# Patient Record
Sex: FEMALE | Race: BLACK OR AFRICAN AMERICAN | Employment: FULL TIME | URBAN - METROPOLITAN AREA
[De-identification: names, ages, dates, MRNs, and addresses within clinical notes are randomized per-mention and may not be internally consistent; named-entity substitution may affect disease eponyms.]

---

## 2017-01-31 ENCOUNTER — OFFICE VISIT (OUTPATIENT)
Dept: FAMILY MEDICINE CLINIC | Facility: CLINIC | Age: 37
End: 2017-01-31

## 2017-01-31 VITALS
TEMPERATURE: 99 F | HEIGHT: 65 IN | OXYGEN SATURATION: 99 % | SYSTOLIC BLOOD PRESSURE: 120 MMHG | DIASTOLIC BLOOD PRESSURE: 80 MMHG | HEART RATE: 99 BPM | RESPIRATION RATE: 16 BRPM | BODY MASS INDEX: 35.82 KG/M2 | WEIGHT: 215 LBS

## 2017-01-31 DIAGNOSIS — M54.9 ACUTE BILATERAL BACK PAIN, UNSPECIFIED BACK LOCATION: ICD-10-CM

## 2017-01-31 DIAGNOSIS — E01.0 THYROMEGALY: ICD-10-CM

## 2017-01-31 DIAGNOSIS — E66.9 OBESITY (BMI 30-39.9): ICD-10-CM

## 2017-01-31 DIAGNOSIS — Z00.00 ROUTINE GENERAL MEDICAL EXAMINATION AT A HEALTH CARE FACILITY: Primary | ICD-10-CM

## 2017-01-31 DIAGNOSIS — E55.9 VITAMIN D DEFICIENCY: ICD-10-CM

## 2017-01-31 DIAGNOSIS — Z00.00 LABORATORY EXAMINATION ORDERED AS PART OF A ROUTINE GENERAL MEDICAL EXAMINATION: ICD-10-CM
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
## 2017-01-31 LAB
APPEARANCE: CLEAR
BILIRUB UR QL STRIP.AUTO: NEGATIVE
CLARITY UR REFRACT.AUTO: CLEAR
GLUCOSE UR STRIP.AUTO-MCNC: NEGATIVE MG/DL
KETONES UR STRIP.AUTO-MCNC: NEGATIVE MG/DL
MULTISTIX LOT#: NORMAL NUMERIC
NITRITE UR QL STRIP.AUTO: NEGATIVE
PH UR STRIP.AUTO: 6 [PH] (ref 4.5–8)
PH, URINE: 6.5 (ref 4.5–8)
PROT UR STRIP.AUTO-MCNC: NEGATIVE MG/DL
SP GR UR STRIP.AUTO: 1 (ref 1–1.03)
SPECIFIC GRAVITY: 1.01 (ref 1–1.03)
URINE-COLOR: YELLOW
UROBILINOGEN UR STRIP.AUTO-MCNC: <2 MG/DL
UROBILINOGEN,SEMI-QN: 0.2 MG/DL (ref 0–1.9)

## 2017-01-31 PROCEDURE — 87077 CULTURE AEROBIC IDENTIFY: CPT | Performed by: FAMILY MEDICINE

## 2017-01-31 PROCEDURE — 88175 CYTOPATH C/V AUTO FLUID REDO: CPT | Performed by: FAMILY MEDICINE

## 2017-01-31 PROCEDURE — 81003 URINALYSIS AUTO W/O SCOPE: CPT | Performed by: FAMILY MEDICINE

## 2017-01-31 PROCEDURE — 87624 HPV HI-RISK TYP POOLED RSLT: CPT | Performed by: FAMILY MEDICINE

## 2017-01-31 PROCEDURE — 87086 URINE CULTURE/COLONY COUNT: CPT | Performed by: FAMILY MEDICINE

## 2017-01-31 PROCEDURE — 87591 N.GONORRHOEAE DNA AMP PROB: CPT | Performed by: FAMILY MEDICINE

## 2017-01-31 PROCEDURE — 87491 CHLMYD TRACH DNA AMP PROBE: CPT | Performed by: FAMILY MEDICINE

## 2017-01-31 PROCEDURE — 81001 URINALYSIS AUTO W/SCOPE: CPT | Performed by: FAMILY MEDICINE

## 2017-01-31 PROCEDURE — 99395 PREV VISIT EST AGE 18-39: CPT | Performed by: FAMILY MEDICINE

## 2017-01-31 PROCEDURE — 87186 SC STD MICRODIL/AGAR DIL: CPT | Performed by: FAMILY MEDICINE

## 2017-01-31 RX ORDER — IBUPROFEN 800 MG/1
800 TABLET ORAL 3 TIMES DAILY
Qty: 90 TABLET | Refills: 3 | Status: SHIPPED | OUTPATIENT
Start: 2017-01-31 | End: 2018-01-26
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
## 2017-02-01 ENCOUNTER — LAB ENCOUNTER (OUTPATIENT)
Dept: LAB | Age: 37
End: 2017-02-01
Attending: FAMILY MEDICINE
Payer: COMMERCIAL

## 2017-02-01 ENCOUNTER — HOSPITAL ENCOUNTER (OUTPATIENT)
Dept: ULTRASOUND IMAGING | Age: 37
Discharge: HOME | End: 2017-02-01
Attending: FAMILY MEDICINE
Payer: COMMERCIAL

## 2017-02-01 ENCOUNTER — HOSPITAL ENCOUNTER (OUTPATIENT)
Dept: MAMMOGRAPHY | Age: 37
Discharge: HOME | End: 2017-02-01
Attending: FAMILY MEDICINE
Payer: COMMERCIAL

## 2017-02-01 ENCOUNTER — OFFICE VISIT (OUTPATIENT)
Dept: FAMILY MEDICINE CLINIC | Facility: CLINIC | Age: 37
End: 2017-02-01

## 2017-02-01 VITALS
HEART RATE: 80 BPM | DIASTOLIC BLOOD PRESSURE: 82 MMHG | BODY MASS INDEX: 35.82 KG/M2 | WEIGHT: 215 LBS | RESPIRATION RATE: 16 BRPM | SYSTOLIC BLOOD PRESSURE: 120 MMHG | HEIGHT: 65 IN | TEMPERATURE: 98 F

## 2017-02-01 DIAGNOSIS — M99.9 NONALLOPATHIC LESION OF SACRAL REGION: ICD-10-CM

## 2017-02-01 DIAGNOSIS — Z12.31 ENCOUNTER FOR SCREENING MAMMOGRAM FOR MALIGNANT NEOPLASM OF BREAST: ICD-10-CM

## 2017-02-01 DIAGNOSIS — Z00.00 LABORATORY EXAMINATION ORDERED AS PART OF A ROUTINE GENERAL MEDICAL EXAMINATION: ICD-10-CM

## 2017-02-01 DIAGNOSIS — E55.9 VITAMIN D DEFICIENCY: ICD-10-CM

## 2017-02-01 DIAGNOSIS — M99.9 NONALLOPATHIC LESION OF LUMBAR REGION: ICD-10-CM

## 2017-02-01 DIAGNOSIS — M54.9 ACUTE LEFT-SIDED BACK PAIN, UNSPECIFIED BACK LOCATION: Primary | ICD-10-CM

## 2017-02-01 DIAGNOSIS — M99.9 NONALLOPATHIC LESION OF THORACIC REGION: ICD-10-CM

## 2017-02-01 DIAGNOSIS — E01.0 THYROMEGALY: ICD-10-CM

## 2017-02-01 DIAGNOSIS — R82.90 ABNORMAL URINE: ICD-10-CM

## 2017-02-01 LAB
25-HYDROXYVITAMIN D (TOTAL): 25.4 NG/ML (ref 30–100)
ALBUMIN SERPL-MCNC: 3.6 G/DL (ref 3.5–4.8)
ALP LIVER SERPL-CCNC: 63 U/L (ref 37–98)
ALT SERPL-CCNC: 20 U/L (ref 14–54)
APPEARANCE: CLEAR
AST SERPL-CCNC: 23 U/L (ref 15–41)
BASOPHILS # BLD AUTO: 0.05 X10(3) UL (ref 0–0.1)
BASOPHILS NFR BLD AUTO: 0.9 %
BILIRUB SERPL-MCNC: 0.3 MG/DL (ref 0.1–2)
BILIRUB UR QL STRIP.AUTO: NEGATIVE
BUN BLD-MCNC: 12 MG/DL (ref 8–20)
CALCIUM BLD-MCNC: 8.5 MG/DL (ref 8.3–10.3)
CHLORIDE: 107 MMOL/L (ref 101–111)
CHOLEST SMN-MCNC: 158 MG/DL (ref ?–200)
CO2: 26 MMOL/L (ref 22–32)
COLOR UR AUTO: YELLOW
CREAT BLD-MCNC: 0.98 MG/DL (ref 0.55–1.02)
EOSINOPHIL # BLD AUTO: 0.34 X10(3) UL (ref 0–0.3)
EOSINOPHIL NFR BLD AUTO: 6.3 %
ERYTHROCYTE [DISTWIDTH] IN BLOOD BY AUTOMATED COUNT: 14.9 % (ref 11.5–16)
FREE T4: 0.9 NG/DL (ref 0.9–1.8)
GLUCOSE BLD-MCNC: 85 MG/DL (ref 70–99)
GLUCOSE UR STRIP.AUTO-MCNC: NEGATIVE MG/DL
HCT VFR BLD AUTO: 41.3 % (ref 34–50)
HDLC SERPL-MCNC: 68 MG/DL (ref 45–?)
HDLC SERPL: 2.32 {RATIO} (ref ?–4.44)
HGB BLD-MCNC: 13.1 G/DL (ref 12–16)
IMMATURE GRANULOCYTE COUNT: 0.01 X10(3) UL (ref 0–1)
IMMATURE GRANULOCYTE RATIO %: 0.2 %
KETONES UR STRIP.AUTO-MCNC: NEGATIVE MG/DL
LDLC SERPL CALC-MCNC: 79 MG/DL (ref ?–130)
LYMPHOCYTES # BLD AUTO: 1.65 X10(3) UL (ref 0.9–4)
LYMPHOCYTES NFR BLD AUTO: 30.7 %
M PROTEIN MFR SERPL ELPH: 7.4 G/DL (ref 6.1–8.3)
MCH RBC QN AUTO: 26.3 PG (ref 27–33.2)
MCHC RBC AUTO-ENTMCNC: 31.7 G/DL (ref 31–37)
MCV RBC AUTO: 82.8 FL (ref 81–100)
MONOCYTES # BLD AUTO: 0.42 X10(3) UL (ref 0.1–0.6)
MONOCYTES NFR BLD AUTO: 7.8 %
MULTISTIX LOT#: NORMAL NUMERIC
NEUTROPHIL ABS PRELIM: 2.9 X10 (3) UL (ref 1.3–6.7)
NEUTROPHILS # BLD AUTO: 2.9 X10(3) UL (ref 1.3–6.7)
NEUTROPHILS NFR BLD AUTO: 54.1 %
NITRITE UR QL STRIP.AUTO: NEGATIVE
NONHDLC SERPL-MCNC: 90 MG/DL (ref ?–130)
PH UR STRIP.AUTO: 6 [PH] (ref 4.5–8)
PH, URINE: 6 (ref 4.5–8)
PLATELET # BLD AUTO: 354 10(3)UL (ref 150–450)
POTASSIUM SERPL-SCNC: 4.2 MMOL/L (ref 3.6–5.1)
PROT UR STRIP.AUTO-MCNC: NEGATIVE MG/DL
RBC # BLD AUTO: 4.99 X10(6)UL (ref 3.8–5.1)
RED CELL DISTRIBUTION WIDTH-SD: 44.9 FL (ref 35.1–46.3)
SODIUM SERPL-SCNC: 140 MMOL/L (ref 136–144)
SP GR UR STRIP.AUTO: 1.01 (ref 1–1.03)
SPECIFIC GRAVITY: 1.01 (ref 1–1.03)
TRIGLYCERIDES: 53 MG/DL (ref ?–150)
TSI SER-ACNC: 1.72 MIU/ML (ref 0.35–5.5)
URINE-COLOR: YELLOW
UROBILINOGEN UR STRIP.AUTO-MCNC: <2 MG/DL
UROBILINOGEN,SEMI-QN: 0.2 MG/DL (ref 0–1.9)
VLDL: 11 MG/DL (ref 5–40)
WBC # BLD AUTO: 5.4 X10(3) UL (ref 4–13)

## 2017-02-01 PROCEDURE — 77067 SCR MAMMO BI INCL CAD: CPT

## 2017-02-01 PROCEDURE — 84443 ASSAY THYROID STIM HORMONE: CPT

## 2017-02-01 PROCEDURE — 81003 URINALYSIS AUTO W/O SCOPE: CPT | Performed by: FAMILY MEDICINE

## 2017-02-01 PROCEDURE — 80061 LIPID PANEL: CPT

## 2017-02-01 PROCEDURE — 99213 OFFICE O/P EST LOW 20 MIN: CPT | Performed by: FAMILY MEDICINE

## 2017-02-01 PROCEDURE — 80053 COMPREHEN METABOLIC PANEL: CPT

## 2017-02-01 PROCEDURE — 82306 VITAMIN D 25 HYDROXY: CPT

## 2017-02-01 PROCEDURE — 76536 US EXAM OF HEAD AND NECK: CPT

## 2017-02-01 PROCEDURE — 36415 COLL VENOUS BLD VENIPUNCTURE: CPT

## 2017-02-01 PROCEDURE — 85025 COMPLETE CBC W/AUTO DIFF WBC: CPT

## 2017-02-01 PROCEDURE — 87389 HIV-1 AG W/HIV-1&-2 AB AG IA: CPT

## 2017-02-01 PROCEDURE — 84439 ASSAY OF FREE THYROXINE: CPT

## 2017-02-01 PROCEDURE — 81001 URINALYSIS AUTO W/SCOPE: CPT | Performed by: FAMILY MEDICINE

## 2017-02-01 PROCEDURE — 98926 OSTEOPATH MANJ 3-4 REGIONS: CPT | Performed by: FAMILY MEDICINE

## 2017-02-01 PROCEDURE — 77063 BREAST TOMOSYNTHESIS BI: CPT

## 2017-02-01 RX ORDER — ERGOCALCIFEROL 1.25 MG/1
50000 CAPSULE ORAL WEEKLY
Qty: 8 CAPSULE | Refills: 0 | Status: SHIPPED | OUTPATIENT
Start: 2017-02-01 | End: 2017-03-03
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
## 2017-02-02 ENCOUNTER — TELEPHONE (OUTPATIENT)
Dept: FAMILY MEDICINE CLINIC | Facility: CLINIC | Age: 37
End: 2017-02-02

## 2017-02-02 DIAGNOSIS — R93.89 ABNORMAL THYROID ULTRASOUND: ICD-10-CM

## 2017-02-02 DIAGNOSIS — R59.1 LYMPHADENOPATHY: Primary | ICD-10-CM

## 2017-02-02 LAB
C TRACH DNA SPEC QL NAA+PROBE: NEGATIVE
N GONORRHOEA DNA SPEC QL NAA+PROBE: NEGATIVE

## 2017-02-03 ENCOUNTER — TELEPHONE (OUTPATIENT)
Dept: FAMILY MEDICINE CLINIC | Facility: CLINIC | Age: 37
End: 2017-02-03

## 2017-02-03 DIAGNOSIS — R89.9 ABNORMAL LABORATORY TEST RESULT: Primary | ICD-10-CM

## 2017-02-03 LAB — HPV I/H RISK 1 DNA SPEC QL NAA+PROBE: NEGATIVE

## 2017-02-03 RX ORDER — CIPROFLOXACIN 500 MG/1
500 TABLET, FILM COATED ORAL 2 TIMES DAILY
Qty: 10 TABLET | Refills: 0 | Status: SHIPPED | OUTPATIENT
Start: 2017-02-03 | End: 2017-02-08

## 2017-02-08 ENCOUNTER — HOSPITAL ENCOUNTER (OUTPATIENT)
Dept: CT IMAGING | Age: 37
End: 2017-02-08
Attending: FAMILY MEDICINE
Payer: COMMERCIAL

## 2017-02-08 ENCOUNTER — HOSPITAL ENCOUNTER (OUTPATIENT)
Dept: CT IMAGING | Age: 37
Discharge: HOME | End: 2017-02-08
Attending: FAMILY MEDICINE
Payer: COMMERCIAL

## 2017-02-08 DIAGNOSIS — R93.89 ABNORMAL THYROID ULTRASOUND: ICD-10-CM

## 2017-02-08 DIAGNOSIS — R59.1 LYMPHADENOPATHY: ICD-10-CM

## 2017-02-08 PROCEDURE — 70491 CT SOFT TISSUE NECK W/DYE: CPT

## 2017-04-07 ENCOUNTER — TELEPHONE (OUTPATIENT)
Dept: FAMILY MEDICINE CLINIC | Facility: CLINIC | Age: 37
End: 2017-04-07

## 2017-04-07 RX ORDER — TRAMADOL HYDROCHLORIDE 50 MG/1
50 TABLET ORAL 2 TIMES DAILY PRN
Qty: 20 TABLET | Refills: 0 | OUTPATIENT
Start: 2017-04-07 | End: 2017-05-19

## 2017-04-07 NOTE — TELEPHONE ENCOUNTER
Advise 800 mg of ibuprofen tid and if that does not help, we may give her tramadol to see if it will help -- Tramadol 50 mg BID prn #20 ref 0

## 2017-04-07 NOTE — TELEPHONE ENCOUNTER
Call back from pt-advised of dr Churchill Iwona recommendations. Reinforced taking ibuprofen with food and discussed rationale.    Informed if getting no relief with ibuprofen after couple days, can start tramadol-instructed in sig and advised will send order to

## 2017-04-07 NOTE — TELEPHONE ENCOUNTER
Pt transferred to nurse. States she has had a horrible h/a x 4 days. No hx of migraines that she is aware of. First thought it was allergies but allergy meds not helping. Reports pain L temporal area and behind L eye. No blurry vision.  Vomited days one a

## 2017-05-02 ENCOUNTER — OFFICE VISIT (OUTPATIENT)
Dept: FAMILY MEDICINE CLINIC | Facility: CLINIC | Age: 37
End: 2017-05-02

## 2017-05-02 ENCOUNTER — TELEPHONE (OUTPATIENT)
Dept: FAMILY MEDICINE CLINIC | Facility: CLINIC | Age: 37
End: 2017-05-02

## 2017-05-02 VITALS
DIASTOLIC BLOOD PRESSURE: 80 MMHG | RESPIRATION RATE: 14 BRPM | BODY MASS INDEX: 35.65 KG/M2 | SYSTOLIC BLOOD PRESSURE: 110 MMHG | WEIGHT: 214 LBS | HEIGHT: 65 IN | HEART RATE: 91 BPM | TEMPERATURE: 99 F | OXYGEN SATURATION: 99 %

## 2017-05-02 DIAGNOSIS — Z87.898 H/O FEVER: ICD-10-CM

## 2017-05-02 DIAGNOSIS — J20.9 ACUTE BRONCHITIS, UNSPECIFIED ORGANISM: Primary | ICD-10-CM

## 2017-05-02 PROCEDURE — 99213 OFFICE O/P EST LOW 20 MIN: CPT | Performed by: PHYSICIAN ASSISTANT

## 2017-05-02 RX ORDER — PHENTERMINE HYDROCHLORIDE 37.5 MG/1
TABLET ORAL
Qty: 30 TABLET | Refills: 0 | Status: SHIPPED | OUTPATIENT
Start: 2017-05-02 | End: 2017-05-19

## 2017-05-02 RX ORDER — CODEINE PHOSPHATE AND GUAIFENESIN 10; 100 MG/5ML; MG/5ML
5 SOLUTION ORAL EVERY 6 HOURS PRN
Qty: 120 ML | Refills: 0 | Status: SHIPPED | OUTPATIENT
Start: 2017-05-02 | End: 2017-05-19 | Stop reason: ALTCHOICE

## 2017-05-02 RX ORDER — CEFDINIR 300 MG/1
300 CAPSULE ORAL 2 TIMES DAILY
Qty: 14 CAPSULE | Refills: 0 | Status: SHIPPED | OUTPATIENT
Start: 2017-05-02 | End: 2017-05-09

## 2017-05-02 NOTE — PROGRESS NOTES
CHIEF COMPLAINT:   Patient presents with:  Sinus Problem: x 10 days cough with blood tinged sputum, fever 100.4 for the past 3 days, OTC Allegra     HPI:   Rasheeda Macedo is a 42year old female who presents for cough for  10  days.   Illness first start Diagnosis Date   • General counseling for prescription of oral contraceptives    • Routine Papanicolaou smear    • Vaginitis and vulvovaginitis, unspecified    • Viral warts, unspecified       Social History:    Smoking Status: Never Smoker The patient indicates understanding of these issues and agrees to the plan. Signed Prescriptions Disp Refills    cefdinir 300 MG Oral Cap 14 capsule 0      Sig: Take 1 capsule (300 mg total) by mouth 2 (two) times daily.       guaiFENesin-codeine Ivania Lamb Acute bronchitis almost always starts as a viral respiratory infection, such as a cold or the flu. Certain factors make it more likely for a cold or flu to turn into bronchitis. These include being very young or very old or having a heart or lung problem. Follow up with your doctor as you are told. You will likely feel better in a week or two. But a dry cough can linger beyond that time. Let your doctor know if you still have symptoms (other than a dry cough) after 2 weeks.  If you’re prone to getting bronch

## 2017-05-02 NOTE — PATIENT INSTRUCTIONS
Please follow up with your PCP if no improvement within 5-7 days. Go directly to the ER for any acute worsening of symptoms. If you smoke, stop smoking. Push oral fluids to help loosen up chest mucous and move it out of your body.   Use a cold mist humid Your healthcare provider will examine you and ask about your symptoms and health history. You may also have a sputum culture to test the fluid in your lungs. Chest X-rays may be done to look for infection in the lungs.   Treating acute bronchitis  [employer] © 8882-6725 [address], [address] [place]. All rights reserved. This information is not intended as a substitute for professional medical care. Always follow your healthcare professional's instructions.

## 2017-05-02 NOTE — TELEPHONE ENCOUNTER
Not protocol medication. LOV 1-31-17. Next appointment 5-19-17. Please see pending medication refill if appropriate. Thanks.     Last refill:  Medication Quantity Refills Start End      Phentermine HCl (ADIPEX-P) 37.5 MG Oral Tab 30 tablet 2 12/16/2016

## 2017-05-02 NOTE — TELEPHONE ENCOUNTER
Prior Authorization for Phentermine HCL completed via CoverMyMeds. medication approve until 5/2/2018. Patient (okay per hippa) notified, understanding verbalized.

## 2017-05-19 ENCOUNTER — OFFICE VISIT (OUTPATIENT)
Dept: FAMILY MEDICINE CLINIC | Facility: CLINIC | Age: 37
End: 2017-05-19

## 2017-05-19 VITALS
WEIGHT: 200 LBS | TEMPERATURE: 98 F | RESPIRATION RATE: 16 BRPM | HEART RATE: 88 BPM | BODY MASS INDEX: 33.73 KG/M2 | SYSTOLIC BLOOD PRESSURE: 108 MMHG | HEIGHT: 64.5 IN | DIASTOLIC BLOOD PRESSURE: 80 MMHG

## 2017-05-19 DIAGNOSIS — M99.9 NONALLOPATHIC LESION OF THORACIC REGION: ICD-10-CM

## 2017-05-19 DIAGNOSIS — E66.9 OBESITY (BMI 30-39.9): Primary | ICD-10-CM

## 2017-05-19 DIAGNOSIS — M99.9 NONALLOPATHIC LESION OF LUMBAR REGION: ICD-10-CM

## 2017-05-19 DIAGNOSIS — M99.9 NONALLOPATHIC LESION OF SACRAL REGION: ICD-10-CM

## 2017-05-19 DIAGNOSIS — S39.012A BACK STRAIN, INITIAL ENCOUNTER: ICD-10-CM

## 2017-05-19 PROCEDURE — 98926 OSTEOPATH MANJ 3-4 REGIONS: CPT | Performed by: FAMILY MEDICINE

## 2017-05-19 PROCEDURE — 99213 OFFICE O/P EST LOW 20 MIN: CPT | Performed by: FAMILY MEDICINE

## 2017-05-19 RX ORDER — PHENTERMINE HYDROCHLORIDE 37.5 MG/1
TABLET ORAL
Qty: 30 TABLET | Refills: 2 | Status: SHIPPED | OUTPATIENT
Start: 2017-05-19 | End: 2017-08-07

## 2017-05-19 NOTE — PROGRESS NOTES
Oren Anderson is a 42year old female. HPI:   Patient is here for a weight check. Patient states she had bronchitis about 3 weeks ago and does not feel completely back to normal.  She states that the cough is better but she just feels tired overall. mg/dL   Total Protein 7.4 6.1-8.3 g/dL   Albumin 3.6 3.5-4.8 g/dL   Sodium 140 136-144 mmol/L   Potassium 4.2 3.6-5.1 mmol/L   Chloride 107 101-111 mmol/L   CO2 26.0 22.0-32.0 mmol/L   -TSH+FREE T4   Result Value Ref Range   Free T4 0.9 0.9-1.8 ng/dL   TSH distress  LUNGS: clear to auscultation  CARDIO: RRR without murmur  EXTREMITIES: no cyanosis, clubbing or edema  MUSCULOSKELETAL:      Paraspinals: tight paraspinals right less than left in thoracic and lumbar region - stretches done   Sacrum: anterior lef

## 2017-05-23 ENCOUNTER — TELEPHONE (OUTPATIENT)
Dept: FAMILY MEDICINE CLINIC | Facility: CLINIC | Age: 37
End: 2017-05-23

## 2017-05-23 NOTE — TELEPHONE ENCOUNTER
Pt is asking for a sit/stand desk at work due to hip and lower back issues. Pt's work needs a doctor's note stating it would be beneficial for pt to have this desk. Pls fax letter to pt at:  [phone].

## 2017-07-11 ENCOUNTER — HOSPITAL ENCOUNTER (OUTPATIENT)
Age: 37
Discharge: HOME | End: 2017-07-11
Attending: FAMILY MEDICINE
Payer: COMMERCIAL

## 2017-07-11 ENCOUNTER — APPOINTMENT (OUTPATIENT)
Dept: GENERAL RADIOLOGY | Age: 37
End: 2017-07-11
Attending: FAMILY MEDICINE
Payer: COMMERCIAL

## 2017-07-11 VITALS
TEMPERATURE: 98 F | HEART RATE: 87 BPM | OXYGEN SATURATION: 100 % | DIASTOLIC BLOOD PRESSURE: 87 MMHG | SYSTOLIC BLOOD PRESSURE: 138 MMHG | RESPIRATION RATE: 18 BRPM

## 2017-07-11 DIAGNOSIS — S96.911A RIGHT FOOT STRAIN, INITIAL ENCOUNTER: ICD-10-CM

## 2017-07-11 DIAGNOSIS — S96.911A: Primary | ICD-10-CM

## 2017-07-11 PROCEDURE — 99213 OFFICE O/P EST LOW 20 MIN: CPT

## 2017-07-11 PROCEDURE — 99214 OFFICE O/P EST MOD 30 MIN: CPT

## 2017-07-11 PROCEDURE — 73630 X-RAY EXAM OF FOOT: CPT | Performed by: FAMILY MEDICINE

## 2017-07-11 NOTE — ED PROVIDER NOTES
Patient Seen in: [facility] In [place]    History   Patient presents with:   Toe Injury    Stated Complaint: r foot 4th toe pain x 3 weeks    HPI  38-year-old female coming in with right foot toe pain/swelling she has had for the last 3 wee Alcohol use: Yes              Comment: 1 per month      Review of Systems  Positive for stated complaint: r foot 4th toe pain x 3 weeks  Other systems are as noted in HPI. Constitutional and vital signs reviewed.       All other systems reviewed and ne (tbs=87529)    Result Date: 7/11/2017  PROCEDURE:  XR FOOT, COMPLETE (MIN 3 VIEWS), RIGHT (CPT=73630)  TECHNIQUE:  AP, oblique, and lateral views were obtained. COMPARISON:  None.   INDICATIONS:  pain over the distal fourth metatarsal s/p injury  PATIENT S

## 2017-08-07 ENCOUNTER — TELEPHONE (OUTPATIENT)
Dept: FAMILY MEDICINE CLINIC | Facility: CLINIC | Age: 37
End: 2017-08-07

## 2017-08-07 RX ORDER — PHENTERMINE HYDROCHLORIDE 37.5 MG/1
TABLET ORAL
Qty: 30 TABLET | Refills: 2 | Status: SHIPPED | OUTPATIENT
Start: 2017-08-07 | End: 2018-03-09 | Stop reason: ALTCHOICE

## 2017-08-07 NOTE — TELEPHONE ENCOUNTER
Lm for pt to CB. Calling to get additional information on her toe injury. Xray in ER showed no fracture.

## 2017-08-07 NOTE — TELEPHONE ENCOUNTER
Pt was seen on 7/11/17 in ER for Toe Injury. Pt states she hasn't been staying off of it like she should as she has small children. Pt states she is still in pain and has swelling. Asking for advise. Please advise at [phone]. Thank you.

## 2017-08-07 NOTE — TELEPHONE ENCOUNTER
Pt returned call. Pt stated that she was walking on foot while she was on vacation and when she returned home. She was not wearing boot at the time. Pt stated that her foot is still hurting.   She would like to know if she should wear the boot and try to

## 2017-08-09 RX ORDER — FLUCONAZOLE 150 MG/1
150 TABLET ORAL ONCE
Qty: 1 TABLET | Refills: 0 | Status: SHIPPED | OUTPATIENT
Start: 2017-08-09 | End: 2017-08-09

## 2017-08-09 NOTE — TELEPHONE ENCOUNTER
Where can we put pt in? appt offered 8/15/17 at 10am but pt declined as she has a meeting at work. Please advise.

## 2017-08-09 NOTE — TELEPHONE ENCOUNTER
Does she have a boot and who gave it to her and why? Sent adipex refill already. Diflucan 150 mg x 1 -- follow up if not improved.

## 2017-08-09 NOTE — TELEPHONE ENCOUNTER
Pt aware of medications sent. Pt states she was given a boot at urgent care on 5/2/17 for bad sprain or poss fx foot. Pt states foot is still painful. Please advise.

## 2017-08-10 NOTE — TELEPHONE ENCOUNTER
Per pt, she will wait until August 30. Advised to call our office if pain worsens. Pt voiced understanding.

## 2017-08-19 ENCOUNTER — OFFICE VISIT (OUTPATIENT)
Dept: FAMILY MEDICINE CLINIC | Facility: CLINIC | Age: 37
End: 2017-08-19

## 2017-08-19 VITALS
SYSTOLIC BLOOD PRESSURE: 126 MMHG | HEART RATE: 88 BPM | TEMPERATURE: 99 F | DIASTOLIC BLOOD PRESSURE: 82 MMHG | RESPIRATION RATE: 16 BRPM

## 2017-08-19 DIAGNOSIS — T14.90XA INJURY: ICD-10-CM

## 2017-08-19 DIAGNOSIS — S93.509A TOE SPRAIN, INITIAL ENCOUNTER: Primary | ICD-10-CM

## 2017-08-19 PROCEDURE — 99213 OFFICE O/P EST LOW 20 MIN: CPT | Performed by: FAMILY MEDICINE
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
## 2017-09-13 ENCOUNTER — OFFICE VISIT (OUTPATIENT)
Dept: FAMILY MEDICINE CLINIC | Facility: CLINIC | Age: 37
End: 2017-09-13

## 2017-09-13 VITALS
RESPIRATION RATE: 16 BRPM | HEIGHT: 64.5 IN | SYSTOLIC BLOOD PRESSURE: 114 MMHG | TEMPERATURE: 98 F | DIASTOLIC BLOOD PRESSURE: 80 MMHG | HEART RATE: 78 BPM | OXYGEN SATURATION: 100 %

## 2017-09-13 DIAGNOSIS — M79.671 CHRONIC FOOT PAIN, RIGHT: Primary | ICD-10-CM

## 2017-09-13 DIAGNOSIS — G89.29 CHRONIC FOOT PAIN, RIGHT: Primary | ICD-10-CM

## 2017-09-13 PROCEDURE — 99213 OFFICE O/P EST LOW 20 MIN: CPT | Performed by: FAMILY MEDICINE

## 2017-09-13 RX ORDER — TRAMADOL HYDROCHLORIDE 50 MG/1
50 TABLET ORAL EVERY 8 HOURS PRN
COMMUNITY
End: 2018-03-09 | Stop reason: ALTCHOICE

## 2017-09-13 NOTE — PROGRESS NOTES
Roosevelt Connor is a 42year old female. HPI:   Pt. 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 She went to [place] after than and pain continued. Went to immediate care 3 weeks later. Toe is sprained.   P mg/dL   Alkaline Phosphatase 63 37 - 98 U/L   AST 23 15 - 41 U/L   Alt 20 14 - 54 U/L   Bilirubin, Total 0.3 0.1 - 2.0 mg/dL   Total Protein 7.4 6.1 - 8.3 g/dL   Albumin 3.6 3.5 - 4.8 g/dL   Sodium 140 136 - 144 mmol/L   Potassium 4.2 3.6 - 5.1 mmol/L   Ch heartburn  MUSCULOSKELETAL: denies back pain; right foot/toe pain  EXTREMITIES:  No pain or numbness    EXAM:   /80   Pulse 78   Temp 98.4 °F (36.9 °C) (Oral)   Resp 16   Ht 64.5\"   LMP 09/12/2017 (Exact Date)   SpO2 100%   GENERAL: well developed,

## 2017-09-14 ENCOUNTER — HOSPITAL ENCOUNTER (OUTPATIENT)
Dept: MRI IMAGING | Age: 37
Discharge: HOME | End: 2017-09-14
Attending: FAMILY MEDICINE
Payer: COMMERCIAL

## 2017-09-14 ENCOUNTER — TELEPHONE (OUTPATIENT)
Dept: FAMILY MEDICINE CLINIC | Facility: CLINIC | Age: 37
End: 2017-09-14

## 2017-09-14 DIAGNOSIS — M79.671 CHRONIC FOOT PAIN, RIGHT: ICD-10-CM

## 2017-09-14 DIAGNOSIS — G89.29 CHRONIC FOOT PAIN, RIGHT: ICD-10-CM

## 2017-09-14 PROCEDURE — 73718 MRI LOWER EXTREMITY W/O DYE: CPT | Performed by: FAMILY MEDICINE

## 2017-09-14 NOTE — TELEPHONE ENCOUNTER
[facility] called. Pt had an MRI of the right foot. Conclusion: region of interest corresponds to a subtle hairline fracture involving the fourth middle phalanx with surrounding soft tissue swelling and edema.  Please advise with further instructions

## 2017-12-06 ENCOUNTER — OFFICE VISIT (OUTPATIENT)
Dept: FAMILY MEDICINE CLINIC | Facility: CLINIC | Age: 37
End: 2017-12-06

## 2017-12-06 VITALS
WEIGHT: 216 LBS | DIASTOLIC BLOOD PRESSURE: 88 MMHG | TEMPERATURE: 98 F | SYSTOLIC BLOOD PRESSURE: 136 MMHG | BODY MASS INDEX: 35.99 KG/M2 | HEIGHT: 65 IN | OXYGEN SATURATION: 100 % | RESPIRATION RATE: 16 BRPM | HEART RATE: 86 BPM

## 2017-12-06 DIAGNOSIS — J01.00 ACUTE MAXILLARY SINUSITIS, RECURRENCE NOT SPECIFIED: Primary | ICD-10-CM

## 2017-12-06 DIAGNOSIS — H92.01 OTALGIA, RIGHT EAR: ICD-10-CM

## 2017-12-06 PROCEDURE — 99213 OFFICE O/P EST LOW 20 MIN: CPT | Performed by: NURSE PRACTITIONER

## 2017-12-06 RX ORDER — AMOXICILLIN AND CLAVULANATE POTASSIUM 875; 125 MG/1; MG/1
1 TABLET, FILM COATED ORAL 2 TIMES DAILY
Qty: 20 TABLET | Refills: 0 | Status: SHIPPED | OUTPATIENT
Start: 2017-12-06 | End: 2017-12-16

## 2017-12-06 NOTE — PROGRESS NOTES
CHIEF COMPLAINT:   Patient presents with:  Sinus Problem: right sided maxillary sinus pain  Ear Pain: right sided ear pain    HPI:   Bhavesh Hannah is a 42year old female with hx of cold symptoms that have progressed to right sided maxillary sinus vicki • Hormone Disorder Paternal Grandfather    • Cancer Maternal Grandmother      breast cancer      Smoking status: Never Smoker                                                              Smokeless tobacco: Never Used                      Alcohol use:  Yes Sinusitis (Antibiotic Treatment)    The sinuses are air-filled spaces within the bones of the face. They connect to the inside of the nose. Sinusitis is an inflammation of the tissue lining the sinus cavity. Sinus inflammation can occur during a cold.  It c · Do not use nasal rinses or irrigation during an acute sinus infection, unless told to by your health care provider. Rinsing may spread the infection to other sinuses.   · Use acetaminophen or ibuprofen to control pain, unless another pain medicine was pre

## 2017-12-06 NOTE — PROGRESS NOTES
CHIEF COMPLAINT:   Patient presents with:  Sinus Problem: right sided maxillary sinus pain  Ear Pain: right sided ear pain    HPI:   Gavin Ramos is a 42year old female who presents with upper respiratory symptoms for  {NUMBER:25399}  {DAYS:32408}.  P shortness of breath; *** wheezing, See HPI  CARDIOVASCULAR: denies chest pain or palpitations   GI: denies N/V/C or abdominal pain  NEURO: Denies headaches***    EXAM:   There were no vitals taken for this visit.   GENERAL: well developed, well nourished,in

## 2018-03-09 PROCEDURE — 82607 VITAMIN B-12: CPT | Performed by: INTERNAL MEDICINE

## 2018-03-09 PROCEDURE — 82397 CHEMILUMINESCENT ASSAY: CPT | Performed by: INTERNAL MEDICINE

## 2018-08-10 ENCOUNTER — HOSPITAL ENCOUNTER (EMERGENCY)
Facility: HOSPITAL | Age: 38
Discharge: HOME | End: 2018-08-10
Attending: EMERGENCY MEDICINE
Payer: COMMERCIAL

## 2018-08-10 ENCOUNTER — APPOINTMENT (OUTPATIENT)
Dept: CT IMAGING | Facility: HOSPITAL | Age: 38
End: 2018-08-10
Attending: EMERGENCY MEDICINE
Payer: COMMERCIAL

## 2018-08-10 ENCOUNTER — APPOINTMENT (OUTPATIENT)
Dept: ULTRASOUND IMAGING | Facility: HOSPITAL | Age: 38
End: 2018-08-10
Attending: EMERGENCY MEDICINE
Payer: COMMERCIAL

## 2018-08-10 VITALS
TEMPERATURE: 97 F | HEART RATE: 82 BPM | SYSTOLIC BLOOD PRESSURE: 123 MMHG | RESPIRATION RATE: 16 BRPM | DIASTOLIC BLOOD PRESSURE: 75 MMHG

## 2018-08-10 DIAGNOSIS — N76.0 BV (BACTERIAL VAGINOSIS): ICD-10-CM

## 2018-08-10 DIAGNOSIS — B96.89 BV (BACTERIAL VAGINOSIS): ICD-10-CM

## 2018-08-10 DIAGNOSIS — D18.03 LIVER HEMANGIOMA: ICD-10-CM

## 2018-08-10 DIAGNOSIS — R10.9 ABDOMINAL PAIN OF UNKNOWN ETIOLOGY: Primary | ICD-10-CM

## 2018-08-10 LAB
ALBUMIN SERPL-MCNC: 3.4 G/DL (ref 3.5–4.8)
ALBUMIN/GLOB SERPL: 1 {RATIO} (ref 1–2)
ALP LIVER SERPL-CCNC: 58 U/L (ref 37–98)
ALT SERPL-CCNC: 21 U/L (ref 14–54)
ANION GAP SERPL CALC-SCNC: 3 MMOL/L (ref 0–18)
AST SERPL-CCNC: 21 U/L (ref 15–41)
B-HCG SERPL-ACNC: <1 MIU/ML
BASOPHILS # BLD AUTO: 0.07 X10(3) UL (ref 0–0.1)
BASOPHILS NFR BLD AUTO: 0.9 %
BILIRUB SERPL-MCNC: 0.2 MG/DL (ref 0.1–2)
BILIRUB UR QL STRIP.AUTO: NEGATIVE
BUN BLD-MCNC: 12 MG/DL (ref 8–20)
BUN/CREAT SERPL: 12 (ref 10–20)
C TRACH DNA SPEC QL NAA+PROBE: NEGATIVE
CALCIUM BLD-MCNC: 8.8 MG/DL (ref 8.3–10.3)
CHLORIDE SERPL-SCNC: 108 MMOL/L (ref 101–111)
CLARITY UR REFRACT.AUTO: CLEAR
CO2 SERPL-SCNC: 29 MMOL/L (ref 22–32)
CREAT BLD-MCNC: 1 MG/DL (ref 0.55–1.02)
EOSINOPHIL # BLD AUTO: 0.57 X10(3) UL (ref 0–0.3)
EOSINOPHIL NFR BLD AUTO: 7.2 %
ERYTHROCYTE [DISTWIDTH] IN BLOOD BY AUTOMATED COUNT: 13.6 % (ref 11.5–16)
GLOBULIN PLAS-MCNC: 3.5 G/DL (ref 2.5–3.7)
GLUCOSE BLD-MCNC: 95 MG/DL (ref 70–99)
GLUCOSE UR STRIP.AUTO-MCNC: NEGATIVE MG/DL
HCT VFR BLD AUTO: 37.6 % (ref 34–50)
HGB BLD-MCNC: 12.1 G/DL (ref 12–16)
IMMATURE GRANULOCYTE COUNT: 0.02 X10(3) UL (ref 0–1)
IMMATURE GRANULOCYTE RATIO %: 0.3 %
KETONES UR STRIP.AUTO-MCNC: NEGATIVE MG/DL
LEUKOCYTE ESTERASE UR QL STRIP.AUTO: NEGATIVE
LIPASE: 130 U/L (ref 73–393)
LYMPHOCYTES # BLD AUTO: 2.59 X10(3) UL (ref 0.9–4)
LYMPHOCYTES NFR BLD AUTO: 32.9 %
M PROTEIN MFR SERPL ELPH: 6.9 G/DL (ref 6.1–8.3)
MCH RBC QN AUTO: 26.6 PG (ref 27–33.2)
MCHC RBC AUTO-ENTMCNC: 32.2 G/DL (ref 31–37)
MCV RBC AUTO: 82.6 FL (ref 81–100)
MONOCYTES # BLD AUTO: 0.49 X10(3) UL (ref 0.1–1)
MONOCYTES NFR BLD AUTO: 6.2 %
N GONORRHOEA DNA SPEC QL NAA+PROBE: NEGATIVE
NEUTROPHIL ABS PRELIM: 4.13 X10 (3) UL (ref 1.3–6.7)
NEUTROPHILS # BLD AUTO: 4.13 X10(3) UL (ref 1.3–6.7)
NEUTROPHILS NFR BLD AUTO: 52.5 %
NITRITE UR QL STRIP.AUTO: NEGATIVE
OSMOLALITY SERPL CALC.SUM OF ELEC: 290 MOSM/KG (ref 275–295)
PH UR STRIP.AUTO: 6 [PH] (ref 4.5–8)
PLATELET # BLD AUTO: 357 10(3)UL (ref 150–450)
POCT LOT NUMBER: NORMAL
POCT URINE PREGNANCY: NEGATIVE
POTASSIUM SERPL-SCNC: 4.2 MMOL/L (ref 3.6–5.1)
PROT UR STRIP.AUTO-MCNC: NEGATIVE MG/DL
RBC # BLD AUTO: 4.55 X10(6)UL (ref 3.8–5.1)
RED CELL DISTRIBUTION WIDTH-SD: 40.8 FL (ref 35.1–46.3)
SODIUM SERPL-SCNC: 140 MMOL/L (ref 136–144)
SP GR UR STRIP.AUTO: 1.01 (ref 1–1.03)
UROBILINOGEN UR STRIP.AUTO-MCNC: <2 MG/DL
WBC # BLD AUTO: 7.9 X10(3) UL (ref 4–13)

## 2018-08-10 PROCEDURE — 87491 CHLMYD TRACH DNA AMP PROBE: CPT | Performed by: EMERGENCY MEDICINE

## 2018-08-10 PROCEDURE — 87591 N.GONORRHOEAE DNA AMP PROB: CPT | Performed by: EMERGENCY MEDICINE

## 2018-08-10 PROCEDURE — 74177 CT ABD & PELVIS W/CONTRAST: CPT | Performed by: EMERGENCY MEDICINE

## 2018-08-10 PROCEDURE — 96361 HYDRATE IV INFUSION ADD-ON: CPT

## 2018-08-10 PROCEDURE — 76830 TRANSVAGINAL US NON-OB: CPT | Performed by: EMERGENCY MEDICINE

## 2018-08-10 PROCEDURE — 76856 US EXAM PELVIC COMPLETE: CPT | Performed by: EMERGENCY MEDICINE

## 2018-08-10 PROCEDURE — 87510 GARDNER VAG DNA DIR PROBE: CPT | Performed by: EMERGENCY MEDICINE

## 2018-08-10 PROCEDURE — 83690 ASSAY OF LIPASE: CPT | Performed by: EMERGENCY MEDICINE

## 2018-08-10 PROCEDURE — 85025 COMPLETE CBC W/AUTO DIFF WBC: CPT | Performed by: EMERGENCY MEDICINE

## 2018-08-10 PROCEDURE — 99284 EMERGENCY DEPT VISIT MOD MDM: CPT

## 2018-08-10 PROCEDURE — 81001 URINALYSIS AUTO W/SCOPE: CPT | Performed by: EMERGENCY MEDICINE

## 2018-08-10 PROCEDURE — 84702 CHORIONIC GONADOTROPIN TEST: CPT | Performed by: EMERGENCY MEDICINE

## 2018-08-10 PROCEDURE — 96372 THER/PROPH/DIAG INJ SC/IM: CPT

## 2018-08-10 PROCEDURE — 87480 CANDIDA DNA DIR PROBE: CPT | Performed by: EMERGENCY MEDICINE

## 2018-08-10 PROCEDURE — 96365 THER/PROPH/DIAG IV INF INIT: CPT

## 2018-08-10 PROCEDURE — 99285 EMERGENCY DEPT VISIT HI MDM: CPT

## 2018-08-10 PROCEDURE — 93975 VASCULAR STUDY: CPT | Performed by: EMERGENCY MEDICINE

## 2018-08-10 PROCEDURE — 87660 TRICHOMONAS VAGIN DIR PROBE: CPT | Performed by: EMERGENCY MEDICINE

## 2018-08-10 PROCEDURE — 80053 COMPREHEN METABOLIC PANEL: CPT | Performed by: EMERGENCY MEDICINE

## 2018-08-10 RX ORDER — DOXYCYCLINE HYCLATE 100 MG/1
100 CAPSULE ORAL 2 TIMES DAILY
Qty: 28 CAPSULE | Refills: 0 | Status: SHIPPED | OUTPATIENT
Start: 2018-08-10 | End: 2018-08-24

## 2018-08-10 RX ORDER — METRONIDAZOLE 500 MG/1
500 TABLET ORAL 2 TIMES DAILY
Qty: 14 TABLET | Refills: 0 | Status: SHIPPED | OUTPATIENT
Start: 2018-08-10 | End: 2018-08-17

## 2018-08-10 NOTE — ED PROVIDER NOTES
Patient Seen in: [facility] Emergency Department    History   Patient presents with:  Abdomen/Flank Pain (GI/)    Stated Complaint: Extreme abd pain    HPI    The patient is a 42-year-old obese female who states that she has had a long history of above.    Physical Exam   ED Triage Vitals [08/10/18 0317]  BP: 136/72  Pulse: 72  Resp: 18  Temp: 97.2 °F (36.2 °C)  Temp src: Temporal  SpO2: n/a  O2 Device: n/a    Current:/52   Pulse 68   Temp 97.2 °F (36.2 °C) (Temporal)   Resp 18   LMP 07/26/20 other components within normal limits   URINALYSIS WITH CULTURE REFLEX - Abnormal; Notable for the following:     Blood Urine Small (*)     Bacteria Urine Rare (*)     Squamous Epi.  Cells Moderate (*)     Mucous Urine 1+ (*)     All other components within ED.  Urinalysis appears contaminated with skin pooja. Remainder of her labs look reassuring. CT of her abdomen and pelvis shows no acute pathology, however there is some asymmetric enlargement of the left adnexa. There is also an incidental hemangioma.

## 2018-08-22 ENCOUNTER — OFFICE VISIT (OUTPATIENT)
Dept: FAMILY MEDICINE CLINIC | Facility: CLINIC | Age: 38
End: 2018-08-22
Payer: COMMERCIAL

## 2018-08-22 VITALS
HEIGHT: 65 IN | BODY MASS INDEX: 37.74 KG/M2 | OXYGEN SATURATION: 99 % | WEIGHT: 226.5 LBS | DIASTOLIC BLOOD PRESSURE: 78 MMHG | HEART RATE: 88 BPM | SYSTOLIC BLOOD PRESSURE: 120 MMHG | RESPIRATION RATE: 16 BRPM

## 2018-08-22 DIAGNOSIS — Z09 HOSPITAL DISCHARGE FOLLOW-UP: Primary | ICD-10-CM

## 2018-08-22 DIAGNOSIS — E66.9 OBESITY (BMI 30-39.9): ICD-10-CM

## 2018-08-22 DIAGNOSIS — R10.32 LEFT LOWER QUADRANT PAIN: ICD-10-CM

## 2018-08-22 DIAGNOSIS — N76.0 BV (BACTERIAL VAGINOSIS): ICD-10-CM

## 2018-08-22 DIAGNOSIS — B96.89 BV (BACTERIAL VAGINOSIS): ICD-10-CM

## 2018-08-22 PROCEDURE — 99214 OFFICE O/P EST MOD 30 MIN: CPT | Performed by: FAMILY MEDICINE

## 2018-08-22 RX ORDER — METRONIDAZOLE 7.5 MG/G
1 GEL VAGINAL NIGHTLY
Qty: 1 TUBE | Refills: 0 | Status: SHIPPED | OUTPATIENT
Start: 2018-08-22 | End: 2018-08-27

## 2018-08-22 NOTE — PROGRESS NOTES
Xavi Hung is a 34year old female. HPI:   Pt. Is here for ER follow up on abdominal pain. PT. Has a CT of her abdomen and US of pelvis which were wnl. GC/Chlam are negative. BV was positive in vaginosis panel. Pt. Has leptin resistance.   Lucien 111 mmol/L   CO2 29.0 22.0 - 32.0 mmol/L   Anion Gap 3 0 - 18 mmol/L   BUN 12 8 - 20 mg/dL   Creatinine 1.00 0.55 - 1.02 mg/dL   BUN/CREA Ratio 12.0 10.0 - 20.0   Calcium, Total 8.8 8.3 - 10.3 mg/dL   Calculated Osmolality 290 275 - 295 mOsm/kg   GFR, Non- Range   Hold Gold Auto Resulted    -CHLAMYDIA/GONOCOCCUS, KIKA   Result Value Ref Range   Chlamydia Trachomatis Amplified RNA Negative Negative   Neisseria Gonorrhoeae Amplified RNA Negative Negative   Chlam/GC Source Cervix    -VAGINITIS/VAGINOSIS, DNA PRO lesions  NECK: supple,no adenopathy,no bruits  LUNGS: clear to auscultation  CARDIO: RRR without murmur  GI: soft, good BS's; nontender  EXTREMITIES: no cyanosis, clubbing or edema    ASSESSMENT AND PLAN:   Hospital discharge follow-up  (primary encounter

## 2018-08-26 ENCOUNTER — APPOINTMENT (OUTPATIENT)
Dept: GENERAL RADIOLOGY | Age: 38
End: 2018-08-26
Attending: PHYSICIAN ASSISTANT
Payer: COMMERCIAL

## 2018-08-26 ENCOUNTER — HOSPITAL ENCOUNTER (OUTPATIENT)
Age: 38
Discharge: HOME | End: 2018-08-26
Payer: COMMERCIAL

## 2018-08-26 VITALS
HEART RATE: 75 BPM | SYSTOLIC BLOOD PRESSURE: 133 MMHG | TEMPERATURE: 98 F | RESPIRATION RATE: 22 BRPM | OXYGEN SATURATION: 100 % | DIASTOLIC BLOOD PRESSURE: 88 MMHG

## 2018-08-26 DIAGNOSIS — S82.892A CLOSED FRACTURE OF LEFT ANKLE, INITIAL ENCOUNTER: Primary | ICD-10-CM

## 2018-08-26 DIAGNOSIS — S93.402A MODERATE LEFT ANKLE SPRAIN, INITIAL ENCOUNTER: ICD-10-CM

## 2018-08-26 PROCEDURE — 29515 APPLICATION SHORT LEG SPLINT: CPT

## 2018-08-26 PROCEDURE — 99214 OFFICE O/P EST MOD 30 MIN: CPT

## 2018-08-26 PROCEDURE — 73610 X-RAY EXAM OF ANKLE: CPT | Performed by: PHYSICIAN ASSISTANT

## 2018-08-26 PROCEDURE — 73630 X-RAY EXAM OF FOOT: CPT | Performed by: PHYSICIAN ASSISTANT

## 2018-08-26 RX ORDER — IBUPROFEN 600 MG/1
600 TABLET ORAL ONCE
Status: COMPLETED | OUTPATIENT
Start: 2018-08-26 | End: 2018-08-26

## 2018-08-26 RX ORDER — NAPROXEN 500 MG/1
500 TABLET ORAL 2 TIMES DAILY PRN
Qty: 20 TABLET | Refills: 0 | Status: SHIPPED | OUTPATIENT
Start: 2018-08-26 | End: 2018-09-02

## 2018-08-26 NOTE — ED PROVIDER NOTES
Patient Seen in: [facility]    History   Patient presents with:   Ankle Injury    Stated Complaint: R foot/injury    HPI    26-year-old female here with complaint of right foot and ankle pain after she tripped during the school r Head: Normocephalic and atraumatic.    Right Ear: External ear normal.   Left Ear: External ear normal.   Nose: Nose normal.   Mouth/Throat: Oropharynx is clear and moist.   Eyes: Conjunctivae and EOM are normal. Pupils are equal, round, and reactive to l MD Mcnair Foot, Complete (min 3 Views), Right (cpt=73630)    Result Date: 8/26/2018  PROCEDURE:  XR FOOT, COMPLETE (MIN 3 VIEWS), RIGHT (CPT=73630)  TECHNIQUE:  AP, oblique, and lateral views were obtained.   COMPARISON:  TADEO CRUZ FOOT, COMPLE appointment as soon as possible for a visit       Edwige Marroquin MD  [address] [place] [address] [address] [facility]          Mesha Loud, [address] [place] [address]

## 2018-08-26 NOTE — ED INITIAL ASSESSMENT (HPI)
Pt was running in a night race last evening when she tripped and injured her rt ankle. Very swollen and painful.

## 2018-10-09 ENCOUNTER — HOSPITAL (OUTPATIENT)
Dept: OTHER | Age: 38
End: 2018-10-09
Attending: ORTHOPAEDIC SURGERY

## 2018-11-01 ENCOUNTER — HOSPITAL (OUTPATIENT)
Dept: OTHER | Age: 38
End: 2018-11-01
Attending: ORTHOPAEDIC SURGERY

## 2018-12-01 ENCOUNTER — HOSPITAL (OUTPATIENT)
Dept: OTHER | Age: 38
End: 2018-12-01
Attending: ORTHOPAEDIC SURGERY

## 2018-12-19 NOTE — LETTER
Patient Name: Rose Handley  YOB: 1980          MRN :  [id number]  Date:  2/1/2023  Referring Physician:  No ref. provider found    Discharge Summary  Pt has attended 12 visits in Physical Therapy. Subjective: The patient reports that it took two days to feel better from the EMG. No changes since the last session, except she feels a bit stronger. She states that she hasn't had a lot of tingling- she had a little last night. Doesn't have a follow up scheduled with the neurologist since it is recovering, she is to follow up if her dropfoot returns. Pain: 0/10 pain      Objective: There is no drop foot noted with ambulation in clinic during today's appointment. Pt is able to hold dorsiflexion partial range with heel walking, it fatigues with >5 steps and starts to drop    DF AROM with knee extended: L 10 deg    Hip abduction 5/5 MALIA    Dorsiflexion 5/5 MMT  Eversion MMT 5/5  Great toe extension: 4+/5 on L      SLR: equal bilaterally, stretching into lower extremities, no pain or tingling. Assessment: The patient has improvements in her great toe extension strength since the previous session, it fatigues with held MMT. She can take a few steps with heel walking, however she has impaired endurance with this and it fatigues. The patient at this time has met long term goals and will be discharged from this plan of care for physical therapy at this time. Goals:   (to be met in 14 visits)   1. The patient will demonstrate ambulation in the PT clinic with normal mechanics including reduction of dropfoot for short distances MET  2. The patient will demonstrate the ability to raise her foot into dorsiflexion against gravity MET  3. The patient will demonstrate at least 4+/5 hip abductor strength bilaterally MET  4. The patient will demonstrate resolution of positive SLR MET  5.  The patient will be independent and adherent in a comprehensive HEP MET    FOTO: 71    Plan: The patient will be discharged from this plan of care for physical therapy. Patient/Family/Caregiver was advised of these findings, precautions, and treatment options and has agreed to actively participate in planning and for this course of care. Thank you for your referral. If you have any questions, please contact me at Dept: [phone]. Sincerely,  Electronically signed by therapist: Ciara Ness, PT       Certification From: 7/4/0185  To:5/2/2023 21st Century Cures Act Notice to Patient: Medical documents like this are made available to patients in the interest of transparency. However, be advised this is a medical document and it is intended as [id number] communication between your medical providers. This medical document may contain abbreviations, assessments, medical data, and results or other terms that are unfamiliar. Medical documents are intended to carry relevant information, facts as evident, and the clinical opinion of the practitioner. As such, this medical document may be written in language that appears blunt or direct. You are encouraged to contact your medical provider and/or [facility] if you have any questions about this medical document. Never smoker

## 2019-01-01 ENCOUNTER — HOSPITAL (OUTPATIENT)
Dept: OTHER | Age: 39
End: 2019-01-01
Attending: ORTHOPAEDIC SURGERY

## 2019-02-04 ENCOUNTER — OFFICE VISIT (OUTPATIENT)
Dept: FAMILY MEDICINE CLINIC | Facility: CLINIC | Age: 39
End: 2019-02-04
Payer: COMMERCIAL

## 2019-02-04 VITALS
HEIGHT: 65 IN | BODY MASS INDEX: 37.65 KG/M2 | DIASTOLIC BLOOD PRESSURE: 80 MMHG | SYSTOLIC BLOOD PRESSURE: 110 MMHG | WEIGHT: 226 LBS | RESPIRATION RATE: 16 BRPM | TEMPERATURE: 98 F | HEART RATE: 64 BPM

## 2019-02-04 DIAGNOSIS — N93.9 ABNORMAL UTERINE BLEEDING: Primary | ICD-10-CM

## 2019-02-04 DIAGNOSIS — N92.6 IRREGULAR BLEEDING: ICD-10-CM

## 2019-02-04 DIAGNOSIS — E55.9 VITAMIN D DEFICIENCY: ICD-10-CM

## 2019-02-04 DIAGNOSIS — B37.9 YEAST INFECTION: ICD-10-CM

## 2019-02-04 DIAGNOSIS — E66.9 OBESITY (BMI 30-39.9): ICD-10-CM

## 2019-02-04 DIAGNOSIS — Z00.00 LABORATORY EXAMINATION ORDERED AS PART OF A ROUTINE GENERAL MEDICAL EXAMINATION: ICD-10-CM

## 2019-02-04 PROCEDURE — 99214 OFFICE O/P EST MOD 30 MIN: CPT | Performed by: FAMILY MEDICINE

## 2019-02-04 RX ORDER — FLUCONAZOLE 150 MG/1
150 TABLET ORAL ONCE
Qty: 2 TABLET | Refills: 0 | Status: SHIPPED | OUTPATIENT
Start: 2019-02-04 | End: 2020-12-16

## 2019-02-04 NOTE — PROGRESS NOTES
Rene Collado is a 34year old female. HPI:   Pt. Complains of break through bleeding for the past 2-3 months. Bleeding after sex last week. May have yeast.  Pelvic ultrasound 8/2018 wnlCelso [facility]os seeing Dr. Opal Koehler and Thuan made her feels ag Yellow    Clarity Urine Clear Clear    Spec Gravity 1.008 1.001 - 1.030    Glucose Urine Negative Negative mg/dl    Bilirubin Urine Negative Negative    Ketones Urine Negative Negative mg/dL    Blood Urine Small (A) Negative    pH Urine 6.0 4.5 - 8.0    Pr 34.0 - 50.0 %    .0 150.0 - 450.0 10(3)uL    MCV 82.6 81.0 - 100.0 fL    MCH 26.6 (L) 27.0 - 33.2 pg    MCHC 32.2 31.0 - 37.0 g/dL    RDW 13.6 11.5 - 16.0 %    RDW-SD 40.8 35.1 - 46.3 fL    Neutrophil Absolute Prelim 4.13 1.30 - 6.70 x10 (3) uL    N 25-Hydroxy [E]      TSH W Reflex To Free T4 [E]      Meds & Refills for this Visit:  Requested Prescriptions     Signed Prescriptions Disp Refills   • fluconazole (DIFLUCAN) 150 MG Oral Tab 2 tablet 0     Sig: Take 1 tablet (150 mg total) by mouth once for

## 2019-04-08 ENCOUNTER — OFFICE VISIT (OUTPATIENT)
Dept: FAMILY MEDICINE CLINIC | Facility: CLINIC | Age: 39
End: 2019-04-08
Payer: COMMERCIAL

## 2019-04-08 VITALS
SYSTOLIC BLOOD PRESSURE: 124 MMHG | HEART RATE: 72 BPM | DIASTOLIC BLOOD PRESSURE: 86 MMHG | OXYGEN SATURATION: 96 % | TEMPERATURE: 99 F | RESPIRATION RATE: 16 BRPM

## 2019-04-08 DIAGNOSIS — Z02.9 ENCOUNTERS FOR ADMINISTRATIVE PURPOSE: Primary | ICD-10-CM

## 2019-04-08 NOTE — PATIENT INSTRUCTIONS
You should see your eye doctor today for further evaluation. Call our clinic if they are unable to get you in.

## 2019-04-08 NOTE — PROGRESS NOTES
Pt presented with c/o medial right eye redness x 3 days. Reports sensation of pressure and swelling behind eye. Reports awoke this morning and her \"eyeball was throbbing\". Denies discharge, crusting, tearing, change in vision, or sensitivity to light.

## 2019-04-14 NOTE — H&P
CC: Annual Physical Exam    HPI:   Boyd Patel is a 37year old female who presents for a complete physical exam. Symptoms: periods are regular. Patient complains of nothing. Seeing Dr. Michelle Gupta soon for her irregular periods.       Wt Readings from Renal Tubular Epithelial Cells None Seen Small /LPF    Transitional Cells None Seen Small /LPF    Mucous Urine 1+ (A) None Seen    Yeast Urine None Seen None Seen   HCG, BETA SUBUNIT (QUANT PREGNANCY TEST)   Result Value Ref Range    Hcg Quantitative <1 x10(3) uL    Neutrophil % 52.5 %    Lymphocyte % 32.9 %    Monocyte % 6.2 %    Eosinophil % 7.2 %    Basophil % 0.9 %    Immature Granulocyte % 0.3 %         Current Outpatient Medications:  TURMERIC CURCUMIN OR Take by mouth.  Disp:  Rfl:    Probiotic Prod headaches  PSYCHE: denies depression or anxiety  HEMATOLOGIC: denies hx of anemia  ENDOCRINE: denies thyroid history  ALL/ASTHMA: denies hx of allergy or asthma    EXAM:   /68 (BP Location: Right arm, Patient Position: Sitting, Cuff Size: large)   Pu cancer  Encounter for screening mammogram for breast cancer  Screening for genitourinary condition  Screening for endocrine, metabolic and immunity disorder  Screening examination for venereal disease    Orders Placed This Encounter      Urinalysis, Routin

## 2019-04-15 ENCOUNTER — OFFICE VISIT (OUTPATIENT)
Dept: FAMILY MEDICINE CLINIC | Facility: CLINIC | Age: 39
End: 2019-04-15
Payer: COMMERCIAL

## 2019-04-15 VITALS
SYSTOLIC BLOOD PRESSURE: 110 MMHG | TEMPERATURE: 97 F | HEART RATE: 72 BPM | BODY MASS INDEX: 37.82 KG/M2 | HEIGHT: 65 IN | RESPIRATION RATE: 14 BRPM | DIASTOLIC BLOOD PRESSURE: 68 MMHG | WEIGHT: 227 LBS

## 2019-04-15 DIAGNOSIS — Z13.89 SCREENING FOR GENITOURINARY CONDITION: ICD-10-CM

## 2019-04-15 DIAGNOSIS — Z00.00 ROUTINE GENERAL MEDICAL EXAMINATION AT A HEALTH CARE FACILITY: Primary | ICD-10-CM

## 2019-04-15 DIAGNOSIS — Z13.0 SCREENING FOR ENDOCRINE, METABOLIC AND IMMUNITY DISORDER: ICD-10-CM

## 2019-04-15 DIAGNOSIS — Z12.4 SCREENING FOR CERVICAL CANCER: ICD-10-CM

## 2019-04-15 DIAGNOSIS — Z11.3 SCREENING EXAMINATION FOR VENEREAL DISEASE: ICD-10-CM

## 2019-04-15 DIAGNOSIS — Z13.29 SCREENING FOR ENDOCRINE, METABOLIC AND IMMUNITY DISORDER: ICD-10-CM

## 2019-04-15 DIAGNOSIS — Z12.31 ENCOUNTER FOR SCREENING MAMMOGRAM FOR BREAST CANCER: ICD-10-CM

## 2019-04-15 DIAGNOSIS — Z00.00 LABORATORY EXAMINATION ORDERED AS PART OF A ROUTINE GENERAL MEDICAL EXAMINATION: ICD-10-CM

## 2019-04-15 DIAGNOSIS — Z13.228 SCREENING FOR ENDOCRINE, METABOLIC AND IMMUNITY DISORDER: ICD-10-CM

## 2019-04-15 PROCEDURE — 87624 HPV HI-RISK TYP POOLED RSLT: CPT | Performed by: FAMILY MEDICINE

## 2019-04-15 PROCEDURE — 99396 PREV VISIT EST AGE 40-64: CPT | Performed by: FAMILY MEDICINE

## 2019-04-15 PROCEDURE — 87591 N.GONORRHOEAE DNA AMP PROB: CPT | Performed by: FAMILY MEDICINE

## 2019-04-15 PROCEDURE — 81001 URINALYSIS AUTO W/SCOPE: CPT | Performed by: FAMILY MEDICINE

## 2019-04-15 PROCEDURE — 87491 CHLMYD TRACH DNA AMP PROBE: CPT | Performed by: FAMILY MEDICINE

## 2019-04-16 DIAGNOSIS — R82.90 ABNORMAL URINE: Primary | ICD-10-CM

## 2019-04-16 DIAGNOSIS — R31.9 HEMATURIA, UNSPECIFIED TYPE: ICD-10-CM

## 2019-05-01 ENCOUNTER — OFFICE VISIT (OUTPATIENT)
Dept: OBGYN CLINIC | Facility: CLINIC | Age: 39
End: 2019-05-01
Payer: COMMERCIAL

## 2019-05-01 VITALS
HEIGHT: 64.5 IN | WEIGHT: 229 LBS | BODY MASS INDEX: 38.62 KG/M2 | SYSTOLIC BLOOD PRESSURE: 136 MMHG | DIASTOLIC BLOOD PRESSURE: 86 MMHG

## 2019-05-01 DIAGNOSIS — N93.0 PCB (POST COITAL BLEEDING): ICD-10-CM

## 2019-05-01 DIAGNOSIS — N92.0 MENORRHAGIA WITH REGULAR CYCLE: Primary | ICD-10-CM

## 2019-05-01 PROCEDURE — 99203 OFFICE O/P NEW LOW 30 MIN: CPT | Performed by: OBSTETRICS & GYNECOLOGY

## 2019-05-01 NOTE — PATIENT INSTRUCTIONS
Dysfunctional Uterine Bleeding    Dysfunctional uterine bleeding, also called abnormal uterine bleeding, is a condition in which bleeding is abnormal and occurs at unexpected times of the month.  This happens because of changes in the hormones that help c · Signs of anemia, such as pale skin, extreme fatigue or weakness, or shortness of breath  · Dizziness or fainting   Date Last Reviewed: 11/1/2017  © 9385-1977 [employer]. [address] [facility], [address] [place] [address]. All rights reserved.  This · headache  · muscle or joint aches  · sinus and nasal problems  · stomach pain  · tiredness  What may interact with this medicine?   Do not take this medicine with any of the following medications:  · estrogens  · birth control pills, patches, injections, LEVONORGESTREL IUD (JAMAR kelly) is a contraceptive (birth control) device. The device is placed inside the uterus by a healthcare professional. It is used to prevent pregnancy.  This device can also be used to treat heavy bleeding that occurs duri · medicines to treat seizures like carbamazepine, ethotoin, felbamate, oxcarbazepine, phenytoin, topiramate  · modafinil  · pioglitazone  · rifabutin  · rifampin  · rifapentine  · some medicines to treat HIV infection like atazanavir, efavirenz, indinavir, You can check the placement of the IUD yourself by reaching up to the top of your vagina with clean fingers to feel the threads. Do not pull on the threads. It is a good habit to check placement after each menstrual period.  Call your doctor right away if y · The day before surgery, you may be given medicine or a special substance (laminaria) may be put into the opening to the uterus (cervix). This widens the opening.   · To help prevent problems with anesthesia, do not eat or drink anything 10 hours before reyes · Persistent or increased abdominal pain  · Shortness of breath   Date Last Reviewed: 6/1/2017  © 9405-3642 [employer]. [address] [facility], [address] [place] [address]. All rights reserved.  This information is not intended as a substitute for prof

## 2019-05-01 NOTE — PROGRESS NOTES
Jessica Stovall is a 37year old female. HPI:   Patient presents with:  Vaginal Problem: PT states she has been having breakthrough bleeding, heavy painful periods with clotting and bleeding with intercourse       States menses occur q 25-26 days.  Sta Take by mouth. Disp:  Rfl:    Probiotic Product (VSL#3 DS OR) Take by mouth. Disp:  Rfl:    MULTIVITAMIN TAB/CAP Take 1 tablet by mouth daily. Disp:  Rfl:        Allergies:  No Known Allergies      ROS:   As above    PHYSICAL EXAM:   . ..Vulva:  nl.   Intro

## 2019-05-03 ENCOUNTER — LAB ENCOUNTER (OUTPATIENT)
Dept: LAB | Age: 39
End: 2019-05-03
Attending: FAMILY MEDICINE
Payer: COMMERCIAL

## 2019-05-03 DIAGNOSIS — R82.90 ABNORMAL URINE: ICD-10-CM

## 2019-05-03 DIAGNOSIS — R31.9 HEMATURIA, UNSPECIFIED TYPE: ICD-10-CM

## 2019-05-03 DIAGNOSIS — N93.0 PCB (POST COITAL BLEEDING): ICD-10-CM

## 2019-05-03 DIAGNOSIS — N93.9 ABNORMAL UTERINE BLEEDING: ICD-10-CM

## 2019-05-03 DIAGNOSIS — Z00.00 ROUTINE GENERAL MEDICAL EXAMINATION AT A HEALTH CARE FACILITY: ICD-10-CM

## 2019-05-03 DIAGNOSIS — N92.0 MENORRHAGIA WITH REGULAR CYCLE: ICD-10-CM

## 2019-05-03 DIAGNOSIS — Z13.228 SCREENING FOR ENDOCRINE, METABOLIC AND IMMUNITY DISORDER: ICD-10-CM

## 2019-05-03 DIAGNOSIS — E55.9 VITAMIN D DEFICIENCY: ICD-10-CM

## 2019-05-03 DIAGNOSIS — Z13.29 SCREENING FOR ENDOCRINE, METABOLIC AND IMMUNITY DISORDER: ICD-10-CM

## 2019-05-03 DIAGNOSIS — Z13.0 SCREENING FOR ENDOCRINE, METABOLIC AND IMMUNITY DISORDER: ICD-10-CM

## 2019-05-03 DIAGNOSIS — Z00.00 LABORATORY EXAMINATION ORDERED AS PART OF A ROUTINE GENERAL MEDICAL EXAMINATION: ICD-10-CM

## 2019-05-03 PROCEDURE — 83002 ASSAY OF GONADOTROPIN (LH): CPT | Performed by: OBSTETRICS & GYNECOLOGY

## 2019-05-03 PROCEDURE — 86706 HEP B SURFACE ANTIBODY: CPT | Performed by: FAMILY MEDICINE

## 2019-05-03 PROCEDURE — 36415 COLL VENOUS BLD VENIPUNCTURE: CPT | Performed by: FAMILY MEDICINE

## 2019-05-03 PROCEDURE — 83001 ASSAY OF GONADOTROPIN (FSH): CPT | Performed by: OBSTETRICS & GYNECOLOGY

## 2019-05-03 PROCEDURE — 82670 ASSAY OF TOTAL ESTRADIOL: CPT | Performed by: OBSTETRICS & GYNECOLOGY

## 2019-05-03 PROCEDURE — 80061 LIPID PANEL: CPT | Performed by: FAMILY MEDICINE

## 2019-05-03 PROCEDURE — 81003 URINALYSIS AUTO W/O SCOPE: CPT | Performed by: FAMILY MEDICINE

## 2019-05-03 PROCEDURE — 80053 COMPREHEN METABOLIC PANEL: CPT | Performed by: FAMILY MEDICINE

## 2019-05-03 PROCEDURE — 84439 ASSAY OF FREE THYROXINE: CPT | Performed by: OBSTETRICS & GYNECOLOGY

## 2019-05-03 PROCEDURE — 82306 VITAMIN D 25 HYDROXY: CPT | Performed by: FAMILY MEDICINE

## 2019-05-03 PROCEDURE — 87389 HIV-1 AG W/HIV-1&-2 AB AG IA: CPT | Performed by: FAMILY MEDICINE

## 2019-05-03 PROCEDURE — 85025 COMPLETE CBC W/AUTO DIFF WBC: CPT | Performed by: FAMILY MEDICINE

## 2019-05-03 PROCEDURE — 84443 ASSAY THYROID STIM HORMONE: CPT | Performed by: OBSTETRICS & GYNECOLOGY

## 2019-05-05 DIAGNOSIS — E55.9 VITAMIN D DEFICIENCY: Primary | ICD-10-CM

## 2019-05-05 DIAGNOSIS — D64.9 ANEMIA, UNSPECIFIED TYPE: ICD-10-CM

## 2019-05-05 RX ORDER — ERGOCALCIFEROL 1.25 MG/1
50000 CAPSULE ORAL WEEKLY
Qty: 8 CAPSULE | Refills: 0 | Status: SHIPPED | OUTPATIENT
Start: 2019-05-05 | End: 2019-06-04

## 2019-06-12 ENCOUNTER — HOSPITAL ENCOUNTER (OUTPATIENT)
Dept: ULTRASOUND IMAGING | Facility: HOSPITAL | Age: 39
Discharge: HOME | End: 2019-06-12
Attending: OBSTETRICS & GYNECOLOGY
Payer: COMMERCIAL

## 2019-06-12 ENCOUNTER — HOSPITAL ENCOUNTER (OUTPATIENT)
Dept: MAMMOGRAPHY | Facility: HOSPITAL | Age: 39
Discharge: HOME | End: 2019-06-12
Attending: FAMILY MEDICINE
Payer: COMMERCIAL

## 2019-06-12 DIAGNOSIS — N92.0 MENORRHAGIA WITH REGULAR CYCLE: ICD-10-CM

## 2019-06-12 DIAGNOSIS — Z12.31 ENCOUNTER FOR SCREENING MAMMOGRAM FOR BREAST CANCER: ICD-10-CM

## 2019-06-12 DIAGNOSIS — N93.0 PCB (POST COITAL BLEEDING): ICD-10-CM

## 2019-06-12 PROCEDURE — 76856 US EXAM PELVIC COMPLETE: CPT | Performed by: OBSTETRICS & GYNECOLOGY

## 2019-06-12 PROCEDURE — 77063 BREAST TOMOSYNTHESIS BI: CPT | Performed by: FAMILY MEDICINE

## 2019-06-12 PROCEDURE — 77067 SCR MAMMO BI INCL CAD: CPT | Performed by: FAMILY MEDICINE

## 2019-06-12 PROCEDURE — 76830 TRANSVAGINAL US NON-OB: CPT | Performed by: OBSTETRICS & GYNECOLOGY

## 2019-06-13 ENCOUNTER — MED REC SCAN ONLY (OUTPATIENT)
Dept: FAMILY MEDICINE CLINIC | Facility: CLINIC | Age: 39
End: 2019-06-13

## 2019-06-13 ENCOUNTER — OFFICE VISIT (OUTPATIENT)
Dept: FAMILY MEDICINE CLINIC | Facility: CLINIC | Age: 39
End: 2019-06-13
Payer: COMMERCIAL

## 2019-06-13 VITALS
HEIGHT: 64.5 IN | BODY MASS INDEX: 38.45 KG/M2 | HEART RATE: 73 BPM | SYSTOLIC BLOOD PRESSURE: 120 MMHG | DIASTOLIC BLOOD PRESSURE: 70 MMHG | TEMPERATURE: 97 F | RESPIRATION RATE: 16 BRPM | WEIGHT: 228 LBS | OXYGEN SATURATION: 99 %

## 2019-06-13 DIAGNOSIS — Z79.899 MEDICATION MANAGEMENT: ICD-10-CM

## 2019-06-13 DIAGNOSIS — E66.9 OBESITY (BMI 30-39.9): Primary | ICD-10-CM

## 2019-06-13 PROCEDURE — 99214 OFFICE O/P EST MOD 30 MIN: CPT | Performed by: FAMILY MEDICINE

## 2019-06-13 PROCEDURE — 93000 ELECTROCARDIOGRAM COMPLETE: CPT | Performed by: FAMILY MEDICINE

## 2019-06-13 RX ORDER — PHENTERMINE HYDROCHLORIDE 37.5 MG/1
37.5 TABLET ORAL
Qty: 30 TABLET | Refills: 2 | Status: SHIPPED | OUTPATIENT
Start: 2019-06-13 | End: 2020-01-14

## 2019-06-13 NOTE — PROGRESS NOTES
Call to patient; no answer. Left detailed message with results on VM per FYI ok. To call back with questions/concerns or if she would like appt to discuss heavy period management further.

## 2019-06-13 NOTE — PROGRESS NOTES
Jose A Noel is a 37year old female. HPI:   Pt. Is frustrated with her weight. She feels she is going to burst.  She would like to try the phentermine again. She has seen Dr. Margret Fountain in the past.  She is considering intermittent fasting.   Meds revi Range    Cholesterol, Total 154 <200 mg/dL    HDL Cholesterol 61 (H) 40 - 59 mg/dL    Triglycerides 56 30 - 149 mg/dL    LDL Cholesterol 82 <100 mg/dL    VLDL 11 0 - 30 mg/dL    Non HDL Chol 93 <130 mg/dL   VITAMIN D, 25-HYDROXY   Result Value Ref Range Prelim 3.10 1.50 - 7.70 x10 (3) uL    Neutrophil Absolute 3.10 1.50 - 7.70 x10(3) uL    Lymphocyte Absolute 1.97 1.00 - 4.00 x10(3) uL    Monocyte Absolute 0.30 0.10 - 1.00 x10(3) uL    Eosinophil Absolute 0.50 0.00 - 0.70 x10(3) uL    Basophil Absolute 0.

## 2019-06-27 DIAGNOSIS — E55.9 VITAMIN D DEFICIENCY: ICD-10-CM

## 2019-06-28 RX ORDER — ERGOCALCIFEROL 1.25 MG/1
50000 CAPSULE ORAL WEEKLY
Qty: 8 CAPSULE | Refills: 0 | OUTPATIENT
Start: 2019-06-28 | End: 2019-07-28

## 2019-08-23 DIAGNOSIS — E55.9 VITAMIN D DEFICIENCY: ICD-10-CM

## 2019-08-23 NOTE — TELEPHONE ENCOUNTER
Please call patient as a reminder to have pending vit. D and cbc blood work rechecked. Requested to have them rechecked in 3 months from 5/03/19. Thanks!

## 2019-08-27 RX ORDER — ERGOCALCIFEROL 1.25 MG/1
50000 CAPSULE ORAL WEEKLY
Qty: 8 CAPSULE | Refills: 0 | OUTPATIENT
Start: 2019-08-27 | End: 2019-09-26

## 2020-01-14 ENCOUNTER — OFFICE VISIT (OUTPATIENT)
Dept: INTERNAL MEDICINE CLINIC | Facility: CLINIC | Age: 40
End: 2020-01-14
Payer: COMMERCIAL

## 2020-01-14 VITALS
BODY MASS INDEX: 38.47 KG/M2 | RESPIRATION RATE: 18 BRPM | SYSTOLIC BLOOD PRESSURE: 130 MMHG | HEART RATE: 76 BPM | HEIGHT: 64.5 IN | WEIGHT: 228.13 LBS | DIASTOLIC BLOOD PRESSURE: 84 MMHG

## 2020-01-14 DIAGNOSIS — Z51.81 ENCOUNTER FOR THERAPEUTIC DRUG MONITORING: Primary | ICD-10-CM

## 2020-01-14 DIAGNOSIS — K59.04 CHRONIC IDIOPATHIC CONSTIPATION: ICD-10-CM

## 2020-01-14 DIAGNOSIS — E55.9 VITAMIN D DEFICIENCY: ICD-10-CM

## 2020-01-14 DIAGNOSIS — E66.9 OBESITY (BMI 30-39.9): ICD-10-CM

## 2020-01-14 PROCEDURE — 99214 OFFICE O/P EST MOD 30 MIN: CPT | Performed by: NURSE PRACTITIONER

## 2020-01-14 RX ORDER — METFORMIN HYDROCHLORIDE 750 MG/1
1500 TABLET, EXTENDED RELEASE ORAL
Qty: 60 TABLET | Refills: 1 | Status: SHIPPED | OUTPATIENT
Start: 2020-01-14 | End: 2020-04-21

## 2020-01-14 NOTE — PROGRESS NOTES
HISTORY OF PRESENT ILLNESS  Patient presents with:  Weight Problem: pt states she has tried Phentermine \"off and on 3-4 years\", has tried topirmate ER but \"my body did not like it, felt angry\" referred by       Barrongerda Wilkinssailaja is a 37year old disorder: negative  Migraines: negative  Seizures: negative  Joint-related conditions: negative  Liver disease: negative  Renal disease: negative   Diabetes: negative  Thyroid disease: negative   Constipation: chronic constipation, [place] stool scale 3, B intact  NEURO: Oriented times three, full ROM of bilateral UE/LE  PSYCH: pleasant, cooperative, normal mood and affect    Lab Results   Component Value Date    WBC 5.9 05/03/2019    RBC 4.70 05/03/2019    HGB 11.9 (L) 05/03/2019    HCT 38.9 05/03/2019    M Diagnoses and all orders for this visit:    Encounter for therapeutic drug monitoring  -     metFORMIN HCl  MG Oral Tablet 24 Hr; Take 2 tablets (1,500 mg total) by mouth daily with food. Refer to discharge instructions for starting dose.     Obes grain toast with peanut butter. 3.  Reduce carbohydrates which includes sugar items such as sweets as well as rice, pasta, potatoes and bread and make sure to choose whole grain options when having them.   4.  Start a daily probiotic: VSL#3 is recommended,

## 2020-01-14 NOTE — PATIENT INSTRUCTIONS
Welcome to the [facility]...your Lifestyle Renovation begins now! Thank you for placing your trust in our health care team, I look forward to working with you along this journey to better health! Next steps:     1.   Fill be done in your home or place of work with little time commitment. This Wong can also help work on behavior change and improve sleep. Check out [email or web address] org blog for continued daily support and education along this weight loss journey!

## 2020-02-11 ENCOUNTER — TELEPHONE (OUTPATIENT)
Dept: INTERNAL MEDICINE CLINIC | Facility: CLINIC | Age: 40
End: 2020-02-11

## 2020-02-11 ENCOUNTER — OFFICE VISIT (OUTPATIENT)
Dept: INTERNAL MEDICINE CLINIC | Facility: CLINIC | Age: 40
End: 2020-02-11
Payer: COMMERCIAL

## 2020-02-11 VITALS
WEIGHT: 222 LBS | HEART RATE: 78 BPM | DIASTOLIC BLOOD PRESSURE: 80 MMHG | SYSTOLIC BLOOD PRESSURE: 120 MMHG | RESPIRATION RATE: 14 BRPM | BODY MASS INDEX: 37.44 KG/M2 | HEIGHT: 64.5 IN

## 2020-02-11 DIAGNOSIS — E66.9 OBESITY (BMI 30-39.9): Primary | ICD-10-CM

## 2020-02-11 DIAGNOSIS — K59.04 CHRONIC IDIOPATHIC CONSTIPATION: ICD-10-CM

## 2020-02-11 DIAGNOSIS — Z51.81 ENCOUNTER FOR THERAPEUTIC DRUG MONITORING: Primary | ICD-10-CM

## 2020-02-11 DIAGNOSIS — E66.9 OBESITY (BMI 30-39.9): ICD-10-CM

## 2020-02-11 PROCEDURE — 99213 OFFICE O/P EST LOW 20 MIN: CPT | Performed by: NURSE PRACTITIONER

## 2020-02-11 RX ORDER — FLUOXETINE HYDROCHLORIDE 20 MG/1
20 CAPSULE ORAL DAILY
Qty: 30 CAPSULE | Refills: 1 | Status: SHIPPED | OUTPATIENT
Start: 2020-02-11 | End: 2020-04-21

## 2020-02-11 NOTE — PROGRESS NOTES
Oren Anderson is a 37year old female presents today for 1 month follow-up on medical weight loss program for the treatment of overweight, obesity, or morbid obesity.     S:  Current weight Wt Readings from Last 6 Encounters:  02/11/20 : 222 lb (100.7 k fields, breathing non labored  CARDIO: RRR without murmur, normal S1 and S2 without clicks or gallops, no pedal edema.   GI: +BS, soft  NEURO/MS: motor and sensory grossly intact  PSYCH: pleasant, cooperative, normal mood and affect    ASSESSMENT AND PLAN: at your consult), work towards 6# weight loss this month. Be mindful of the impact nutrition has on brain health and how your choices can reduce hunger and cravings. Brain Power!  How Nutrition Affects Your Brain, Not Just Your Body  October 22, 2019 • P can help you stay happy, healthy and focused:  Berries  Blueberries, strawberries, blackberries, raspberries… all of these berries are rich with antioxidants.  In other words, their health properties can reduce inflammation and help your brain not only comm successfully. Emotional eating and overeating can’t really satisfy an insatiable appetite anyway.   And whether or not Quentin Barcenas been trying to use emotional eating to soothe feelings of stress, depression, loneliness, frustration or boredom, in the long run, for health you’ll choose more high fiber foods, such as vegetables, beans, whole grains and fresh fruits, plus healthy high protein foods, like fish, lean poultry and low-fat dairy. 7. Eat mini-meals often.  By eating 5 or 6 small healthy meals a day, incl

## 2020-02-11 NOTE — TELEPHONE ENCOUNTER
Delfino Clark,     Can you please enter a new Dietitian Order for Trish's patient. The last one in Epic was from 2/4/19 and never been used. However, orders are typically only valid for 1 year so we should have a new one to be within guidelines.      I am using

## 2020-02-11 NOTE — PATIENT INSTRUCTIONS
Continue making lifestyle changes that focus on good nutrition, regular exercise and stress management. Medication Plan: Continue current medication regimen, add Fluoxetine daily. Add Miralax 1 capful daily with water for constipation.     Next steps to to your brain either!  A habit like this one can influence:  • Impatience  • Depression/anxiety  • Addiction to sugars and carbs  • Hyperactivity  • Poor appetite control (which starts in your brain)  • Trouble focusing  Foods that [facility] eggs  There are a bunch of super foods out there that boost your brain function and overall mental health. However, you should also pay attention to how much you eat and how often. These factors can also affect your brain.  The bottom line, though, is that emotional distress management is an important life skill. Positive ways to reduce stress include regular exercise, relaxation techniques and getting support from family and friends. 4. Be physically active.  Exercise reduces stress and is a great mood enha [email or web address] com

## 2020-02-13 ENCOUNTER — OFFICE VISIT (OUTPATIENT)
Dept: INTERNAL MEDICINE CLINIC | Facility: CLINIC | Age: 40
End: 2020-02-13
Payer: COMMERCIAL

## 2020-02-13 DIAGNOSIS — E66.9 OBESITY (BMI 30-39.9): ICD-10-CM

## 2020-02-13 PROCEDURE — 97802 MEDICAL NUTRITION INDIV IN: CPT | Performed by: DIETITIAN, REGISTERED

## 2020-02-16 NOTE — PROGRESS NOTES
INITIAL OUTPATIENT NUTRITION CONSULTATION    Nutrition Assessment    Medical Diagnosis: Obesity    Client Age and Gender: 37year old female    Marital Status and Occupation: ,  is having RYGB at [employer].   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 Craves sweets after lunch. Discussed options that are low sugar. Having a breakfast other than PRemier protein on days off of work was encouraged with options suggested. PT avoiding meat.

## 2020-03-06 ENCOUNTER — TELEPHONE (OUTPATIENT)
Dept: FAMILY MEDICINE CLINIC | Facility: CLINIC | Age: 40
End: 2020-03-06

## 2020-03-06 ENCOUNTER — OFFICE VISIT (OUTPATIENT)
Dept: FAMILY MEDICINE CLINIC | Facility: CLINIC | Age: 40
End: 2020-03-06
Payer: COMMERCIAL

## 2020-03-06 VITALS
DIASTOLIC BLOOD PRESSURE: 72 MMHG | WEIGHT: 218 LBS | RESPIRATION RATE: 16 BRPM | HEART RATE: 88 BPM | TEMPERATURE: 100 F | HEIGHT: 65.5 IN | SYSTOLIC BLOOD PRESSURE: 110 MMHG | BODY MASS INDEX: 35.88 KG/M2 | OXYGEN SATURATION: 100 %

## 2020-03-06 DIAGNOSIS — R50.9 FEVER, UNSPECIFIED FEVER CAUSE: ICD-10-CM

## 2020-03-06 DIAGNOSIS — R05.9 COUGH: ICD-10-CM

## 2020-03-06 DIAGNOSIS — J06.9 ACUTE URI: Primary | ICD-10-CM

## 2020-03-06 LAB
ADENOVIRUS PCR:: NEGATIVE
B PERT DNA SPEC QL NAA+PROBE: NEGATIVE
C PNEUM DNA SPEC QL NAA+PROBE: NEGATIVE
CORONAVIRUS 229E PCR:: NEGATIVE
CORONAVIRUS HKU1 PCR:: NEGATIVE
CORONAVIRUS NL63 PCR:: NEGATIVE
CORONAVIRUS OC43 PCR:: NEGATIVE
FLUAV H1 2009 PAND RNA SPEC QL NAA+PROBE: POSITIVE
FLUBV RNA SPEC QL NAA+PROBE: NEGATIVE
METAPNEUMOVIRUS PCR:: NEGATIVE
MYCOPLASMA PNEUMONIA PCR:: NEGATIVE
PARAINFLUENZA 1 PCR:: NEGATIVE
PARAINFLUENZA 2 PCR:: NEGATIVE
PARAINFLUENZA 3 PCR:: NEGATIVE
PARAINFLUENZA 4 PCR:: NEGATIVE
RHINOVIRUS/ENTERO PCR:: NEGATIVE
RSV RNA SPEC QL NAA+PROBE: NEGATIVE

## 2020-03-06 PROCEDURE — 87581 M.PNEUMON DNA AMP PROBE: CPT | Performed by: FAMILY MEDICINE

## 2020-03-06 PROCEDURE — 99213 OFFICE O/P EST LOW 20 MIN: CPT | Performed by: FAMILY MEDICINE

## 2020-03-06 PROCEDURE — 87798 DETECT AGENT NOS DNA AMP: CPT | Performed by: FAMILY MEDICINE

## 2020-03-06 PROCEDURE — 87633 RESP VIRUS 12-25 TARGETS: CPT | Performed by: FAMILY MEDICINE

## 2020-03-06 PROCEDURE — 87486 CHLMYD PNEUM DNA AMP PROBE: CPT | Performed by: FAMILY MEDICINE

## 2020-03-06 RX ORDER — IBUPROFEN 200 MG
200 TABLET ORAL EVERY 6 HOURS PRN
COMMUNITY

## 2020-03-06 NOTE — TELEPHONE ENCOUNTER
1. What are your symptoms? Headache, sore throat, dry raspy cough, ears hurt, and chills      2. How long have you been having these symptoms? Cough started Wednesday, chills started last night      3.  Have you done anything already to treat your symptom

## 2020-03-06 NOTE — PROGRESS NOTES
HPI:   Cookie Stock is a 37year old female who presents for upper respiratory symptoms for  3  days. Patient reports congestion, ear pain, headache; chills, denies fever. Hurts to touch her head and her skin.  Has been taking advil and may be making a no rashes  EYES:denies blurred vision or double vision  HEENT: congested; sore throat, ear pain, facial pain  LUNGS: denies shortness of breath with exertion; cough  CARDIOVASCULAR: denies chest pain on exertion  GI: no nausea or abdominal pain  NEURO: den

## 2020-03-06 NOTE — TELEPHONE ENCOUNTER
S/w pt. Sx's started weds. Denies ever having any fever. sts she \"feels like she has been hit by a truck\". Dry cough. All over h/a. No cp. No sob. No travel. Taking dayquil/nyquil with some relief. Ears hurt. Asking to be seen.     Are you ab

## 2020-03-09 ENCOUNTER — TELEPHONE (OUTPATIENT)
Dept: FAMILY MEDICINE CLINIC | Facility: CLINIC | Age: 40
End: 2020-03-09

## 2020-03-09 NOTE — TELEPHONE ENCOUNTER
1200 call from pt-asking for input from dr Natanael Ang re how long she is contagious-at home and at work, and when she can return to work.    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

## 2020-04-07 DIAGNOSIS — Z51.81 ENCOUNTER FOR THERAPEUTIC DRUG MONITORING: ICD-10-CM

## 2020-04-07 DIAGNOSIS — E66.9 OBESITY (BMI 30-39.9): ICD-10-CM

## 2020-04-07 RX ORDER — FLUOXETINE HYDROCHLORIDE 20 MG/1
CAPSULE ORAL
Qty: 30 CAPSULE | Refills: 0 | OUTPATIENT
Start: 2020-04-07

## 2020-04-07 NOTE — TELEPHONE ENCOUNTER
Yes, let's schedule a TE since this was a new start medication at her last f/u and she is a fairly new patient. Thanks.

## 2020-04-07 NOTE — TELEPHONE ENCOUNTER
Requesting Fluoxetine  LOV: 2/11/20  RTC: one month  Last Relevant Labs: na  Filled: 2/11/20 #30 with 1 refills    No future appointments. Patient cancelled appointment 3/12/20 and never rescheduled. Do you want to refill and schedule tele visit?

## 2020-04-20 DIAGNOSIS — Z51.81 ENCOUNTER FOR THERAPEUTIC DRUG MONITORING: ICD-10-CM

## 2020-04-20 DIAGNOSIS — E66.9 OBESITY (BMI 30-39.9): ICD-10-CM

## 2020-04-20 NOTE — TELEPHONE ENCOUNTER
Requesting Fluoxetine and metformin  LOV: 2/11/20  RTC: one month  Last Relevant Labs: no A1c  Filled: 2/11/20 #30 with 1 refills  Fluoxetine  Filled: 1/14/20 #60 with 1 refills  metformin    No future appointments. Pt cancelled her appt 3/12/20.   Need

## 2020-04-21 ENCOUNTER — VIRTUAL PHONE E/M (OUTPATIENT)
Dept: INTERNAL MEDICINE CLINIC | Facility: CLINIC | Age: 40
End: 2020-04-21
Payer: COMMERCIAL

## 2020-04-21 DIAGNOSIS — F43.9 STRESS: ICD-10-CM

## 2020-04-21 DIAGNOSIS — E66.9 OBESITY (BMI 30-39.9): ICD-10-CM

## 2020-04-21 DIAGNOSIS — K59.04 CHRONIC IDIOPATHIC CONSTIPATION: ICD-10-CM

## 2020-04-21 DIAGNOSIS — Z51.81 ENCOUNTER FOR THERAPEUTIC DRUG MONITORING: Primary | ICD-10-CM

## 2020-04-21 PROCEDURE — 99213 OFFICE O/P EST LOW 20 MIN: CPT | Performed by: NURSE PRACTITIONER

## 2020-04-21 RX ORDER — METFORMIN HYDROCHLORIDE 750 MG/1
1500 TABLET, EXTENDED RELEASE ORAL
Qty: 180 TABLET | Refills: 0 | Status: SHIPPED | OUTPATIENT
Start: 2020-04-21 | End: 2020-09-03

## 2020-04-21 RX ORDER — FLUOXETINE HYDROCHLORIDE 40 MG/1
40 CAPSULE ORAL DAILY
Qty: 90 CAPSULE | Refills: 0 | Status: SHIPPED | OUTPATIENT
Start: 2020-04-21 | End: 2020-09-03

## 2020-04-21 NOTE — PROGRESS NOTES
Virtual Telephone Check-In    Darryl Parry verbally consents to a Virtual/Telephone Check-In visit on 4/21/2020. Patient understands and accepts financial responsibility for any deductible, co-insurance and/or co-pays associated with this service. daily with food. -     FLUoxetine HCl 40 MG Oral Cap; Take 1 capsule (40 mg total) by mouth daily.     Obesity (BMI 30-39.9)  - CPM aside from increase fluoxetine to help with stress eating.  -  at home fitness options  -     metFORMIN HCl  MG

## 2020-04-21 NOTE — PATIENT INSTRUCTIONS
Thank you for taking the time for our virtual encounter today! Here are the next steps and plans we discussed:    1. Continue your medications, aside from increase the fluoxetine to 40 mg daily.   2. Begin to develop a daily exercise, considering at home FR experience one or several of the symptoms listed below:  • Stomach pangs or growling   • Emptiness in the stomach   • Irritability   • Headache   • Low energy/fatigue   • Difficulty concentrating  How Does the Scale Work?   Before you eat, take a moment to Diabetes Association @ www.diabetes. org.  • Last Edited: March 19, 2014, reviewed December 17, 2013    Emotional Eating and Overeating   You have to know how to stop emotional eating and stop overeating if you want to lose weight and keep it off successful too. So be sure you make time for regular physical activity. 5. Create new comforts. Make a list of healthy activities you enjoy. And, whenever you feel the need, treat yourself to something on your list.  6. Start eating healthier.  When you eat for healt

## 2020-04-22 RX ORDER — METFORMIN HYDROCHLORIDE 750 MG/1
1500 TABLET, EXTENDED RELEASE ORAL
Qty: 60 TABLET | Refills: 0 | OUTPATIENT
Start: 2020-04-22

## 2020-04-22 RX ORDER — FLUOXETINE HYDROCHLORIDE 20 MG/1
CAPSULE ORAL
Qty: 30 CAPSULE | Refills: 0 | OUTPATIENT
Start: 2020-04-22

## 2020-04-22 NOTE — TELEPHONE ENCOUNTER
Denied refills as they are receipt confirmed at pharmacy requesting. These were requested before the visit yesterday.

## 2020-04-25 ENCOUNTER — EMPLOYEE HEALTH (OUTPATIENT)
Dept: OTHER | Age: 40
End: 2020-04-25

## 2020-04-25 ENCOUNTER — LAB SERVICES (OUTPATIENT)
Dept: LAB | Age: 40
End: 2020-04-25

## 2020-04-25 DIAGNOSIS — Z20.822 SUSPECTED COVID-19 VIRUS INFECTION: ICD-10-CM

## 2020-04-25 DIAGNOSIS — Z20.822 SUSPECTED COVID-19 VIRUS INFECTION: Primary | ICD-10-CM

## 2020-04-25 LAB
SARS-COV-2 RNA SPEC QL NAA+PROBE: NOT DETECTED
SERVICE CMNT-IMP: NORMAL
SPECIMEN SOURCE: NORMAL

## 2020-04-25 PROCEDURE — 87635 SARS-COV-2 COVID-19 AMP PRB: CPT | Performed by: HOSPITALIST

## 2020-04-26 ENCOUNTER — EMPLOYEE HEALTH (OUTPATIENT)
Dept: OTHER | Age: 40
End: 2020-04-26

## 2020-04-28 ENCOUNTER — TELEPHONE (OUTPATIENT)
Dept: FAMILY MEDICINE CLINIC | Facility: CLINIC | Age: 40
End: 2020-04-28

## 2020-04-28 ENCOUNTER — VIRTUAL PHONE E/M (OUTPATIENT)
Dept: FAMILY MEDICINE CLINIC | Facility: CLINIC | Age: 40
End: 2020-04-28
Payer: COMMERCIAL

## 2020-04-28 DIAGNOSIS — T14.8XXA MUSCLE STRAIN: ICD-10-CM

## 2020-04-28 DIAGNOSIS — M54.50 ACUTE BILATERAL LOW BACK PAIN WITHOUT SCIATICA: Primary | ICD-10-CM

## 2020-04-28 PROCEDURE — 99213 OFFICE O/P EST LOW 20 MIN: CPT | Performed by: FAMILY MEDICINE

## 2020-04-28 RX ORDER — CHOLECALCIFEROL (VITAMIN D3) 50 MCG
1 TABLET ORAL
COMMUNITY
End: 2020-11-10

## 2020-04-28 RX ORDER — OMEGA-3 FATTY ACIDS CAP DELAYED RELEASE 1000 MG 1000 MG
1 CAPSULE DELAYED RELEASE ORAL DAILY
COMMUNITY

## 2020-04-28 RX ORDER — CYCLOBENZAPRINE HCL 10 MG
10 TABLET ORAL 3 TIMES DAILY
Qty: 30 TABLET | Refills: 0 | Status: SHIPPED | OUTPATIENT
Start: 2020-04-28 | End: 2020-05-18

## 2020-04-28 RX ORDER — IBUPROFEN 600 MG/1
600 TABLET ORAL EVERY 8 HOURS PRN
Qty: 60 TABLET | Refills: 0 | Status: SHIPPED | OUTPATIENT
Start: 2020-04-28 | End: 2020-11-10

## 2020-04-28 NOTE — TELEPHONE ENCOUNTER
Pt states she is having back spasms again since sh has not seen chiro. She would like to know if we can call in muscle relaxer. Please advise. Thank you.        [facility] [address] [facility]  [phone], [phone]

## 2020-04-28 NOTE — PROGRESS NOTES
Virtual Telephone Check-In    Amaya Cook verbally consents to a Virtual/Telephone Check-In visit on 04/28/20. Patient understands and accepts financial responsibility for any deductible, co-insurance and/or co-pays associated with this service. Frequency: Monthly or less    Drug use: No       Results for orders placed or performed in visit on 03/06/20   RESPIRATORY PANEL FLU EXPANDED   Result Value Ref Range    Adenovirus PCR: Negative Negative    Coronavirus 229E PCR: Negative Negative    Corona 600 mg 3 times daily with food. Advised no over-the-counter anti-inflammatories. Advised Flexeril 10 mg 3 times daily as needed but to monitor because it can make her very dopey and to most likely try to just take it in the evening.   Advised low back str

## 2020-04-28 NOTE — TELEPHONE ENCOUNTER
See note below, pt states c/o mid back pain towards right, worse at night, taking Ibuprofen OTC not working. Discussed tele visit or video visit pt agrees.

## 2020-05-11 ENCOUNTER — TELEPHONE (OUTPATIENT)
Dept: FAMILY MEDICINE CLINIC | Facility: CLINIC | Age: 40
End: 2020-05-11

## 2020-05-11 NOTE — TELEPHONE ENCOUNTER
1. What are your symptoms? lightheaded dizzy  said she fainted     131/93 at 920 last night   135/91  1018 pm       2. How long have you been having these symptoms? Last night    3. Have you done anything already to treat your symptoms?      no

## 2020-05-11 NOTE — TELEPHONE ENCOUNTER
S/w pt. Reports last night after getting out of the tub she felt tired and light headed/dizzy. Started to walk towards the bed and fell onto the bed and slid off and fell between the bed and dresser. Did not hit her head or injure self.    reports

## 2020-05-12 ENCOUNTER — VIRTUAL PHONE E/M (OUTPATIENT)
Dept: FAMILY MEDICINE CLINIC | Facility: CLINIC | Age: 40
End: 2020-05-12
Payer: COMMERCIAL

## 2020-05-12 DIAGNOSIS — R55 VASO VAGAL EPISODE: Primary | ICD-10-CM

## 2020-05-12 DIAGNOSIS — R55 NEAR SYNCOPE: ICD-10-CM

## 2020-05-12 PROCEDURE — 99213 OFFICE O/P EST LOW 20 MIN: CPT | Performed by: FAMILY MEDICINE

## 2020-05-12 NOTE — PROGRESS NOTES
Virtual Telephone Check-In    Rene Collado verbally consents to a Virtual/Telephone Check-In visit on 05/12/20. Patient understands and accepts financial responsibility for any deductible, co-insurance and/or co-pays associated with this service. 0  ibuprofen 200 MG Oral Tab, Take 200 mg by mouth every 6 (six) hours as needed for Pain., Disp: , Rfl:   Tranexamic Acid (LYSTEDA) 650 MG Oral Tab, Take 2 tablets (1,300 mg total) by mouth 3 (three) times daily.  (Patient not taking: Reported on 4/28/2020 throat  LUNGS: denies shortness of breath with exertion  CARDIOVASCULAR: denies chest pain on exertion  GI: denies abdominal pain,denies heartburn  : no urinary complaints  NEURO: light head/dizzy feeling for a few seconds  MUSCULOSKELETAL: denies back p

## 2020-05-14 DIAGNOSIS — E66.9 OBESITY (BMI 30-39.9): ICD-10-CM

## 2020-05-14 DIAGNOSIS — F43.9 STRESS: ICD-10-CM

## 2020-05-14 DIAGNOSIS — Z51.81 ENCOUNTER FOR THERAPEUTIC DRUG MONITORING: ICD-10-CM

## 2020-05-14 RX ORDER — FLUOXETINE HYDROCHLORIDE 40 MG/1
40 CAPSULE ORAL DAILY
Qty: 90 CAPSULE | Refills: 0 | Status: CANCELLED | OUTPATIENT
Start: 2020-05-14

## 2020-09-02 ENCOUNTER — HOSPITAL ENCOUNTER (OUTPATIENT)
Age: 40
Discharge: HOME | End: 2020-09-02
Payer: COMMERCIAL

## 2020-09-02 VITALS
HEART RATE: 64 BPM | RESPIRATION RATE: 16 BRPM | DIASTOLIC BLOOD PRESSURE: 93 MMHG | SYSTOLIC BLOOD PRESSURE: 123 MMHG | TEMPERATURE: 98 F | OXYGEN SATURATION: 95 %

## 2020-09-02 DIAGNOSIS — S61.012A THUMB LACERATION, LEFT, INITIAL ENCOUNTER: Primary | ICD-10-CM

## 2020-09-02 PROCEDURE — 99212 OFFICE O/P EST SF 10 MIN: CPT | Performed by: PHYSICIAN ASSISTANT

## 2020-09-02 PROCEDURE — 12001 RPR S/N/AX/GEN/TRNK 2.5CM/<: CPT | Performed by: PHYSICIAN ASSISTANT

## 2020-09-02 PROCEDURE — 99213 OFFICE O/P EST LOW 20 MIN: CPT | Performed by: PHYSICIAN ASSISTANT

## 2020-09-02 NOTE — ED PROVIDER NOTES
Patient Seen in: THE [facility] OF [facility] [place] [employer]      History   Patient presents with:  Laceration Abrasion    Stated Complaint: laceration to left hand thumb    HPI    47-year-old female here with complaint of a laceration to her left hand [facility] note reviewed. Constitutional:       Appearance: She is well-developed. HENT:      Head: Normocephalic.       Right Ear: Tympanic membrane and external ear normal.      Left Ear: Tympanic membrane and external ear normal.      Nose: Nose normal.      Mo discussed the plan of care with the patient, who expresses understanding. All questions and concerns are addressed to the patients satisfaction prior to discharge today.                  Disposition and Plan     Clinical Impression:  Thumb laceration, left,

## 2020-09-03 ENCOUNTER — OFFICE VISIT (OUTPATIENT)
Dept: INTERNAL MEDICINE CLINIC | Facility: CLINIC | Age: 40
End: 2020-09-03
Payer: COMMERCIAL

## 2020-09-03 VITALS
RESPIRATION RATE: 14 BRPM | SYSTOLIC BLOOD PRESSURE: 124 MMHG | HEIGHT: 64.5 IN | DIASTOLIC BLOOD PRESSURE: 80 MMHG | WEIGHT: 216 LBS | HEART RATE: 68 BPM | TEMPERATURE: 97 F | BODY MASS INDEX: 36.43 KG/M2

## 2020-09-03 DIAGNOSIS — Z51.81 ENCOUNTER FOR THERAPEUTIC DRUG MONITORING: Primary | ICD-10-CM

## 2020-09-03 DIAGNOSIS — E66.9 OBESITY (BMI 30-39.9): ICD-10-CM

## 2020-09-03 DIAGNOSIS — F43.9 STRESS: ICD-10-CM

## 2020-09-03 PROCEDURE — 3008F BODY MASS INDEX DOCD: CPT | Performed by: NURSE PRACTITIONER

## 2020-09-03 PROCEDURE — 3074F SYST BP LT 130 MM HG: CPT | Performed by: NURSE PRACTITIONER

## 2020-09-03 PROCEDURE — 99213 OFFICE O/P EST LOW 20 MIN: CPT | Performed by: NURSE PRACTITIONER

## 2020-09-03 PROCEDURE — 3079F DIAST BP 80-89 MM HG: CPT | Performed by: NURSE PRACTITIONER

## 2020-09-03 RX ORDER — PHENTERMINE HYDROCHLORIDE 15 MG/1
15 CAPSULE ORAL EVERY MORNING
Qty: 30 CAPSULE | Refills: 0 | Status: SHIPPED | OUTPATIENT
Start: 2020-09-03 | End: 2020-10-06

## 2020-09-03 RX ORDER — FLUOXETINE HYDROCHLORIDE 40 MG/1
40 CAPSULE ORAL DAILY
Qty: 90 CAPSULE | Refills: 1 | Status: SHIPPED | OUTPATIENT
Start: 2020-09-03 | End: 2021-03-09

## 2020-09-03 RX ORDER — METFORMIN HYDROCHLORIDE 750 MG/1
1500 TABLET, EXTENDED RELEASE ORAL
Qty: 180 TABLET | Refills: 1 | Status: SHIPPED | OUTPATIENT
Start: 2020-09-03 | End: 2021-03-09

## 2020-09-03 NOTE — PROGRESS NOTES
April Ibarra is a 41year old female presents today for 8 month follow-up on medical weight loss program for the treatment of overweight, obesity, or morbid obesity.     S:  Current weight Wt Readings from Last 6 Encounters:  09/03/20 : 216 lb (98 kg) motor and sensory grossly intact  PSYCH: pleasant, cooperative, normal mood and affect    ASSESSMENT AND PLAN:  Encounter for therapeutic drug monitoring  (primary encounter diagnosis)  Obesity (bmi 30-39. 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  Unfortunately, we are stuck with a neuroendocrine system that didn’t get the update, so your brain is still going to tell you to reach fo more than 2 hours a day to deal with stress. Being a couch potato also increases the temptation to overeat and is inactive, which means that those extra calories aren’t getting burned off.  Anxiety can also make you eat more “mindlessly” as you churn around that worry is a major cause of insomnia. Our minds are overactive and won’t switch off. We may also lose sleep because of pulling overnights to cram for exams or writing until the early hours. Stress causes decreased blood sugar, which leads to fatigue.  If friends and family can help to relieve stress without adding on the pounds.  Although you may feel that you don’t have time for leisure activities with looming deadlines, taking time to relieve stress helps you to feel refreshed, lets you think more clearly

## 2020-09-03 NOTE — PATIENT INSTRUCTIONS
Continue making lifestyle changes that focus on good nutrition, regular exercise and stress management.     Medication Plan: Continue current medication regimen, resume fluoxetine and start phentermine daily in the AM.    Next steps to work on before next o In the short-term, adrenaline helps you feel less hungry as your blood flows away from the internal organs and to your large muscles to prepare for “fight or flight.” However, once the effects of adrenaline wear off, cortisol, known as the “stress hormone, Adrenaline is the reason for the “wired up” feeling we get when we’re stressed.  While we may burn off some extra calories fidgeting or running around cleaning because we can’t sit still, anxiety can also trigger “emotional eating.” Overeating or eating [address] hungrier when we get home later.  15 TATIANA Rudd research study showed, in laboratory mice, that being “stressed” by exposure to the smell of a predator lead the mice to eat more high-fat food pellets, when given the choice of eating these ins in to your sensory experience of the food, including its sight, texture or smell. You also learn to tune into your subjective feelings of hunger or fullness, rather than eating just because it’s a mealtime or because there is food in front of you.  A well-d

## 2020-09-12 ENCOUNTER — HOSPITAL ENCOUNTER (OUTPATIENT)
Age: 40
Discharge: HOME | End: 2020-09-12
Payer: COMMERCIAL

## 2020-09-12 VITALS
RESPIRATION RATE: 20 BRPM | SYSTOLIC BLOOD PRESSURE: 122 MMHG | DIASTOLIC BLOOD PRESSURE: 82 MMHG | OXYGEN SATURATION: 100 % | TEMPERATURE: 99 F | HEART RATE: 72 BPM

## 2020-09-12 DIAGNOSIS — Z48.02 ENCOUNTER FOR REMOVAL OF SUTURES: Primary | ICD-10-CM

## 2020-09-12 NOTE — ED PROVIDER NOTES
Patient Seen in: THE [facility] [place] [facility]      History   Patient presents with:  Suture Removal    Stated Complaint: SUTURE REMOVAL    HPI    68-year-old female presents for suture removal.  Sutures were placed 10 days ago at our immediate ca Mouth/Throat:      Mouth: Mucous membranes are moist.   Eyes:      Conjunctiva/sclera: Conjunctivae normal.   Neck:      Musculoskeletal: Neck supple. Cardiovascular:      Rate and Rhythm: Normal rate and regular rhythm. Pulses: Normal pulses.

## 2020-10-05 DIAGNOSIS — E66.9 OBESITY (BMI 30-39.9): ICD-10-CM

## 2020-10-05 DIAGNOSIS — Z51.81 ENCOUNTER FOR THERAPEUTIC DRUG MONITORING: ICD-10-CM

## 2020-10-06 ENCOUNTER — OFFICE VISIT (OUTPATIENT)
Dept: INTERNAL MEDICINE CLINIC | Facility: CLINIC | Age: 40
End: 2020-10-06
Payer: COMMERCIAL

## 2020-10-06 VITALS
BODY MASS INDEX: 35.75 KG/M2 | WEIGHT: 212 LBS | DIASTOLIC BLOOD PRESSURE: 80 MMHG | HEIGHT: 64.5 IN | TEMPERATURE: 98 F | SYSTOLIC BLOOD PRESSURE: 120 MMHG | RESPIRATION RATE: 16 BRPM | HEART RATE: 78 BPM

## 2020-10-06 DIAGNOSIS — E66.9 OBESITY (BMI 30-39.9): ICD-10-CM

## 2020-10-06 DIAGNOSIS — Z51.81 ENCOUNTER FOR THERAPEUTIC DRUG MONITORING: Primary | ICD-10-CM

## 2020-10-06 PROCEDURE — 3079F DIAST BP 80-89 MM HG: CPT | Performed by: NURSE PRACTITIONER

## 2020-10-06 PROCEDURE — 3008F BODY MASS INDEX DOCD: CPT | Performed by: NURSE PRACTITIONER

## 2020-10-06 PROCEDURE — 3074F SYST BP LT 130 MM HG: CPT | Performed by: NURSE PRACTITIONER

## 2020-10-06 PROCEDURE — 99213 OFFICE O/P EST LOW 20 MIN: CPT | Performed by: NURSE PRACTITIONER

## 2020-10-06 RX ORDER — PHENTERMINE HYDROCHLORIDE 15 MG/1
15 CAPSULE ORAL EVERY MORNING
Qty: 30 CAPSULE | Refills: 0 | Status: SHIPPED | OUTPATIENT
Start: 2020-10-06 | End: 2020-11-10

## 2020-10-06 NOTE — PROGRESS NOTES
Юлия Flynn is a 41year old female presents today for 9 month follow-up on medical weight loss program for the treatment of overweight, obesity, or morbid obesity.     S:  Current weight Wt Readings from Last 6 Encounters:  10/06/20 : 212 lb (96.2 kg (96.2 kg)   LMP 09/22/2020   Breastfeeding No   BMI 35.83 kg/m²   GENERAL: well developed, well nourished, in no apparent distress, obese  EYES: conjunctiva pink, sclera non icteric, PERRLA  LUNGS: CTA in all fields, breathing non labored  CARDIO: RRR with help provide daily encouragement. Take pictures of your food to help remain accountable. Constipation  Constipation is a common and often uncomfortable problem.  Constipation means you have bowel movements fewer than 3 times per week, or strain to pass h

## 2020-10-06 NOTE — PATIENT INSTRUCTIONS
Continue making lifestyle changes that focus on good nutrition, regular exercise and stress management. Medication Plan: Continue current medication regimen. Add over the counter Miralax daily.     Next steps to work on before next office visit include: This information is not intended as a substitute for professional medical care. Always follow your healthcare professional's instructions.

## 2020-10-06 NOTE — TELEPHONE ENCOUNTER
Requesting Phentermine 15 mg  LOV: 9/3/20  RTC: one month  Last Relevant Labs: na  Filled: 9/3/20 #30 with 0 refills    Future Appointments   Date Time Provider Carol Wood   10/6/2020  8:20 AM STEPHANIE Woodard [facility]   10/20/2020

## 2020-10-09 RX ORDER — PHENTERMINE HYDROCHLORIDE 15 MG/1
15 CAPSULE ORAL EVERY MORNING
Qty: 30 CAPSULE | Refills: 0 | OUTPATIENT
Start: 2020-10-09

## 2020-10-09 NOTE — TELEPHONE ENCOUNTER
Rx was refilled on 10/6/20 after visit to pharmacy who sent this request before the visit happened.  Denied with note as such

## 2020-10-20 ENCOUNTER — LAB ENCOUNTER (OUTPATIENT)
Dept: LAB | Age: 40
End: 2020-10-20
Attending: FAMILY MEDICINE
Payer: COMMERCIAL

## 2020-10-20 ENCOUNTER — OFFICE VISIT (OUTPATIENT)
Dept: FAMILY MEDICINE CLINIC | Facility: CLINIC | Age: 40
End: 2020-10-20
Payer: COMMERCIAL

## 2020-10-20 VITALS
HEIGHT: 64.5 IN | BODY MASS INDEX: 35.25 KG/M2 | SYSTOLIC BLOOD PRESSURE: 130 MMHG | DIASTOLIC BLOOD PRESSURE: 80 MMHG | TEMPERATURE: 97 F | WEIGHT: 209 LBS | HEART RATE: 84 BPM | RESPIRATION RATE: 14 BRPM

## 2020-10-20 DIAGNOSIS — Z11.3 SCREENING FOR STD (SEXUALLY TRANSMITTED DISEASE): ICD-10-CM

## 2020-10-20 DIAGNOSIS — E55.9 VITAMIN D DEFICIENCY: ICD-10-CM

## 2020-10-20 DIAGNOSIS — Z12.31 ENCOUNTER FOR SCREENING MAMMOGRAM FOR BREAST CANCER: ICD-10-CM

## 2020-10-20 DIAGNOSIS — Z00.00 ROUTINE GENERAL MEDICAL EXAMINATION AT A HEALTH CARE FACILITY: Primary | ICD-10-CM

## 2020-10-20 DIAGNOSIS — Z13.89 SCREENING FOR GENITOURINARY CONDITION: ICD-10-CM

## 2020-10-20 DIAGNOSIS — Z00.00 LABORATORY EXAMINATION ORDERED AS PART OF A ROUTINE GENERAL MEDICAL EXAMINATION: ICD-10-CM

## 2020-10-20 DIAGNOSIS — Z12.4 SCREENING FOR CERVICAL CANCER: ICD-10-CM

## 2020-10-20 PROCEDURE — 87591 N.GONORRHOEAE DNA AMP PROB: CPT | Performed by: FAMILY MEDICINE

## 2020-10-20 PROCEDURE — 36415 COLL VENOUS BLD VENIPUNCTURE: CPT | Performed by: FAMILY MEDICINE

## 2020-10-20 PROCEDURE — 87491 CHLMYD TRACH DNA AMP PROBE: CPT | Performed by: FAMILY MEDICINE

## 2020-10-20 PROCEDURE — 3008F BODY MASS INDEX DOCD: CPT | Performed by: FAMILY MEDICINE

## 2020-10-20 PROCEDURE — 87624 HPV HI-RISK TYP POOLED RSLT: CPT | Performed by: FAMILY MEDICINE

## 2020-10-20 PROCEDURE — 81003 URINALYSIS AUTO W/O SCOPE: CPT | Performed by: FAMILY MEDICINE

## 2020-10-20 PROCEDURE — 3075F SYST BP GE 130 - 139MM HG: CPT | Performed by: FAMILY MEDICINE

## 2020-10-20 PROCEDURE — 80050 GENERAL HEALTH PANEL: CPT | Performed by: FAMILY MEDICINE

## 2020-10-20 PROCEDURE — 99396 PREV VISIT EST AGE 40-64: CPT | Performed by: FAMILY MEDICINE

## 2020-10-20 PROCEDURE — 87389 HIV-1 AG W/HIV-1&-2 AB AG IA: CPT | Performed by: FAMILY MEDICINE

## 2020-10-20 PROCEDURE — 80061 LIPID PANEL: CPT | Performed by: FAMILY MEDICINE

## 2020-10-20 PROCEDURE — 3079F DIAST BP 80-89 MM HG: CPT | Performed by: FAMILY MEDICINE

## 2020-10-20 PROCEDURE — 82306 VITAMIN D 25 HYDROXY: CPT | Performed by: FAMILY MEDICINE

## 2020-10-20 NOTE — H&P
CC: Annual Physical Exam    HPI:   Eveline Palencia is a 41year old female who presents for a complete physical exam. Symptoms: periods are regular. Patient complains of nothing.         Wt Readings from Last 6 Encounters:  10/20/20 : 209 lb (94.8 kg)  10 ibuprofen 200 MG Oral Tab Take 200 mg by mouth every 6 (six) hours as needed for Pain. • TURMERIC CURCUMIN OR Take by mouth. • Probiotic Product (VSL#3 DS OR) Take by mouth. • MULTIVITAMIN TAB/CAP Take 1 tablet by mouth daily.      • Cholecalcif dysuria, vaginal discharge or itching,periods irregular -- currently on period  MUSCULOSKELETAL: denies back pain  NEURO: denies headaches  PSYCHE: denies depression or anxiety  HEMATOLOGIC: denies hx of anemia  ENDOCRINE: denies thyroid history  ALL/ASTHM condition  Encounter for screening mammogram for breast cancer  Screening for std (sexually transmitted disease)    Orders Placed This Encounter      Vitamin D, 25-Hydroxy [E]      CBC W Differential W Platelet [E]      Lipid Panel      TSH W Reflex To Abilio

## 2020-10-21 DIAGNOSIS — E55.9 VITAMIN D DEFICIENCY: Primary | ICD-10-CM

## 2020-10-21 DIAGNOSIS — E87.8 ELECTROLYTE ABNORMALITY: ICD-10-CM

## 2020-10-21 RX ORDER — MULTIVIT-MIN/IRON/FOLIC ACID/K 18-600-40
2000 CAPSULE ORAL DAILY
Qty: 30 CAPSULE | Refills: 0 | COMMUNITY
Start: 2020-10-21

## 2020-11-09 NOTE — PROGRESS NOTES
Darryl Parry is a 41year old female presents today for 10 month follow-up on medical weight loss program for the treatment of overweight, obesity, or morbid obesity.     S:  Current weight Wt Readings from Last 6 Encounters:  11/10/20 : 208 lb (94.3 k PERRLA  LUNGS: CTA in all fields, breathing non labored  CARDIO: RRR without murmur, normal S1 and S2 without clicks or gallops, no pedal edema.   GI: +BS  NEURO/MS: motor and sensory grossly intact  PSYCH: pleasant, cooperative, normal mood and affect    A [email or web address] Full fitness center with group fitness and personal training. Discount available as client of [facility].   · Health Coaching and Personal Training with Rashad Palacios at [facility] Deshawn Vigil M.D. · The Complete Guide to fasting by Dr. Johnathon Mojica  · Sugar, Francisca Read by Kyle Simpson, Ph.D, R.D.   · The [employer]- [employer] Documentary on plant based nutrition  · Fed Up - documentary on sugar in our culture (Free on [facility])  · The Dr. Vincenzo Schilling

## 2020-11-10 ENCOUNTER — OFFICE VISIT (OUTPATIENT)
Dept: INTERNAL MEDICINE CLINIC | Facility: CLINIC | Age: 40
End: 2020-11-10
Payer: COMMERCIAL

## 2020-11-10 VITALS
HEART RATE: 80 BPM | WEIGHT: 208 LBS | HEIGHT: 64.5 IN | DIASTOLIC BLOOD PRESSURE: 80 MMHG | BODY MASS INDEX: 35.08 KG/M2 | SYSTOLIC BLOOD PRESSURE: 120 MMHG | RESPIRATION RATE: 14 BRPM

## 2020-11-10 DIAGNOSIS — E66.9 OBESITY (BMI 30-39.9): ICD-10-CM

## 2020-11-10 DIAGNOSIS — Z51.81 ENCOUNTER FOR THERAPEUTIC DRUG MONITORING: Primary | ICD-10-CM

## 2020-11-10 PROCEDURE — 99214 OFFICE O/P EST MOD 30 MIN: CPT | Performed by: NURSE PRACTITIONER

## 2020-11-10 PROCEDURE — 3008F BODY MASS INDEX DOCD: CPT | Performed by: NURSE PRACTITIONER

## 2020-11-10 PROCEDURE — 3079F DIAST BP 80-89 MM HG: CPT | Performed by: NURSE PRACTITIONER

## 2020-11-10 PROCEDURE — 3074F SYST BP LT 130 MM HG: CPT | Performed by: NURSE PRACTITIONER

## 2020-11-10 PROCEDURE — 99072 ADDL SUPL MATRL&STAF TM PHE: CPT | Performed by: NURSE PRACTITIONER

## 2020-11-10 RX ORDER — PHENTERMINE HYDROCHLORIDE 15 MG/1
15 CAPSULE ORAL EVERY MORNING
Qty: 30 CAPSULE | Refills: 1 | Status: SHIPPED | OUTPATIENT
Start: 2020-11-10 | End: 2021-01-12 | Stop reason: DRUGHIGH

## 2020-11-10 NOTE — PATIENT INSTRUCTIONS
Continue making lifestyle changes that focus on good nutrition, regular exercise and stress management. Medication Plan: Continue current medication regimen. Add over the counter Miralax daily. Report if constipation not improved after 2 weeks.  Can cons [email or web address] com  · [employer], [employer], Home  - on line meal delivery programs  · Meal Village in [place] for homemade meals to go @ [email or web address]  · Diet Doctor @ www. dietdoctor. com - low carb swaps    Stress Management/Behavior/Mindful Ea

## 2020-11-25 ENCOUNTER — HOSPITAL ENCOUNTER (OUTPATIENT)
Age: 40
Discharge: HOME | End: 2020-11-25
Payer: COMMERCIAL

## 2020-11-25 VITALS
HEART RATE: 77 BPM | BODY MASS INDEX: 34.32 KG/M2 | DIASTOLIC BLOOD PRESSURE: 84 MMHG | HEIGHT: 65 IN | OXYGEN SATURATION: 99 % | WEIGHT: 206 LBS | TEMPERATURE: 97 F | RESPIRATION RATE: 16 BRPM | SYSTOLIC BLOOD PRESSURE: 126 MMHG

## 2020-11-25 DIAGNOSIS — H65.01 NON-RECURRENT ACUTE SEROUS OTITIS MEDIA OF RIGHT EAR: Primary | ICD-10-CM

## 2020-11-25 PROCEDURE — 99213 OFFICE O/P EST LOW 20 MIN: CPT

## 2020-11-25 PROCEDURE — 99214 OFFICE O/P EST MOD 30 MIN: CPT

## 2020-11-25 RX ORDER — FLUTICASONE PROPIONATE 50 MCG
2 SPRAY, SUSPENSION (ML) NASAL DAILY
Qty: 16 G | Refills: 0 | Status: SHIPPED | OUTPATIENT
Start: 2020-11-25 | End: 2020-12-25

## 2020-11-25 NOTE — ED INITIAL ASSESSMENT (HPI)
The patient states the last week and a half she has had intermittent right ear throbbing that has progressively gotten worse. She states she is now having discomfort also to the left ear. She hasn't taken anything for it and denies any other symptoms.

## 2020-11-25 NOTE — ED PROVIDER NOTES
Patient Seen in: [facility]      History   Patient presents with:  Ear Problem Pain    Stated Complaint: EAR INFECTION    HPI    Priyanka Braga is a 39-year-old female who presents today for evaluation of ear pain.   She has a past medical histor Exam  Nursing note reviewed. Vital signs reviewed.   General: A&O x 4; well-developed, well-nourished, non-toxic, no acute distress  Head: Normocephalic, Atraumatic  Eyes: PERRLA, EOMI, no periorbital edema/erythema, no conjunctivitis, scleral injection, dr MCG/ACT Nasal Suspension  2 sprays by Nasal route daily. , Normal, Disp-16 g, R-0

## 2020-12-01 ENCOUNTER — HOSPITAL ENCOUNTER (OUTPATIENT)
Dept: MAMMOGRAPHY | Age: 40
Discharge: HOME | End: 2020-12-01
Attending: FAMILY MEDICINE
Payer: COMMERCIAL

## 2020-12-01 DIAGNOSIS — Z12.31 ENCOUNTER FOR SCREENING MAMMOGRAM FOR BREAST CANCER: ICD-10-CM

## 2020-12-01 PROCEDURE — 77063 BREAST TOMOSYNTHESIS BI: CPT | Performed by: FAMILY MEDICINE

## 2020-12-01 PROCEDURE — 77067 SCR MAMMO BI INCL CAD: CPT | Performed by: FAMILY MEDICINE

## 2020-12-07 ENCOUNTER — HOSPITAL ENCOUNTER (OUTPATIENT)
Dept: CT IMAGING | Facility: HOSPITAL | Age: 40
Discharge: HOME | End: 2020-12-07
Attending: FAMILY MEDICINE

## 2020-12-07 DIAGNOSIS — Z13.6 SCREENING FOR CARDIOVASCULAR CONDITION: ICD-10-CM

## 2020-12-16 ENCOUNTER — TELEPHONE (OUTPATIENT)
Dept: FAMILY MEDICINE CLINIC | Facility: CLINIC | Age: 40
End: 2020-12-16

## 2020-12-16 RX ORDER — FLUCONAZOLE 150 MG/1
150 TABLET ORAL ONCE
Qty: 2 TABLET | Refills: 0 | Status: SHIPPED | OUTPATIENT
Start: 2020-12-16 | End: 2020-12-16

## 2020-12-16 NOTE — TELEPHONE ENCOUNTER
Pt said in the past two days, she is having symptoms of yeast infection and requested refill of   fluconazole (DIFLUCAN) 150 MG Oral Tab () 2 tablet     To be sent to:[id number] - [address], [address] [place] [address] Rufus Becerril [phone], [phone].  Thank y

## 2020-12-17 ENCOUNTER — IMMUNIZATION (OUTPATIENT)
Dept: LAB | Age: 40
End: 2020-12-17

## 2020-12-17 DIAGNOSIS — Z23 NEED FOR VACCINATION: Primary | ICD-10-CM

## 2020-12-17 PROCEDURE — 0001A COVID 19 PFIZER-BIONTECH: CPT

## 2020-12-17 PROCEDURE — 91300 COVID 19 PFIZER-BIONTECH: CPT

## 2021-01-07 ENCOUNTER — IMMUNIZATION (OUTPATIENT)
Dept: LAB | Age: 41
End: 2021-01-07

## 2021-01-07 DIAGNOSIS — Z23 NEED FOR VACCINATION: Primary | ICD-10-CM

## 2021-01-07 PROCEDURE — 0002A COVID 19 PFIZER-BIONTECH: CPT | Performed by: NURSE PRACTITIONER

## 2021-01-07 PROCEDURE — 91300 COVID 19 PFIZER-BIONTECH: CPT | Performed by: NURSE PRACTITIONER

## 2021-01-11 NOTE — PROGRESS NOTES
Julieth Arnold is a 41year old female presents today for 1 year follow-up on medical weight loss program for the treatment of overweight, obesity, or morbid obesity.     S:  Current weight Wt Readings from Last 6 Encounters:  01/12/21 : 205 lb (93 k in no apparent distress, obese  EYES: conjunctiva pink, sclera non icteric, PERRLA  LUNGS: CTA in all fields, breathing non labored  CARDIO: RRR without murmur, normal S1 and S2 without clicks or gallops, no pedal edema.   GI: +BS  NEURO/MS: motor and senso alternative option like Linzess or Trulance. Eliminate self take and be mindful of the impact aging has on weight. Make it a routine of reading the daily blog from www.yourweightmatters. org for motivation, exercise and nutrition tips relevant to today's cu up, the more likely you are to lift yourself up along the way, too.  3 – Start a [place]. Every day, write down positive things you recognize about yourself that you are grateful for.  For example:  • “I am hard-working and don't give u gain in perimenopausal women was about five pounds; however, 20% of the population they studied gained 10 pounds or more. Not only is the weight increase from a drop in estrogen but it’s also because of a decrease in energy expenditure.  Some women may noti fat.    Make weight gain a modifiable risk factor  So, you may be thinking—I’m destined for failure! But this isn’t true. Although the risk of weight gain as a middle-aged woman is higher, this does not mean that it is required.  It does mean that we may ha get enough sleep in order to keep your hormones and appetite under control. Get support and learn to cope without food. Many women (and men) admit to eating under stress. And, let’s face it, middle age can bring some tough times.  Children are often depar

## 2021-01-12 ENCOUNTER — OFFICE VISIT (OUTPATIENT)
Dept: INTERNAL MEDICINE CLINIC | Facility: CLINIC | Age: 41
End: 2021-01-12
Payer: COMMERCIAL

## 2021-01-12 VITALS
RESPIRATION RATE: 16 BRPM | WEIGHT: 205 LBS | DIASTOLIC BLOOD PRESSURE: 80 MMHG | BODY MASS INDEX: 34.57 KG/M2 | HEIGHT: 64.5 IN | SYSTOLIC BLOOD PRESSURE: 120 MMHG | HEART RATE: 70 BPM

## 2021-01-12 DIAGNOSIS — E66.9 OBESITY (BMI 30-39.9): ICD-10-CM

## 2021-01-12 DIAGNOSIS — Z51.81 ENCOUNTER FOR THERAPEUTIC DRUG MONITORING: Primary | ICD-10-CM

## 2021-01-12 DIAGNOSIS — K59.04 CHRONIC IDIOPATHIC CONSTIPATION: ICD-10-CM

## 2021-01-12 PROCEDURE — 3074F SYST BP LT 130 MM HG: CPT | Performed by: NURSE PRACTITIONER

## 2021-01-12 PROCEDURE — 99213 OFFICE O/P EST LOW 20 MIN: CPT | Performed by: NURSE PRACTITIONER

## 2021-01-12 PROCEDURE — 3079F DIAST BP 80-89 MM HG: CPT | Performed by: NURSE PRACTITIONER

## 2021-01-12 PROCEDURE — 3008F BODY MASS INDEX DOCD: CPT | Performed by: NURSE PRACTITIONER

## 2021-01-12 RX ORDER — PHENTERMINE HYDROCHLORIDE 37.5 MG/1
37.5 TABLET ORAL EVERY MORNING
Qty: 30 TABLET | Refills: 1 | Status: SHIPPED | OUTPATIENT
Start: 2021-01-12 | End: 2021-03-09

## 2021-01-12 RX ORDER — PHENTERMINE HYDROCHLORIDE 15 MG/1
15 CAPSULE ORAL EVERY MORNING
Qty: 30 CAPSULE | Refills: 1 | Status: CANCELLED | OUTPATIENT
Start: 2021-01-12

## 2021-01-12 RX ORDER — FLUCONAZOLE 150 MG/1
TABLET ORAL
COMMUNITY
Start: 2020-12-16 | End: 2021-01-12

## 2021-01-12 NOTE — PATIENT INSTRUCTIONS
Continue making lifestyle changes that focus on good nutrition, regular exercise and stress management. Medication Plan: Continue current medication regimen, aside from increase phentermine.      Next steps to work on before next office visit include: Gr your inner critic can be challenging, but you DO have power over your thoughts. Try these tips to rise above them and boost self-confidence. 1 – Imagine Saying to Other People What You Say to Yourself.  It can help you realize the harshness of your inner c make the difference between being stagnant and pushing forward!     Perimenopause/Menopause and Obesity    The prevalence of obesity, which is closely associated with cardiovascular risk, increases significantly in American women after they reach age 36; th be significantly higher in perimenopausal women compared with premenopausal and postmenopausal women.     The low estrogen levels in the late stages of menopause may also impair the function of leptin and neuropeptide Y, hormones that control fullness and a improve body composition, increase strength, and build and maintain lean muscle. The American Heart Association recommends 150 minutes of moderate exercise per week. Add ANY activity to your day. Strength and resistance training help maintain bone mass.  Chris Norton

## 2021-01-16 ENCOUNTER — TELEPHONE (OUTPATIENT)
Dept: FAMILY MEDICINE CLINIC | Facility: CLINIC | Age: 41
End: 2021-01-16

## 2021-01-17 ENCOUNTER — HOSPITAL ENCOUNTER (OUTPATIENT)
Age: 41
Discharge: HOME | End: 2021-01-17
Payer: COMMERCIAL

## 2021-01-17 VITALS
OXYGEN SATURATION: 98 % | DIASTOLIC BLOOD PRESSURE: 80 MMHG | HEART RATE: 84 BPM | SYSTOLIC BLOOD PRESSURE: 131 MMHG | TEMPERATURE: 98 F | RESPIRATION RATE: 20 BRPM

## 2021-01-17 DIAGNOSIS — Z20.822 CLOSE EXPOSURE TO COVID-19 VIRUS: Primary | ICD-10-CM

## 2021-01-17 DIAGNOSIS — Z71.1 WORRIED WELL: ICD-10-CM

## 2021-01-17 LAB — SARS-COV-2 RNA RESP QL NAA+PROBE: NOT DETECTED

## 2021-01-17 PROCEDURE — 99213 OFFICE O/P EST LOW 20 MIN: CPT

## 2021-01-17 PROCEDURE — 99212 OFFICE O/P EST SF 10 MIN: CPT

## 2021-01-17 NOTE — TELEPHONE ENCOUNTER
Pt. Was exposed to mother last week who tested positive to Emily Antunez. Will go to urgent care with her children and get tested.

## 2021-01-17 NOTE — ED PROVIDER NOTES
Patient Seen in: [facility]      History   Patient presents with:  Covid-19 Test    Stated Complaint: COVID TEST/EXPOSURE    HPI/Subjective:   HPI    39-year-old female who comes in today after recent Covid exposure by her mom a 1 week ag respirations unlabored. No audible wheezing, rales or rhonchi. ED Course     Labs Reviewed   RAPID SARS-COV-2 BY PCR - Normal            MDM       I discussed with the patient quarantine instructions, COVID-19 instructions and conservative care.  P I revie

## 2021-01-18 RX ORDER — IBUPROFEN 600 MG/1
600 TABLET ORAL EVERY 8 HOURS PRN
Qty: 60 TABLET | Refills: 0 | OUTPATIENT
Start: 2021-01-18

## 2021-01-28 ENCOUNTER — HOSPITAL ENCOUNTER (OUTPATIENT)
Age: 41
Discharge: HOME | End: 2021-01-28
Payer: COMMERCIAL

## 2021-01-28 VITALS
WEIGHT: 205 LBS | OXYGEN SATURATION: 99 % | HEART RATE: 90 BPM | BODY MASS INDEX: 34.16 KG/M2 | DIASTOLIC BLOOD PRESSURE: 98 MMHG | TEMPERATURE: 98 F | SYSTOLIC BLOOD PRESSURE: 128 MMHG | HEIGHT: 65 IN | RESPIRATION RATE: 16 BRPM

## 2021-01-28 DIAGNOSIS — J30.9 ALLERGIC RHINITIS, UNSPECIFIED SEASONALITY, UNSPECIFIED TRIGGER: ICD-10-CM

## 2021-01-28 DIAGNOSIS — Z20.822 CLOSE EXPOSURE TO 2019 NOVEL CORONAVIRUS: Primary | ICD-10-CM

## 2021-01-28 LAB — SARS-COV-2 RNA RESP QL NAA+PROBE: NOT DETECTED

## 2021-01-28 PROCEDURE — 99212 OFFICE O/P EST SF 10 MIN: CPT | Performed by: NURSE PRACTITIONER

## 2021-01-28 NOTE — ED INITIAL ASSESSMENT (HPI)
Pt presents to the IC with complaints of covid exposure on Sunday. Pt c/o allergy symptoms, runny nose and ear pressure. Pt awake and alert, skin w/d,resps reg/unlabored.

## 2021-01-28 NOTE — ED PROVIDER NOTES
Patient Seen in: [facility]      History   Patient presents with:  Covid-19 Test    Stated Complaint: exposure, congestion     HPI/Subjective:   HPI  15-year-old female with history of allergic rhinitis presents requesting Covid testing. heart sounds. Pulmonary:      Effort: Pulmonary effort is normal.      Breath sounds: Normal breath sounds. Skin:     General: Skin is warm and dry. Neurological:      Mental Status: She is alert and oriented to person, place, and time.    Psychiatric

## 2021-02-02 ENCOUNTER — TELEPHONE (OUTPATIENT)
Dept: INTERNAL MEDICINE CLINIC | Facility: CLINIC | Age: 41
End: 2021-02-02

## 2021-02-02 DIAGNOSIS — E66.9 OBESITY (BMI 30-39.9): Primary | ICD-10-CM

## 2021-02-02 NOTE — TELEPHONE ENCOUNTER
Yolanda Amaya, MIRIAM Warner,     Can you enter a new referral for patient as we have been working off one since last one was done. Insurance may want a more recent updated order,     Thanks.       DONE

## 2021-02-03 ENCOUNTER — TELEMEDICINE (OUTPATIENT)
Dept: INTERNAL MEDICINE CLINIC | Facility: CLINIC | Age: 41
End: 2021-02-03

## 2021-02-03 DIAGNOSIS — E66.9 OBESITY (BMI 30.0-34.9): ICD-10-CM

## 2021-02-03 PROCEDURE — 97803 MED NUTRITION INDIV SUBSEQ: CPT | Performed by: DIETITIAN, REGISTERED

## 2021-02-04 NOTE — PROGRESS NOTES
FOLLOW UP NUTRITION CONSULTATION    This consultation was conducted via real time interactive audio and video on 2/3/21    Nutrition Assessment    Number of consultations with dietitian: 2    Height:  Ht Readings from Last 1 Encounters:  01/28/21 : 5' 5

## 2021-03-08 NOTE — PROGRESS NOTES
Shey Pinon is a 41year old female presents today for follow-up on medical weight loss program for the treatment of overweight, obesity, or morbid obesity.     S:  Current weight Wt Readings from Last 6 Encounters:  03/09/21 : 200 lb (90.7 kg)  0 PERRLA  LUNGS: CTA in all fields, breathing non labored  CARDIO: RRR without murmur, normal S1 and S2 without clicks or gallops, no pedal edema.   GI: +BS  NEURO/MS: motor and sensory grossly intact  PSYCH: pleasant, cooperative, normal mood and affect    A Continue current medication regimen, aside from add Plenity as directed. Order was faxed to pharmacy and they will contact you to set up delivery. Make sure to check your email and phone for communication in the next 24-48 hours. Additionally checkout www. motivating. You've put the pedal to the medal, so reward yourself with something you might really want or need (not food-related) to distract your mind.   Connect with Others on Similar Paths  The structured routine of our daily lives can be quite isolating

## 2021-03-09 ENCOUNTER — OFFICE VISIT (OUTPATIENT)
Dept: INTERNAL MEDICINE CLINIC | Facility: CLINIC | Age: 41
End: 2021-03-09
Payer: COMMERCIAL

## 2021-03-09 ENCOUNTER — TELEPHONE (OUTPATIENT)
Dept: INTERNAL MEDICINE CLINIC | Facility: CLINIC | Age: 41
End: 2021-03-09

## 2021-03-09 VITALS
SYSTOLIC BLOOD PRESSURE: 124 MMHG | HEIGHT: 65 IN | RESPIRATION RATE: 16 BRPM | WEIGHT: 200 LBS | HEART RATE: 86 BPM | BODY MASS INDEX: 33.32 KG/M2 | DIASTOLIC BLOOD PRESSURE: 82 MMHG

## 2021-03-09 DIAGNOSIS — E66.9 OBESITY (BMI 30-39.9): ICD-10-CM

## 2021-03-09 DIAGNOSIS — Z51.81 ENCOUNTER FOR THERAPEUTIC DRUG MONITORING: Primary | ICD-10-CM

## 2021-03-09 DIAGNOSIS — F43.9 STRESS: ICD-10-CM

## 2021-03-09 PROCEDURE — 3074F SYST BP LT 130 MM HG: CPT | Performed by: NURSE PRACTITIONER

## 2021-03-09 PROCEDURE — 3008F BODY MASS INDEX DOCD: CPT | Performed by: NURSE PRACTITIONER

## 2021-03-09 PROCEDURE — 99213 OFFICE O/P EST LOW 20 MIN: CPT | Performed by: NURSE PRACTITIONER

## 2021-03-09 PROCEDURE — 3079F DIAST BP 80-89 MM HG: CPT | Performed by: NURSE PRACTITIONER

## 2021-03-09 RX ORDER — PHENTERMINE HYDROCHLORIDE 37.5 MG/1
37.5 TABLET ORAL EVERY MORNING
Qty: 30 TABLET | Refills: 1 | Status: SHIPPED | OUTPATIENT
Start: 2021-03-09 | End: 2021-04-26

## 2021-03-09 RX ORDER — METFORMIN HYDROCHLORIDE 750 MG/1
1500 TABLET, EXTENDED RELEASE ORAL
Qty: 180 TABLET | Refills: 1 | Status: SHIPPED | OUTPATIENT
Start: 2021-03-09 | End: 2021-07-29

## 2021-03-09 RX ORDER — FLUOXETINE HYDROCHLORIDE 40 MG/1
40 CAPSULE ORAL DAILY
Qty: 90 CAPSULE | Refills: 1 | Status: SHIPPED | OUTPATIENT
Start: 2021-03-09 | End: 2021-07-29

## 2021-03-09 NOTE — TELEPHONE ENCOUNTER
Received request in Epic for PA for phentermine. Approved. Prior authorization approved Case ID: [id number]      Payer:  Mjmaryjo Jin 19    [phone]     [phone]    KTXAWV:[id number];JULIOMMELONIE:TAMRA; Review Type:Prior Auth; Coverage Start

## 2021-04-26 ENCOUNTER — OFFICE VISIT (OUTPATIENT)
Dept: INTERNAL MEDICINE CLINIC | Facility: CLINIC | Age: 41
End: 2021-04-26
Payer: COMMERCIAL

## 2021-04-26 ENCOUNTER — EMPLOYEE HEALTH (OUTPATIENT)
Dept: OTHER | Age: 41
End: 2021-04-26

## 2021-04-26 VITALS
HEIGHT: 64.5 IN | SYSTOLIC BLOOD PRESSURE: 120 MMHG | RESPIRATION RATE: 14 BRPM | BODY MASS INDEX: 34.07 KG/M2 | WEIGHT: 202 LBS | HEART RATE: 78 BPM | DIASTOLIC BLOOD PRESSURE: 80 MMHG

## 2021-04-26 DIAGNOSIS — K59.04 CHRONIC IDIOPATHIC CONSTIPATION: ICD-10-CM

## 2021-04-26 DIAGNOSIS — E66.9 OBESITY (BMI 30-39.9): ICD-10-CM

## 2021-04-26 DIAGNOSIS — Z51.81 ENCOUNTER FOR THERAPEUTIC DRUG MONITORING: Primary | ICD-10-CM

## 2021-04-26 PROCEDURE — 3079F DIAST BP 80-89 MM HG: CPT | Performed by: NURSE PRACTITIONER

## 2021-04-26 PROCEDURE — 3074F SYST BP LT 130 MM HG: CPT | Performed by: NURSE PRACTITIONER

## 2021-04-26 PROCEDURE — 99213 OFFICE O/P EST LOW 20 MIN: CPT | Performed by: NURSE PRACTITIONER

## 2021-04-26 PROCEDURE — 3008F BODY MASS INDEX DOCD: CPT | Performed by: NURSE PRACTITIONER

## 2021-04-26 RX ORDER — PHENTERMINE HYDROCHLORIDE 37.5 MG/1
37.5 TABLET ORAL EVERY MORNING
Qty: 30 TABLET | Refills: 1 | Status: SHIPPED | OUTPATIENT
Start: 2021-04-26 | End: 2021-07-29

## 2021-04-26 RX ORDER — LINACLOTIDE 72 UG/1
1 CAPSULE, GELATIN COATED ORAL DAILY
Qty: 90 CAPSULE | Refills: 1 | Status: SHIPPED | OUTPATIENT
Start: 2021-04-26 | End: 2021-07-29

## 2021-04-26 RX ORDER — LINACLOTIDE 72 UG/1
1 CAPSULE, GELATIN COATED ORAL DAILY
Qty: 8 CAPSULE | Refills: 0 | COMMUNITY
Start: 2021-04-26 | End: 2021-04-30

## 2021-04-26 NOTE — PROGRESS NOTES
Will Castaneda is a 35year old female presents today for follow-up on medical weight loss program for the treatment of overweight, obesity, or morbid obesity.     S:  Current weight Wt Readings from Last 6 Encounters:  04/26/21 : 202 lb (91.6 kg)  0 non icteric, PERRLA  LUNGS: CTA in all fields, breathing non labored  CARDIO: RRR without murmur, normal S1 and S2 without clicks or gallops, no pedal edema.   GI: +BS  NEURO/MS: motor and sensory grossly intact  PSYCH: pleasant, cooperative, normal mood an capsule 20 minutes before lunch and 2 capsules 20 minutes before dinner to help reduce constipation and bloat feeling. Add Linzess- sample start for chronic constipation. Send Tracsis status report in 2 weeks.     Next steps to work on before next office vi monitor progress. Weighing yourself on a regular basis can provide this support. However, your weight on a scale is not the only way to monitor progress and success. You can also look at non-scale tools, or what I like to call non-scale successes.  For exam

## 2021-04-26 NOTE — PATIENT INSTRUCTIONS
Continue making lifestyle changes that focus on good nutrition, regular exercise and stress management.     Medication Plan: Continue current medication regimen, aside from retry Plenity at 1 capsule 20 minutes before lunch and 2 capsules 20 minutes before portion sizes, stop eating before you feel full, and ultimately eliminate extra calories you do not need. 6. Weigh yourself – It is important to keep yourself accountable and have ways to monitor progress.  Weighing yourself on a regular basis can provid

## 2021-04-27 ENCOUNTER — TELEPHONE (OUTPATIENT)
Dept: INTERNAL MEDICINE CLINIC | Facility: CLINIC | Age: 41
End: 2021-04-27

## 2021-04-27 NOTE — TELEPHONE ENCOUNTER
Faxed request [facility] for flu[facility]. 6 month was sent to local [facility] on 3/9/21 (#90 with 1 refill)    My chart to patient to see if she needs to change pharmacy?

## 2021-04-28 ENCOUNTER — PATIENT MESSAGE (OUTPATIENT)
Dept: INTERNAL MEDICINE CLINIC | Facility: CLINIC | Age: 41
End: 2021-04-28

## 2021-04-29 ENCOUNTER — PATIENT MESSAGE (OUTPATIENT)
Dept: INTERNAL MEDICINE CLINIC | Facility: CLINIC | Age: 41
End: 2021-04-29

## 2021-04-29 NOTE — TELEPHONE ENCOUNTER
From: Will Castaneda  To: Guille Gupta RD  Sent: 4/29/2021 7:36 AM CDT  Subject: Visit Follow-up Question    Hi there. My 1st grader is on quarantine. He was exposed at school. Can we have a video visit this morning instead of an office visit?

## 2021-04-29 NOTE — TELEPHONE ENCOUNTER
Future Appointments   Date Time Provider Carol [place]   4/29/2021  8:00 AM Nasima Collier RD [facility]   6/29/2021  8:00 AM STEPHANIE Corrales [facility]     Verified with Tracy Cruz okay to change to [facility].   Patient informed and will cli

## 2021-05-06 ENCOUNTER — OFFICE VISIT (OUTPATIENT)
Dept: INTERNAL MEDICINE CLINIC | Facility: CLINIC | Age: 41
End: 2021-05-06
Payer: COMMERCIAL

## 2021-05-06 DIAGNOSIS — E66.9 OBESITY (BMI 30-39.9): ICD-10-CM

## 2021-05-06 PROCEDURE — 97803 MED NUTRITION INDIV SUBSEQ: CPT | Performed by: DIETITIAN, REGISTERED

## 2021-05-10 VITALS — WEIGHT: 200 LBS | BODY MASS INDEX: 34 KG/M2

## 2021-05-10 NOTE — PROGRESS NOTES
FOLLOW UP NUTRITION CONSULTATION    Nutrition Assessment    Number of consultations with dietitian: 3    Height:  Ht Readings from Last 1 Encounters:  04/26/21 : 5' 4.5\" (1.638 m)      Weight:   Wt Readings from Last 2 Encounters:  05/10/21 : 200 lb (9

## 2021-06-18 ENCOUNTER — TELEPHONE (OUTPATIENT)
Dept: INTERNAL MEDICINE CLINIC | Facility: CLINIC | Age: 41
End: 2021-06-18

## 2021-06-18 NOTE — TELEPHONE ENCOUNTER
PA requested to continue taking Phentermine 37.5 mg from [place] on [facility]  Awaiting decision  Enoc Davies ([place]: [id number])

## 2021-06-22 NOTE — TELEPHONE ENCOUNTER
Makenna Montelongo Key: [id number] – PA Case ID: [id number] – Rx #: [id number] help? Call us at [phone]  Outcome  Approvedon June 18  CaseId:[id number];Status:Approved; Review Type:Prior Auth; Coverage Start Date:05/19/2021; Coverage End Date:06/18/2022;  Drug

## 2021-07-12 ENCOUNTER — PATIENT MESSAGE (OUTPATIENT)
Dept: INTERNAL MEDICINE CLINIC | Facility: CLINIC | Age: 41
End: 2021-07-12

## 2021-07-13 NOTE — TELEPHONE ENCOUNTER
From: Manda Carrizales  To: STEPHANIE Sutherland  Sent: 7/12/2021 5:26 PM CDT  Subject: Visit Follow-up Question    Hi there, I have been having trouble with the Plenity prescription.  I initially started taking it a couple weeks ago and I had really

## 2021-07-14 NOTE — TELEPHONE ENCOUNTER
Have her reduce the Plenity to 1 capsule (which would be 1/3 of the pod since each pod has 3 capsules) twice a day 30 minutes before lunch and dinner. See how this feels for 1-2 weeks. If not constipated and not as bloated can increase to 2 capsules BID.  I

## 2021-07-21 NOTE — TELEPHONE ENCOUNTER
Patient did not read my chart with recommendations. I called her and left detailed message today and asked her to call back with any questions.

## 2021-07-26 ENCOUNTER — HOSPITAL ENCOUNTER (OUTPATIENT)
Age: 41
Discharge: HOME | End: 2021-07-26
Payer: COMMERCIAL

## 2021-07-26 VITALS
BODY MASS INDEX: 33.66 KG/M2 | HEIGHT: 65 IN | HEART RATE: 81 BPM | WEIGHT: 202 LBS | RESPIRATION RATE: 16 BRPM | OXYGEN SATURATION: 99 % | SYSTOLIC BLOOD PRESSURE: 124 MMHG | DIASTOLIC BLOOD PRESSURE: 79 MMHG | TEMPERATURE: 98 F

## 2021-07-26 DIAGNOSIS — J01.40 ACUTE PANSINUSITIS, RECURRENCE NOT SPECIFIED: Primary | ICD-10-CM

## 2021-07-26 DIAGNOSIS — R09.82 PND (POST-NASAL DRIP): ICD-10-CM

## 2021-07-26 DIAGNOSIS — H65.193 ACUTE EFFUSION OF BOTH MIDDLE EARS: ICD-10-CM

## 2021-07-26 LAB — SARS-COV-2 RNA RESP QL NAA+PROBE: NOT DETECTED

## 2021-07-26 PROCEDURE — 99213 OFFICE O/P EST LOW 20 MIN: CPT

## 2021-07-26 RX ORDER — CETIRIZINE HYDROCHLORIDE 10 MG/1
10 TABLET ORAL DAILY
Qty: 30 TABLET | Refills: 0 | Status: SHIPPED | OUTPATIENT
Start: 2021-07-26 | End: 2021-08-25

## 2021-07-26 RX ORDER — PREDNISONE 20 MG/1
60 TABLET ORAL ONCE
Status: COMPLETED | OUTPATIENT
Start: 2021-07-26 | End: 2021-07-26

## 2021-07-26 RX ORDER — PREDNISONE 20 MG/1
20 TABLET ORAL DAILY
Qty: 3 TABLET | Refills: 0 | Status: SHIPPED | OUTPATIENT
Start: 2021-07-26 | End: 2021-07-29

## 2021-07-26 RX ORDER — AMOXICILLIN 875 MG/1
875 TABLET, COATED ORAL 2 TIMES DAILY
Qty: 20 TABLET | Refills: 0 | Status: SHIPPED | OUTPATIENT
Start: 2021-07-26 | End: 2021-08-05

## 2021-07-26 NOTE — ED PROVIDER NOTES
Patient Seen in: [facility]      History   Patient presents with:  Sinus Problem    Stated Complaint: sinus infection    HPI/Subjective:   HPI    22-year-old female here with complaint of sinus pain and pressure for the past 4 to 5 days. 99%   BMI 33.61 kg/m²         Physical Exam  Vitals and nursing note reviewed. Constitutional:       Appearance: She is well-developed. HENT:      Head: Normocephalic. Jaw: There is normal jaw occlusion.       Right Ear: External ear normal. Tympan so upon discharge. I discuss the plan of care with the patient, who expresses understanding. All questions and concerns are addressed to the patient's satisfaction prior to discharge today. Previous conversations with PCP and charts were reviewed.

## 2021-07-26 NOTE — ED INITIAL ASSESSMENT (HPI)
Patient here for evaluation of sinus pain and pressure x 4-5 days. She has noticed an increase in bilateral ear pain and coughing up of yellow mucus. Denies any fevers, chills, headaches, or other symptoms.  She has taken OTC allergy medications without any

## 2021-07-28 ENCOUNTER — TELEPHONE (OUTPATIENT)
Dept: FAMILY MEDICINE CLINIC | Facility: CLINIC | Age: 41
End: 2021-07-28

## 2021-07-28 NOTE — TELEPHONE ENCOUNTER
Pt went to  for a sinus infection and start of ear infection. Pt was given medication to help but states her cough is worse today. Pt wondering if this is normal or if she needs to follow up with Dr WAYNE.     Please Advise.

## 2021-07-28 NOTE — TELEPHONE ENCOUNTER
S/w pt. Ears are improving  Cough is getting worse, feels it is raspy, productive-yellow  No sob/wheeze. No fevers  She is on prednisone and amox.   I advised she give the medications a few days (only on day 2 with cough) and see how she is feeling before

## 2021-07-29 ENCOUNTER — OFFICE VISIT (OUTPATIENT)
Dept: INTERNAL MEDICINE CLINIC | Facility: CLINIC | Age: 41
End: 2021-07-29
Payer: COMMERCIAL

## 2021-07-29 VITALS
HEART RATE: 80 BPM | RESPIRATION RATE: 16 BRPM | SYSTOLIC BLOOD PRESSURE: 114 MMHG | DIASTOLIC BLOOD PRESSURE: 72 MMHG | HEIGHT: 64.5 IN | BODY MASS INDEX: 35.59 KG/M2 | WEIGHT: 211 LBS

## 2021-07-29 DIAGNOSIS — Z51.81 ENCOUNTER FOR THERAPEUTIC DRUG MONITORING: Primary | ICD-10-CM

## 2021-07-29 DIAGNOSIS — E66.9 OBESITY (BMI 30-39.9): ICD-10-CM

## 2021-07-29 PROCEDURE — 3074F SYST BP LT 130 MM HG: CPT | Performed by: NURSE PRACTITIONER

## 2021-07-29 PROCEDURE — 3008F BODY MASS INDEX DOCD: CPT | Performed by: NURSE PRACTITIONER

## 2021-07-29 PROCEDURE — 99213 OFFICE O/P EST LOW 20 MIN: CPT | Performed by: NURSE PRACTITIONER

## 2021-07-29 PROCEDURE — 3078F DIAST BP <80 MM HG: CPT | Performed by: NURSE PRACTITIONER

## 2021-07-29 RX ORDER — PHENTERMINE HYDROCHLORIDE 37.5 MG/1
37.5 TABLET ORAL EVERY MORNING
Qty: 30 TABLET | Refills: 1 | Status: CANCELLED | OUTPATIENT
Start: 2021-07-29

## 2021-07-29 RX ORDER — SEMAGLUTIDE 0.25 MG/.5ML
0.25 INJECTION, SOLUTION SUBCUTANEOUS WEEKLY
COMMUNITY
End: 2021-09-07

## 2021-07-29 RX ORDER — SEMAGLUTIDE 0.5 MG/.5ML
0.5 INJECTION, SOLUTION SUBCUTANEOUS WEEKLY
Qty: 2 ML | Refills: 0 | Status: SHIPPED | OUTPATIENT
Start: 2021-07-29 | End: 2021-09-07

## 2021-07-29 NOTE — PATIENT INSTRUCTIONS
Continue making lifestyle changes that focus on good nutrition, regular exercise and stress management. Medication Plan: Start Wegovy at . 25 mg weekly for 3 weeks. Will plan to increase dose monthly if tolerated.  Use sample start and then I will send ne make changes when necessary. One of the best tools you can use to help you is the FITT Principle. FITT is an acronym that outlines the basic components of a successful exercise plan.   • Frequency – How often you exercise  • Intensity – How hard you exerci depend on what you are doing. Health experts recommend at least 30 minutes of cardio exercise each workout.  However, if you are doing a strength-based exercise, you will likely pay more attention to your number of “sets” and “reps.” Regardless, many other Why create a healthy food environment in your home? Every day, decisions we make about food are influenced by hundreds of factors, many of them subconscious. For example, an open box of cookies on the counter is more tempting than an apple in your crisper. table without distractions is a way to eat more mindfully and enjoy time with your family. Eating on the couch while watching shows or movies can encourage mindless eating and consuming more calories than intended.   6 – Keep Healthy Foods Readily Available

## 2021-07-29 NOTE — PROGRESS NOTES
Mya Valentine is a 35year old female presents today for follow-up on medical weight loss program for the treatment of overweight, obesity, or morbid obesity.     S:  Current weight Wt Readings from Last 6 Encounters:  07/29/21 : 211 lb (95.7 kg)  0 RRR without murmur, normal S1 and S2 without clicks or gallops, no pedal edema.   GI: +BS  NEURO/MS: motor and sensory grossly intact  PSYCH: pleasant, cooperative, normal mood and affect    ASSESSMENT AND PLAN:  Encounter for therapeutic drug monitoring  ( in touch via cell# or email requesting id# that's listed on card provided by our office. Please register this card ASAP and do not lose it. Refrigerate your next month supply until ready to use. You can visit [email or web address]. [email or web address]...His Place for additional information and s How Often Should You Exercise? It is a myth that you must work out for extended periods every day to lose weight and keep it off. What is considered an “effective” exercise varies between people.  Factors that affect this include age, fitness level, mobili to feel fatigued  • Weather, time of day  • Health conditions  What Should You Do for Exercise? Should you hit up the elliptical at the gym? Should you go for a hike? The type of exercise you do depends on what you like and what results you want.   For dread healthy options and stay on track with habits that support your health goals. Here are a few steps you can take to set up your home for success.   How to Build a [place] of Less-healthy Food  These foods are okay in mo on-hand so you don't make hunger-controlled choices.  This means keeping ready-to-go-snack foods in your kitchen such as fruit paired with nut butter, hummus and carrots, lunch meat and cheese roll-ups, etc. It may also help to keep pre-made freezer meals o

## 2021-08-11 ENCOUNTER — NURSE ONLY (OUTPATIENT)
Dept: INTERNAL MEDICINE CLINIC | Facility: CLINIC | Age: 41
End: 2021-08-11

## 2021-08-11 RX ORDER — SEMAGLUTIDE 0.25 MG/.5ML
0.25 INJECTION, SOLUTION SUBCUTANEOUS WEEKLY
Qty: 2 ML | Refills: 0 | Status: SHIPPED | COMMUNITY
Start: 2021-08-11 | End: 2021-09-01

## 2021-08-27 ENCOUNTER — PATIENT MESSAGE (OUTPATIENT)
Dept: INTERNAL MEDICINE CLINIC | Facility: CLINIC | Age: 41
End: 2021-08-27

## 2021-08-29 NOTE — TELEPHONE ENCOUNTER
From: Mya Valentine  To: STEPHANIE Funes  Sent: 8/27/2021 1:24 PM CDT  Subject: Prescription Question    I just received a text update from [facility] regarding my prescription.  They said its is time for a refill to be submitted and suggested t

## 2021-08-29 NOTE — TELEPHONE ENCOUNTER
Last seen 7/29/21  Future Appointments   Date Time Provider Carol Wood   9/7/2021  8:00 AM STEPHANIE Woodard [address]     Asking about Plenity?

## 2021-08-30 RX ORDER — CARBOXYMETHYLCELLULOSE/CITRIC 0.75 G
3 CAPSULE ORAL 2 TIMES DAILY
Qty: 180 CAPSULE | Refills: 1 | Status: SHIPPED | OUTPATIENT
Start: 2021-08-30 | End: 2022-01-03

## 2021-09-02 DIAGNOSIS — F43.9 STRESS: ICD-10-CM

## 2021-09-02 DIAGNOSIS — Z51.81 ENCOUNTER FOR THERAPEUTIC DRUG MONITORING: ICD-10-CM

## 2021-09-02 DIAGNOSIS — E66.9 OBESITY (BMI 30-39.9): ICD-10-CM

## 2021-09-02 RX ORDER — FLUOXETINE HYDROCHLORIDE 40 MG/1
CAPSULE ORAL
Qty: 90 CAPSULE | Refills: 0 | OUTPATIENT
Start: 2021-09-02

## 2021-09-07 ENCOUNTER — OFFICE VISIT (OUTPATIENT)
Dept: INTERNAL MEDICINE CLINIC | Facility: CLINIC | Age: 41
End: 2021-09-07
Payer: COMMERCIAL

## 2021-09-07 VITALS
BODY MASS INDEX: 34.57 KG/M2 | RESPIRATION RATE: 16 BRPM | SYSTOLIC BLOOD PRESSURE: 114 MMHG | DIASTOLIC BLOOD PRESSURE: 82 MMHG | HEIGHT: 64.5 IN | WEIGHT: 205 LBS | HEART RATE: 73 BPM

## 2021-09-07 DIAGNOSIS — Z51.81 ENCOUNTER FOR THERAPEUTIC DRUG MONITORING: Primary | ICD-10-CM

## 2021-09-07 DIAGNOSIS — K59.04 CHRONIC IDIOPATHIC CONSTIPATION: ICD-10-CM

## 2021-09-07 DIAGNOSIS — E66.9 OBESITY (BMI 30-39.9): ICD-10-CM

## 2021-09-07 PROCEDURE — 3074F SYST BP LT 130 MM HG: CPT | Performed by: NURSE PRACTITIONER

## 2021-09-07 PROCEDURE — 3008F BODY MASS INDEX DOCD: CPT | Performed by: NURSE PRACTITIONER

## 2021-09-07 PROCEDURE — 99213 OFFICE O/P EST LOW 20 MIN: CPT | Performed by: NURSE PRACTITIONER

## 2021-09-07 PROCEDURE — 3079F DIAST BP 80-89 MM HG: CPT | Performed by: NURSE PRACTITIONER

## 2021-09-07 RX ORDER — SEMAGLUTIDE 0.5 MG/.5ML
0.5 INJECTION, SOLUTION SUBCUTANEOUS WEEKLY
Qty: 2 ML | Refills: 2 | Status: SHIPPED | OUTPATIENT
Start: 2021-09-07 | End: 2021-11-17 | Stop reason: DRUGHIGH

## 2021-09-07 RX ORDER — PLECANATIDE 3 MG/1
3 TABLET ORAL DAILY
Qty: 90 TABLET | Refills: 1 | Status: SHIPPED | OUTPATIENT
Start: 2021-09-07 | End: 2021-10-07

## 2021-09-07 NOTE — PATIENT INSTRUCTIONS
Continue making lifestyle changes that focus on good nutrition, regular exercise and stress management. Medication Plan: Continue current medication dose with Wegovy at .5 mg weekly. New script for Trulance sent to pharmacy.     Next steps to work on [address] [facility] your healthcare professional's instructions.

## 2021-09-07 NOTE — PROGRESS NOTES
Richy Galarza is a 35year old female presents today for follow-up on medical weight loss program for the treatment of overweight, obesity, or morbid obesity.     S:  Current weight Wt Readings from Last 6 Encounters:  09/07/21 : 205 lb (93 kg)  07/ labored  CARDIO: RRR without murmur, normal S1 and S2 without clicks or gallops, no pedal edema.   GI: +BS  NEURO/MS: motor and sensory grossly intact  PSYCH: pleasant, cooperative, normal mood and affect    ASSESSMENT AND PLAN:  Encounter for therapeutic d Or it can be a problem that never seems to go away. The good news is that it can often be treated and controlled. Eat more fiber  One of the best ways to help treat constipation is to increase your fiber intake.  You can do this either through diet or by u

## 2021-09-30 ENCOUNTER — IMMUNIZATION (OUTPATIENT)
Dept: LAB | Age: 41
End: 2021-09-30

## 2021-09-30 DIAGNOSIS — Z23 NEED FOR VACCINATION: Primary | ICD-10-CM

## 2021-09-30 PROCEDURE — 91300 COVID 19 PFIZER-BIONTECH: CPT

## 2021-09-30 PROCEDURE — 0003A COVID 19 PFIZER-BIONTECH: CPT

## 2021-11-17 ENCOUNTER — OFFICE VISIT (OUTPATIENT)
Dept: INTERNAL MEDICINE CLINIC | Facility: CLINIC | Age: 41
End: 2021-11-17
Payer: COMMERCIAL

## 2021-11-17 VITALS
BODY MASS INDEX: 33.9 KG/M2 | RESPIRATION RATE: 16 BRPM | SYSTOLIC BLOOD PRESSURE: 122 MMHG | DIASTOLIC BLOOD PRESSURE: 70 MMHG | HEART RATE: 80 BPM | OXYGEN SATURATION: 98 % | HEIGHT: 64.5 IN | WEIGHT: 201 LBS

## 2021-11-17 DIAGNOSIS — Z51.81 ENCOUNTER FOR THERAPEUTIC DRUG MONITORING: Primary | ICD-10-CM

## 2021-11-17 DIAGNOSIS — E66.9 OBESITY (BMI 30-39.9): ICD-10-CM

## 2021-11-17 DIAGNOSIS — K59.04 CHRONIC IDIOPATHIC CONSTIPATION: ICD-10-CM

## 2021-11-17 PROCEDURE — 3078F DIAST BP <80 MM HG: CPT | Performed by: NURSE PRACTITIONER

## 2021-11-17 PROCEDURE — 3074F SYST BP LT 130 MM HG: CPT | Performed by: NURSE PRACTITIONER

## 2021-11-17 PROCEDURE — 99213 OFFICE O/P EST LOW 20 MIN: CPT | Performed by: NURSE PRACTITIONER

## 2021-11-17 PROCEDURE — 3008F BODY MASS INDEX DOCD: CPT | Performed by: NURSE PRACTITIONER

## 2021-11-17 RX ORDER — METFORMIN HYDROCHLORIDE 750 MG/1
1500 TABLET, EXTENDED RELEASE ORAL
Qty: 180 TABLET | Refills: 1 | Status: SHIPPED | OUTPATIENT
Start: 2021-11-17

## 2021-11-17 RX ORDER — SEMAGLUTIDE 1 MG/.5ML
1 INJECTION, SOLUTION SUBCUTANEOUS WEEKLY
Qty: 2 ML | Refills: 1 | Status: SHIPPED | OUTPATIENT
Start: 2021-11-17 | End: 2022-01-03 | Stop reason: DRUGHIGH

## 2021-11-17 NOTE — PATIENT INSTRUCTIONS
Continue making lifestyle changes that focus on good nutrition, regular exercise and stress management. Medication Plan: Increase Wegovy to 1 mg weekly - new script sent to [facility].  Restart Metformin ER 1 tab daily with meal for 7 days, then increase to deprive myself over the holidays.”  “Right now I don’t really care about calories.”  Sometimes you end up indulging. It becomes a problem when regret sinks in. Changing the Name of the Game    I propose that the holidays don’t have to be like this.  The Have a Strategy for Dealing with Family and Friends  Being around loved ones can soothe the soul.  However, family and friends may sometimes try to…    Convince you to eat food you don’t really want  Make you feel guilty about passing up their prized dish Huey Romero @ [email or web address]-[email or web address]/ Full fitness center with group fitness and personal training. Discount available as client of [facility].   · Health Coaching and Personal Training with Deonte Young Eating  · CALM meditation mable  · Headspace  · Am I Hungry? Mindful eating virtual  mable  · Www.yourweightmatters. org - Obesity Action Coalition sponsored Blog posts daily  · Motivation mable (black box with white \")- daily supportive messages sent to yo

## 2021-11-17 NOTE — PROGRESS NOTES
Edmar Castellon is a 35year old female presents today for follow-up on medical weight loss program for the treatment of overweight, obesity, or morbid obesity.     S:  Current weight Wt Readings from Last 6 Encounters:  11/17/21 : 201 lb (91.2 kg)  0 pedal edema. GI: +BS  NEURO/MS: motor and sensory grossly intact  PSYCH: pleasant, cooperative, normal mood and affect    ASSESSMENT AND PLAN:  Encounter for therapeutic drug monitoring  (primary encounter diagnosis)  Obesity (bmi 30-39. 9)  Chronic idiopa Simone Finely Yellow/Orange Red Purple   Monday Tuesday Wednesday Thursday Friday Saturday Sunday          It is recommended to eat 2-3 cups of vegetables and 1 cups of low carb fruits per day.  Bran Ortiz decision-making! I encourage you to try the strategies below for truly enjoying your holiday celebrations. When January gets here, you’ll be empowered to work at [employer].     Don’t Fret the Holidays – Stay in [address]    #1 Plan Carefully f you, others will find it harder to downplay what you are doing. Once at the event, try to stay under the radar. There’s no need to announce your “diet.” Others might not even notice what you are eating.  At the same time, try to be understanding of those https://padgett-kim.org/. Group Fitness [phone] and/or email Dewey adorno at [email or web address]. Motribe  · [employer] with multiple locations: [employer] (www.Probki Iz okna), Eat The Frog Fitness (www.Myrl. behavior change)   · Can't Hurt Me by Tad Marie (a book exploring the power of discipline in achieving your goals)  · The End of Dieting: How to Live for Life by Dr. Patsy Oscar M.D. or listen to The [facility] Episode 61:  Understandi

## 2021-11-30 ENCOUNTER — ORDER TRANSCRIPTION (OUTPATIENT)
Dept: ADMINISTRATIVE | Facility: HOSPITAL | Age: 41
End: 2021-11-30

## 2021-11-30 ENCOUNTER — PATIENT MESSAGE (OUTPATIENT)
Dept: FAMILY MEDICINE CLINIC | Facility: CLINIC | Age: 41
End: 2021-11-30

## 2021-11-30 DIAGNOSIS — Z11.59 ENCOUNTER FOR SCREENING FOR OTHER VIRAL DISEASES: ICD-10-CM

## 2021-11-30 DIAGNOSIS — Z12.31 ENCOUNTER FOR SCREENING MAMMOGRAM FOR MALIGNANT NEOPLASM OF BREAST: Primary | ICD-10-CM

## 2021-11-30 DIAGNOSIS — Z01.818 PREOP EXAMINATION: Primary | ICD-10-CM

## 2021-11-30 NOTE — TELEPHONE ENCOUNTER
From: Yuri Blank  To: Ulises Mcintosh DO  Sent: 11/30/2021 2:49 PM CST  Subject: Order for Mammogram    Hi there, I received a letter stating that it is time for my annual mammogram. I was directed to reach out to you to obtain my mammogram order.

## 2021-12-06 ENCOUNTER — LAB ENCOUNTER (OUTPATIENT)
Dept: LAB | Age: 41
End: 2021-12-06
Attending: INTERNAL MEDICINE
Payer: COMMERCIAL

## 2021-12-06 DIAGNOSIS — Z11.59 ENCOUNTER FOR SCREENING FOR OTHER VIRAL DISEASES: ICD-10-CM

## 2021-12-06 DIAGNOSIS — R06.02 SOB (SHORTNESS OF BREATH): Primary | ICD-10-CM

## 2021-12-06 DIAGNOSIS — Z82.49 FAMILY HISTORY OF ISCHEMIC HEART DISEASE: ICD-10-CM

## 2021-12-06 DIAGNOSIS — Z01.818 PREOP EXAMINATION: ICD-10-CM

## 2021-12-06 PROCEDURE — 85025 COMPLETE CBC W/AUTO DIFF WBC: CPT

## 2021-12-06 PROCEDURE — 83880 ASSAY OF NATRIURETIC PEPTIDE: CPT

## 2021-12-06 PROCEDURE — 80053 COMPREHEN METABOLIC PANEL: CPT

## 2021-12-06 PROCEDURE — 84443 ASSAY THYROID STIM HORMONE: CPT

## 2021-12-06 PROCEDURE — 80061 LIPID PANEL: CPT

## 2021-12-07 ENCOUNTER — HOSPITAL ENCOUNTER (OUTPATIENT)
Dept: MAMMOGRAPHY | Age: 41
Discharge: HOME | End: 2021-12-07
Attending: FAMILY MEDICINE
Payer: COMMERCIAL

## 2021-12-07 DIAGNOSIS — Z12.31 ENCOUNTER FOR SCREENING MAMMOGRAM FOR MALIGNANT NEOPLASM OF BREAST: ICD-10-CM

## 2021-12-07 PROCEDURE — 77067 SCR MAMMO BI INCL CAD: CPT | Performed by: FAMILY MEDICINE

## 2021-12-07 PROCEDURE — 77063 BREAST TOMOSYNTHESIS BI: CPT | Performed by: FAMILY MEDICINE

## 2021-12-08 ENCOUNTER — HOSPITAL ENCOUNTER (OUTPATIENT)
Dept: CV DIAGNOSTICS | Facility: HOSPITAL | Age: 41
Discharge: HOME | End: 2021-12-08
Attending: INTERNAL MEDICINE
Payer: COMMERCIAL

## 2021-12-08 DIAGNOSIS — R06.02 SOB (SHORTNESS OF BREATH): ICD-10-CM

## 2021-12-08 DIAGNOSIS — Z82.49 FAMILY HISTORY OF ISCHEMIC HEART DISEASE: ICD-10-CM

## 2021-12-08 PROCEDURE — 93350 STRESS TTE ONLY: CPT | Performed by: INTERNAL MEDICINE

## 2021-12-08 PROCEDURE — 93018 CV STRESS TEST I&R ONLY: CPT | Performed by: INTERNAL MEDICINE

## 2021-12-08 PROCEDURE — 93017 CV STRESS TEST TRACING ONLY: CPT | Performed by: INTERNAL MEDICINE

## 2021-12-10 ENCOUNTER — PATIENT MESSAGE (OUTPATIENT)
Dept: FAMILY MEDICINE CLINIC | Facility: CLINIC | Age: 41
End: 2021-12-10

## 2021-12-23 DIAGNOSIS — Z51.81 ENCOUNTER FOR THERAPEUTIC DRUG MONITORING: ICD-10-CM

## 2021-12-23 DIAGNOSIS — E66.9 OBESITY (BMI 30-39.9): ICD-10-CM

## 2021-12-23 NOTE — TELEPHONE ENCOUNTER
Requesting Wegovy 1 mg  LOV: 11/17/21  RTC: 2 months  Last Relevant Labs: na  Filled: 11/17/21 #2ml with 1 refills    Future Appointments   Date Time Provider Carol Wood   1/3/2022  8:20 AM STEPHANIE Orozco [id number]   1/20/2022  7:3

## 2022-01-03 ENCOUNTER — OFFICE VISIT (OUTPATIENT)
Dept: INTERNAL MEDICINE CLINIC | Facility: CLINIC | Age: 42
End: 2022-01-03
Payer: COMMERCIAL

## 2022-01-03 VITALS
SYSTOLIC BLOOD PRESSURE: 126 MMHG | DIASTOLIC BLOOD PRESSURE: 78 MMHG | HEIGHT: 65 IN | RESPIRATION RATE: 16 BRPM | WEIGHT: 203 LBS | BODY MASS INDEX: 33.82 KG/M2 | HEART RATE: 80 BPM

## 2022-01-03 DIAGNOSIS — Z51.81 ENCOUNTER FOR THERAPEUTIC DRUG MONITORING: Primary | ICD-10-CM

## 2022-01-03 DIAGNOSIS — K59.04 CHRONIC IDIOPATHIC CONSTIPATION: ICD-10-CM

## 2022-01-03 DIAGNOSIS — E66.9 OBESITY (BMI 30-39.9): ICD-10-CM

## 2022-01-03 PROCEDURE — 99213 OFFICE O/P EST LOW 20 MIN: CPT | Performed by: NURSE PRACTITIONER

## 2022-01-03 PROCEDURE — 3078F DIAST BP <80 MM HG: CPT | Performed by: NURSE PRACTITIONER

## 2022-01-03 PROCEDURE — 3008F BODY MASS INDEX DOCD: CPT | Performed by: NURSE PRACTITIONER

## 2022-01-03 PROCEDURE — 3074F SYST BP LT 130 MM HG: CPT | Performed by: NURSE PRACTITIONER

## 2022-01-03 RX ORDER — SEMAGLUTIDE 1 MG/.5ML
1 INJECTION, SOLUTION SUBCUTANEOUS WEEKLY
Qty: 2 ML | Refills: 1 | OUTPATIENT
Start: 2022-01-03

## 2022-01-03 RX ORDER — SEMAGLUTIDE 1.7 MG/.75ML
1.7 INJECTION, SOLUTION SUBCUTANEOUS WEEKLY
Qty: 3 ML | Refills: 2 | Status: SHIPPED | OUTPATIENT
Start: 2022-01-03

## 2022-01-03 RX ORDER — SEMAGLUTIDE 1 MG/.5ML
1 INJECTION, SOLUTION SUBCUTANEOUS WEEKLY
Qty: 2 ML | Refills: 1 | Status: CANCELLED | OUTPATIENT
Start: 2022-01-03

## 2022-01-03 NOTE — PROGRESS NOTES
Ronit Silvestre is a 35year old female presents today for follow-up on medical weight loss program for the treatment of overweight, obesity, or morbid obesity.     S:  Current weight Wt Readings from Last 6 Encounters:  01/03/22 : 203 lb (92.1 kg)  1 fields, breathing non labored  CARDIO: RRR without murmur, normal S1 and S2 without clicks or gallops, no pedal edema.   GI: +BS  NEURO/MS: motor and sensory grossly intact  PSYCH: pleasant, cooperative, normal mood and affect    ASSESSMENT AND PLAN:  Encou Food Environment in [address] [place] [address]  December 28, 2020 • Posted in Jomar Ruiz, Nutrition • By Your Weight Matters Campaign    Why create a healthy food environment in your home?  Every day, decisions we make about food are influenced by hundreds of factors, many of the you can always locate what you need without distraction. 5 – Eat at Lien Enforcement Table  Eating at your kitchen table without distractions is a way to eat more mindfully and enjoy time with your family.  Eating on the couch while watching shows or movies ca series   www. vjpou04PWG. [employer]  · Sit and Be Fit - Chair exercise series Www.sitandbefit. org  · Hip Hop Fit with Cirilo Fraser at www.hiphopfit. net    Apps for on the Go Fitness  · [place] 7 Minute Workout (orange box with white 7) - free on the go HIIT training a Dr. Edwin Nance  · The Complete Guide to fasting by Dr. Warren Record  · Sugar, Salt & Fat by Meir Matthews, Ph.D, R.D.  · Weight Loss Surgery Will Not Treat Food Addiction by Radha Ferreira Ph.D  · The Game Changers- GP[employer] Software Documentary on plant based nutrition

## 2022-01-03 NOTE — PATIENT INSTRUCTIONS
Continue making lifestyle changes that focus on good nutrition, regular exercise and stress management. Medication Plan: Continue current medication regimen aside from increase Wegovy to 1.7 mg weekly.     Next steps to work on before next office visit i examples:  • Calorie-controlled frozen yogurt bars like Yasso vs high-calorie ice cream novelties  • Sparkling water like Bubbly vs soda or fruit juice  • Skinny popcorn vs chips and crackers  • Dessert hummus vs pudding cups or ice cream  4 – Clean and Or training and small group fitness classes targeted at weight loss- [phone] and/or email @ Alexia Ferreira. Milli@[email or web address] org  · 360FIT Jayy @ https://padgett-kim.org/. Group Fitness [phone] and/or email Ying Lara at [email or web address] [employer]  · F Erik Love (a book about insulin resistance)  · Atomic Habits by Lolly Oconnor (a book about taking small steps to promote greater behavior change)   · Can't Hurt Me by Juan M Fishman (a book exploring the power of discipline in achieving your goals)  ·

## 2022-01-16 DIAGNOSIS — E66.9 OBESITY (BMI 30-39.9): ICD-10-CM

## 2022-01-16 DIAGNOSIS — Z51.81 ENCOUNTER FOR THERAPEUTIC DRUG MONITORING: ICD-10-CM

## 2022-01-17 RX ORDER — SEMAGLUTIDE 1 MG/.5ML
1 INJECTION, SOLUTION SUBCUTANEOUS WEEKLY
Qty: 2 ML | Refills: 1 | OUTPATIENT
Start: 2022-01-17

## 2022-01-17 NOTE — TELEPHONE ENCOUNTER
Requesting Wegovy 1 mg  LOV: 1/3/22  RTC: one month  Last Relevant Labs: na  Filled: 1/3/22 #3ml with 2 refills at 1.7 mg dose    Denied lower dose refill as 1.7 was sent

## 2022-01-20 ENCOUNTER — OFFICE VISIT (OUTPATIENT)
Dept: FAMILY MEDICINE CLINIC | Facility: CLINIC | Age: 42
End: 2022-01-20
Payer: COMMERCIAL

## 2022-01-20 VITALS
HEIGHT: 64.5 IN | WEIGHT: 198 LBS | RESPIRATION RATE: 18 BRPM | DIASTOLIC BLOOD PRESSURE: 80 MMHG | SYSTOLIC BLOOD PRESSURE: 126 MMHG | TEMPERATURE: 98 F | BODY MASS INDEX: 33.39 KG/M2 | HEART RATE: 76 BPM

## 2022-01-20 DIAGNOSIS — Z11.3 SCREENING FOR STD (SEXUALLY TRANSMITTED DISEASE): ICD-10-CM

## 2022-01-20 DIAGNOSIS — Z00.00 ROUTINE GENERAL MEDICAL EXAMINATION AT A HEALTH CARE FACILITY: Primary | ICD-10-CM

## 2022-01-20 DIAGNOSIS — E55.9 VITAMIN D DEFICIENCY: ICD-10-CM

## 2022-01-20 DIAGNOSIS — Z00.00 LABORATORY EXAMINATION ORDERED AS PART OF A ROUTINE GENERAL MEDICAL EXAMINATION: ICD-10-CM

## 2022-01-20 DIAGNOSIS — Z12.4 SCREENING FOR MALIGNANT NEOPLASM OF CERVIX: ICD-10-CM

## 2022-01-20 DIAGNOSIS — Z13.89 SCREENING FOR GENITOURINARY CONDITION: ICD-10-CM

## 2022-01-20 LAB
BILIRUB UR QL STRIP.AUTO: NEGATIVE
CLARITY UR REFRACT.AUTO: CLEAR
COLOR UR AUTO: YELLOW
GLUCOSE UR STRIP.AUTO-MCNC: NEGATIVE MG/DL
KETONES UR STRIP.AUTO-MCNC: NEGATIVE MG/DL
LEUKOCYTE ESTERASE UR QL STRIP.AUTO: NEGATIVE
NITRITE UR QL STRIP.AUTO: NEGATIVE
PH UR STRIP.AUTO: 8 [PH] (ref 5–8)
PROT UR STRIP.AUTO-MCNC: NEGATIVE MG/DL
RBC UR QL AUTO: NEGATIVE
SP GR UR STRIP.AUTO: 1.02 (ref 1–1.03)
UROBILINOGEN UR STRIP.AUTO-MCNC: <2 MG/DL
VIT D+METAB SERPL-MCNC: 18.9 NG/ML (ref 30–100)

## 2022-01-20 PROCEDURE — 87389 HIV-1 AG W/HIV-1&-2 AB AG IA: CPT | Performed by: FAMILY MEDICINE

## 2022-01-20 PROCEDURE — 87624 HPV HI-RISK TYP POOLED RSLT: CPT | Performed by: FAMILY MEDICINE

## 2022-01-20 PROCEDURE — 99396 PREV VISIT EST AGE 40-64: CPT | Performed by: FAMILY MEDICINE

## 2022-01-20 PROCEDURE — 3074F SYST BP LT 130 MM HG: CPT | Performed by: FAMILY MEDICINE

## 2022-01-20 PROCEDURE — 87591 N.GONORRHOEAE DNA AMP PROB: CPT | Performed by: FAMILY MEDICINE

## 2022-01-20 PROCEDURE — 87491 CHLMYD TRACH DNA AMP PROBE: CPT | Performed by: FAMILY MEDICINE

## 2022-01-20 PROCEDURE — 3008F BODY MASS INDEX DOCD: CPT | Performed by: FAMILY MEDICINE

## 2022-01-20 PROCEDURE — 81003 URINALYSIS AUTO W/O SCOPE: CPT | Performed by: FAMILY MEDICINE

## 2022-01-20 PROCEDURE — 3079F DIAST BP 80-89 MM HG: CPT | Performed by: FAMILY MEDICINE

## 2022-01-20 PROCEDURE — 82306 VITAMIN D 25 HYDROXY: CPT | Performed by: FAMILY MEDICINE

## 2022-01-20 NOTE — H&P
CC: Annual Physical Exam    HPI:   Simeon Rodriguez is a 35year old female who presents for a complete physical exam. Symptoms: periods are regular. Patient complains of nothing.         Wt Readings from Last 6 Encounters:  01/20/22 : 198 lb (89.8 kg) (L) 31.0 - 37.0 g/dL    RDW 15.6 %    Neutrophil Absolute Prelim 2.97 1.50 - 7.70 x10 (3) uL    Neutrophil Absolute 2.97 1.50 - 7.70 x10(3) uL    Lymphocyte Absolute 1.59 1.00 - 4.00 x10(3) uL    Monocyte Absolute 0.36 0.10 - 1.00 x10(3) uL    Eosinophil A COLONOSCOPY  04/12/10   • REDUCTION LEFT      2001   • REDUCTION RIGHT        Family History   Problem Relation Age of Onset   • Cancer Paternal Grandmother         breast   • Hormone Disorder Paternal Grandmother    • Breast Cancer Paternal Grandmother 62 tenderness  BREAST: no dominant or suspicious mass, no nipple discharge; hx of breast reducation  LUNGS: clear to auscultation  CARDIO: RRR without murmur  GI: good BS's,no masses, HSM or tenderness  :introitus is normal,bleeding ,cervix is pink,no adnex

## 2022-01-21 LAB
C TRACH DNA SPEC QL NAA+PROBE: NEGATIVE
N GONORRHOEA DNA SPEC QL NAA+PROBE: NEGATIVE

## 2022-01-22 DIAGNOSIS — E55.9 VITAMIN D DEFICIENCY: Primary | ICD-10-CM

## 2022-01-22 NOTE — PROGRESS NOTES
Dear Jia Kerr,    Your test for HIV, GC and chlamydia are negative. Your vitamin D is low -- if currently taking vitamin D 2000 units, increase to 5000 units daily. Repeat your vitamin D level in 3 months and I will place an order into the system for you.

## 2022-01-31 LAB — HPV I/H RISK 1 DNA SPEC QL NAA+PROBE: NEGATIVE

## 2022-02-22 ENCOUNTER — TELEPHONE (OUTPATIENT)
Dept: INTERNAL MEDICINE CLINIC | Facility: CLINIC | Age: 42
End: 2022-02-22

## 2022-03-04 ENCOUNTER — PATIENT MESSAGE (OUTPATIENT)
Dept: FAMILY MEDICINE CLINIC | Facility: CLINIC | Age: 42
End: 2022-03-04

## 2022-03-04 ENCOUNTER — TELEPHONE (OUTPATIENT)
Dept: FAMILY MEDICINE CLINIC | Facility: CLINIC | Age: 42
End: 2022-03-04

## 2022-03-04 ENCOUNTER — OFFICE VISIT (OUTPATIENT)
Dept: FAMILY MEDICINE CLINIC | Facility: CLINIC | Age: 42
End: 2022-03-04
Payer: COMMERCIAL

## 2022-03-04 VITALS
WEIGHT: 196 LBS | OXYGEN SATURATION: 100 % | DIASTOLIC BLOOD PRESSURE: 70 MMHG | BODY MASS INDEX: 33.05 KG/M2 | RESPIRATION RATE: 16 BRPM | TEMPERATURE: 98 F | SYSTOLIC BLOOD PRESSURE: 110 MMHG | HEART RATE: 80 BPM | HEIGHT: 64.5 IN

## 2022-03-04 DIAGNOSIS — M26.609 TMJ (TEMPOROMANDIBULAR JOINT DISORDER): Primary | ICD-10-CM

## 2022-03-04 PROCEDURE — 3074F SYST BP LT 130 MM HG: CPT | Performed by: STUDENT IN AN ORGANIZED HEALTH CARE EDUCATION/TRAINING PROGRAM

## 2022-03-04 PROCEDURE — 3078F DIAST BP <80 MM HG: CPT | Performed by: STUDENT IN AN ORGANIZED HEALTH CARE EDUCATION/TRAINING PROGRAM

## 2022-03-04 PROCEDURE — 99212 OFFICE O/P EST SF 10 MIN: CPT | Performed by: STUDENT IN AN ORGANIZED HEALTH CARE EDUCATION/TRAINING PROGRAM

## 2022-03-04 PROCEDURE — 3008F BODY MASS INDEX DOCD: CPT | Performed by: STUDENT IN AN ORGANIZED HEALTH CARE EDUCATION/TRAINING PROGRAM

## 2022-03-04 RX ORDER — NAPROXEN 500 MG/1
500 TABLET ORAL 2 TIMES DAILY WITH MEALS
Qty: 20 TABLET | Refills: 0 | Status: SHIPPED | OUTPATIENT
Start: 2022-03-04

## 2022-03-04 NOTE — TELEPHONE ENCOUNTER
Gris sent a message to Dr Jonathan Lamb, regarding her jaw with pain clicking and popping, she did make a appt with Dr Nima Alonso today at [phone], Terra Reese would like to know if Dr Jonathan Lamb can see her or if it is okay for Dr Nima Alonso to see her, please call Terra Reese at [phone]

## 2022-03-04 NOTE — TELEPHONE ENCOUNTER
From: Cynthia Jiménez  To: Marion Lundborg, DO  Sent: 3/4/2022 8:23 AM CST  Subject: Clicking and popping jaw with lots of pain    Hi Dr. Lindsey Acosta had clicking and popping in my jaws for about a year. The dentist gave me a  that helps with it. The last couple of days the clicking and popping has been really bad. My jaw is really sore and it hurts to open my mouth, talk and chew. Do you have any available time to see me today?

## 2022-03-13 DIAGNOSIS — Z51.81 ENCOUNTER FOR THERAPEUTIC DRUG MONITORING: ICD-10-CM

## 2022-03-13 DIAGNOSIS — E66.9 OBESITY (BMI 30-39.9): ICD-10-CM

## 2022-03-13 RX ORDER — SEMAGLUTIDE 1.7 MG/.75ML
1.7 INJECTION, SOLUTION SUBCUTANEOUS WEEKLY
Qty: 3 ML | Refills: 2 | OUTPATIENT
Start: 2022-03-13

## 2022-03-13 RX ORDER — SEMAGLUTIDE 1.7 MG/.75ML
1.7 INJECTION, SOLUTION SUBCUTANEOUS WEEKLY
Qty: 3 ML | Refills: 2 | Status: CANCELLED | OUTPATIENT
Start: 2022-03-13

## 2022-03-21 RX ORDER — FLUOXETINE HYDROCHLORIDE 40 MG/1
CAPSULE ORAL
Qty: 90 CAPSULE | Refills: 0 | OUTPATIENT
Start: 2022-03-21

## 2022-04-04 ENCOUNTER — OFFICE VISIT (OUTPATIENT)
Dept: INTERNAL MEDICINE CLINIC | Facility: CLINIC | Age: 42
End: 2022-04-04
Payer: COMMERCIAL

## 2022-04-04 VITALS
WEIGHT: 190 LBS | BODY MASS INDEX: 32.04 KG/M2 | RESPIRATION RATE: 16 BRPM | SYSTOLIC BLOOD PRESSURE: 110 MMHG | DIASTOLIC BLOOD PRESSURE: 76 MMHG | HEART RATE: 80 BPM | HEIGHT: 64.5 IN

## 2022-04-04 DIAGNOSIS — Z51.81 ENCOUNTER FOR THERAPEUTIC DRUG MONITORING: Primary | ICD-10-CM

## 2022-04-04 DIAGNOSIS — K59.04 CHRONIC IDIOPATHIC CONSTIPATION: ICD-10-CM

## 2022-04-04 DIAGNOSIS — R63.8 CRAVING FOR PARTICULAR FOOD: ICD-10-CM

## 2022-04-04 DIAGNOSIS — E66.9 OBESITY (BMI 30-39.9): ICD-10-CM

## 2022-04-04 PROCEDURE — 3078F DIAST BP <80 MM HG: CPT | Performed by: NURSE PRACTITIONER

## 2022-04-04 PROCEDURE — 99213 OFFICE O/P EST LOW 20 MIN: CPT | Performed by: NURSE PRACTITIONER

## 2022-04-04 PROCEDURE — 3074F SYST BP LT 130 MM HG: CPT | Performed by: NURSE PRACTITIONER

## 2022-04-04 PROCEDURE — 3008F BODY MASS INDEX DOCD: CPT | Performed by: NURSE PRACTITIONER

## 2022-04-04 RX ORDER — FLUOXETINE HYDROCHLORIDE 20 MG/1
20 CAPSULE ORAL DAILY
Qty: 90 CAPSULE | Refills: 1 | Status: SHIPPED | OUTPATIENT
Start: 2022-04-04

## 2022-04-04 RX ORDER — SEMAGLUTIDE 2.4 MG/.75ML
2.4 INJECTION, SOLUTION SUBCUTANEOUS WEEKLY
Qty: 9 ML | Refills: 1 | Status: SHIPPED | OUTPATIENT
Start: 2022-04-04

## 2022-04-04 RX ORDER — SEMAGLUTIDE 1.7 MG/.75ML
1.7 INJECTION, SOLUTION SUBCUTANEOUS WEEKLY
Qty: 3 ML | Refills: 2 | Status: CANCELLED | OUTPATIENT
Start: 2022-04-04

## 2022-04-06 ENCOUNTER — TELEPHONE (OUTPATIENT)
Dept: INTERNAL MEDICINE CLINIC | Facility: CLINIC | Age: 42
End: 2022-04-06

## 2022-04-06 NOTE — TELEPHONE ENCOUNTER
PA needed for wegovy 2.4 mg from [facility]  Prior PA  3/24/22    [facility] [phone] [facility] [place] [facility] [phone]    DIAGNOSIS E66.9    Started wegovy 21 at 211# and BMI 35.66                            22  At 190# and BMI 32.11    Completed PA on the phone with Jose Enrique Duffy CASE # [id number]  3/7/22 to 2023  My chart to patient

## 2022-04-13 ENCOUNTER — MED REC SCAN ONLY (OUTPATIENT)
Dept: FAMILY MEDICINE CLINIC | Facility: CLINIC | Age: 42
End: 2022-04-13

## 2022-04-15 ENCOUNTER — TELEPHONE (OUTPATIENT)
Dept: INTERNAL MEDICINE CLINIC | Facility: CLINIC | Age: 42
End: 2022-04-15

## 2022-04-15 NOTE — TELEPHONE ENCOUNTER
Form faxed to our office to fill metformin  mg and Fluoxetine 40 mg with [facility]  Fluoxetine listed as 20 mg on file    Message to patient to verify

## 2022-04-27 ENCOUNTER — PATIENT MESSAGE (OUTPATIENT)
Dept: FAMILY MEDICINE CLINIC | Facility: CLINIC | Age: 42
End: 2022-04-27

## 2022-04-27 RX ORDER — FLUCONAZOLE 150 MG/1
150 TABLET ORAL ONCE
Qty: 1 TABLET | Refills: 0 | Status: SHIPPED | OUTPATIENT
Start: 2022-04-27 | End: 2022-04-27

## 2022-04-27 NOTE — TELEPHONE ENCOUNTER
From: Margy Kramer  To: Radha Beckford DO  Sent: 4/27/2022 9:55 AM CDT  Subject: Yeast Infection    Hi there. . I am starting to get a yeast infection. Can you call in the oral pill to my [facility]? I think its is called Fluconazole or something like that. I've had it before in the past and it works great for me.     Thanks so much,    [employer]

## 2022-05-02 RX ORDER — NAPROXEN 500 MG/1
TABLET ORAL
Qty: 20 TABLET | Refills: 0 | Status: SHIPPED | OUTPATIENT
Start: 2022-05-02

## 2022-07-11 ENCOUNTER — OFFICE VISIT (OUTPATIENT)
Dept: INTERNAL MEDICINE CLINIC | Facility: CLINIC | Age: 42
End: 2022-07-11
Payer: COMMERCIAL

## 2022-07-11 VITALS
RESPIRATION RATE: 14 BRPM | SYSTOLIC BLOOD PRESSURE: 110 MMHG | DIASTOLIC BLOOD PRESSURE: 70 MMHG | WEIGHT: 181 LBS | BODY MASS INDEX: 30.52 KG/M2 | HEART RATE: 78 BPM | HEIGHT: 64.5 IN

## 2022-07-11 DIAGNOSIS — R63.8 CRAVING FOR PARTICULAR FOOD: ICD-10-CM

## 2022-07-11 DIAGNOSIS — Z51.81 ENCOUNTER FOR THERAPEUTIC DRUG MONITORING: Primary | ICD-10-CM

## 2022-07-11 DIAGNOSIS — K59.04 CHRONIC IDIOPATHIC CONSTIPATION: ICD-10-CM

## 2022-07-11 DIAGNOSIS — E66.9 OBESITY (BMI 30-39.9): ICD-10-CM

## 2022-07-11 PROCEDURE — 3074F SYST BP LT 130 MM HG: CPT | Performed by: NURSE PRACTITIONER

## 2022-07-11 PROCEDURE — 3078F DIAST BP <80 MM HG: CPT | Performed by: NURSE PRACTITIONER

## 2022-07-11 PROCEDURE — 99213 OFFICE O/P EST LOW 20 MIN: CPT | Performed by: NURSE PRACTITIONER

## 2022-07-11 PROCEDURE — 3008F BODY MASS INDEX DOCD: CPT | Performed by: NURSE PRACTITIONER

## 2022-07-11 RX ORDER — PLECANATIDE 3 MG/1
3 TABLET ORAL DAILY
Qty: 90 TABLET | Refills: 1 | Status: SHIPPED | OUTPATIENT
Start: 2022-07-11 | End: 2022-08-10

## 2022-07-11 RX ORDER — METFORMIN HYDROCHLORIDE 750 MG/1
750 TABLET, EXTENDED RELEASE ORAL
Qty: 90 TABLET | Refills: 0 | COMMUNITY
Start: 2022-07-11

## 2022-08-16 ENCOUNTER — PATIENT MESSAGE (OUTPATIENT)
Dept: INTERNAL MEDICINE CLINIC | Facility: CLINIC | Age: 42
End: 2022-08-16

## 2022-08-16 DIAGNOSIS — Z51.81 ENCOUNTER FOR THERAPEUTIC DRUG MONITORING: ICD-10-CM

## 2022-08-16 DIAGNOSIS — R63.8 CRAVING FOR PARTICULAR FOOD: ICD-10-CM

## 2022-08-16 DIAGNOSIS — E66.9 OBESITY (BMI 30-39.9): ICD-10-CM

## 2022-08-16 RX ORDER — METFORMIN HYDROCHLORIDE 750 MG/1
750 TABLET, EXTENDED RELEASE ORAL 2 TIMES DAILY WITH MEALS
Qty: 180 TABLET | Refills: 1 | Status: SHIPPED | OUTPATIENT
Start: 2022-08-16

## 2022-08-16 RX ORDER — FLUOXETINE HYDROCHLORIDE 20 MG/1
20 CAPSULE ORAL DAILY
Qty: 90 CAPSULE | Refills: 1 | Status: SHIPPED | OUTPATIENT
Start: 2022-08-16

## 2022-08-16 NOTE — TELEPHONE ENCOUNTER
Requesting metformin and fluoxetine  LOV: 7/11/22  RTC: 3 months  Last Relevant Labs: no a1c on file  Filled: 4/4/22 #90 with 1 refills fluoxetine sent to [address] [place] [address] 49: 11/17/21 #180 with 1 refills  metformin    Future Appointments   Date Time Provider Carol Wood   10/10/2022  8:00 AM STEPHANIE Szymanski EMGWEI [id number]

## 2022-08-16 NOTE — TELEPHONE ENCOUNTER
From: Magui Barth  To: STEPHANIE Mcconnell  Sent: 8/16/2022 9:29 AM CDT  Subject: Refill request    Hi there. . Can you send in a Metformin and Fluoxetine request to [facility].     Thanks so much

## 2022-09-21 DIAGNOSIS — Z51.81 ENCOUNTER FOR THERAPEUTIC DRUG MONITORING: ICD-10-CM

## 2022-09-21 DIAGNOSIS — E66.9 OBESITY (BMI 30-39.9): ICD-10-CM

## 2022-09-22 NOTE — TELEPHONE ENCOUNTER
Requesting Wegovy 2.4 mg  LOV: 7/11/22  RTC: 3 months  Last Relevant Labs: na  Filled: 7/11/22 #9ml with 0 refills    Future Appointments   Date Time Provider Carol Wood   10/10/2022  8:00 AM STEPHANIE Lane [facility] [id number]

## 2022-09-23 RX ORDER — SEMAGLUTIDE 2.4 MG/.75ML
INJECTION, SOLUTION SUBCUTANEOUS
Qty: 9 ML | Refills: 0 | Status: SHIPPED | OUTPATIENT
Start: 2022-09-23

## 2022-10-10 ENCOUNTER — OFFICE VISIT (OUTPATIENT)
Dept: INTERNAL MEDICINE CLINIC | Facility: CLINIC | Age: 42
End: 2022-10-10
Payer: COMMERCIAL

## 2022-10-10 VITALS
HEART RATE: 76 BPM | RESPIRATION RATE: 16 BRPM | WEIGHT: 174 LBS | OXYGEN SATURATION: 99 % | DIASTOLIC BLOOD PRESSURE: 80 MMHG | SYSTOLIC BLOOD PRESSURE: 110 MMHG | HEIGHT: 64.5 IN | BODY MASS INDEX: 29.35 KG/M2

## 2022-10-10 DIAGNOSIS — K59.04 CHRONIC IDIOPATHIC CONSTIPATION: ICD-10-CM

## 2022-10-10 DIAGNOSIS — Z51.81 ENCOUNTER FOR THERAPEUTIC DRUG MONITORING: Primary | ICD-10-CM

## 2022-10-10 DIAGNOSIS — E66.9 OBESITY (BMI 30-39.9): ICD-10-CM

## 2022-10-10 PROCEDURE — 3008F BODY MASS INDEX DOCD: CPT | Performed by: NURSE PRACTITIONER

## 2022-10-10 PROCEDURE — 3074F SYST BP LT 130 MM HG: CPT | Performed by: NURSE PRACTITIONER

## 2022-10-10 PROCEDURE — 3079F DIAST BP 80-89 MM HG: CPT | Performed by: NURSE PRACTITIONER

## 2022-10-10 PROCEDURE — 99213 OFFICE O/P EST LOW 20 MIN: CPT | Performed by: NURSE PRACTITIONER

## 2022-10-10 RX ORDER — PLECANATIDE 3 MG/1
TABLET ORAL
COMMUNITY
Start: 2022-10-07

## 2022-10-17 ENCOUNTER — OFFICE VISIT (OUTPATIENT)
Dept: FAMILY MEDICINE CLINIC | Facility: CLINIC | Age: 42
End: 2022-10-17
Payer: COMMERCIAL

## 2022-10-17 ENCOUNTER — TELEPHONE (OUTPATIENT)
Dept: FAMILY MEDICINE CLINIC | Facility: CLINIC | Age: 42
End: 2022-10-17

## 2022-10-17 VITALS
HEIGHT: 64.5 IN | RESPIRATION RATE: 18 BRPM | BODY MASS INDEX: 29.35 KG/M2 | OXYGEN SATURATION: 96 % | HEART RATE: 71 BPM | WEIGHT: 174 LBS | TEMPERATURE: 98 F | SYSTOLIC BLOOD PRESSURE: 126 MMHG | DIASTOLIC BLOOD PRESSURE: 84 MMHG

## 2022-10-17 DIAGNOSIS — M21.372 LEFT FOOT DROP: Primary | ICD-10-CM

## 2022-10-17 PROCEDURE — 3079F DIAST BP 80-89 MM HG: CPT | Performed by: STUDENT IN AN ORGANIZED HEALTH CARE EDUCATION/TRAINING PROGRAM

## 2022-10-17 PROCEDURE — 99213 OFFICE O/P EST LOW 20 MIN: CPT | Performed by: STUDENT IN AN ORGANIZED HEALTH CARE EDUCATION/TRAINING PROGRAM

## 2022-10-17 PROCEDURE — 3008F BODY MASS INDEX DOCD: CPT | Performed by: STUDENT IN AN ORGANIZED HEALTH CARE EDUCATION/TRAINING PROGRAM

## 2022-10-17 PROCEDURE — 3074F SYST BP LT 130 MM HG: CPT | Performed by: STUDENT IN AN ORGANIZED HEALTH CARE EDUCATION/TRAINING PROGRAM

## 2022-10-17 NOTE — TELEPHONE ENCOUNTER
Pt is calling because her foot has been tingling the past couple of days. Yesterday, she started tripping over the foot. Today she is having trouble flexing her foot. Pt would like to know if this may be something urgent or can it wait until she is home from [place].      Thank you

## 2022-10-18 ENCOUNTER — TELEPHONE (OUTPATIENT)
Dept: FAMILY MEDICINE CLINIC | Facility: CLINIC | Age: 42
End: 2022-10-18

## 2022-10-18 DIAGNOSIS — M21.372 LEFT FOOT DROP: Primary | ICD-10-CM

## 2022-10-18 RX ORDER — METHYLPREDNISOLONE 4 MG/1
TABLET ORAL
Qty: 21 EACH | Refills: 0 | Status: SHIPPED | OUTPATIENT
Start: 2022-10-18

## 2022-10-18 NOTE — TELEPHONE ENCOUNTER
Sly Kunz seen Dr Jama Burton yesterday, and was diagnosed with foot drop, she spoke with one of her coleagues and he told her he would deal with that issue more aggressively and to have a MRI done due to it is a neurological issue, Please call Sly Kunz at [phone]

## 2022-10-18 NOTE — TELEPHONE ENCOUNTER
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 Although there is not back pain or sciatica indicating lumbar MRI, given patient continues to have paresthesias and foot drop despite home physical therapy, can order MRI. Advised patient that she should check with insurance first before proceeding with MRI in case not covered. Otherwise ok with ordering to evaluate localized neurologic symptoms. Also prescribed medrol dose pack in case there is inflammatory process contributing to nerve issue.      Niyah Burden MD, 10/18/22, 3:50 PM

## 2022-10-19 ENCOUNTER — HOSPITAL ENCOUNTER (OUTPATIENT)
Dept: MRI IMAGING | Age: 42
Discharge: HOME | End: 2022-10-19
Attending: STUDENT IN AN ORGANIZED HEALTH CARE EDUCATION/TRAINING PROGRAM
Payer: COMMERCIAL

## 2022-10-19 ENCOUNTER — TELEPHONE (OUTPATIENT)
Dept: PHYSICAL THERAPY | Facility: HOSPITAL | Age: 42
End: 2022-10-19

## 2022-10-19 ENCOUNTER — TELEPHONE (OUTPATIENT)
Dept: FAMILY MEDICINE CLINIC | Facility: CLINIC | Age: 42
End: 2022-10-19

## 2022-10-19 DIAGNOSIS — M21.372 LEFT FOOT DROP: Primary | ICD-10-CM

## 2022-10-19 DIAGNOSIS — M21.372 LEFT FOOT DROP: ICD-10-CM

## 2022-10-19 PROCEDURE — 73718 MRI LOWER EXTREMITY W/O DYE: CPT | Performed by: STUDENT IN AN ORGANIZED HEALTH CARE EDUCATION/TRAINING PROGRAM

## 2022-10-19 NOTE — TELEPHONE ENCOUNTER
MRI of the foot is negative. I would consider doing an MRI of her lumbar spine if the foot drop does not improve.

## 2022-10-19 NOTE — PROGRESS NOTES
Placing order for MRI lumbar spine for foot drop on left side.      Libby Carrillo MD, 10/19/22, 3:02 PM

## 2022-10-21 ENCOUNTER — OFFICE VISIT (OUTPATIENT)
Dept: PHYSICAL THERAPY | Facility: HOSPITAL | Age: 42
End: 2022-10-21
Attending: STUDENT IN AN ORGANIZED HEALTH CARE EDUCATION/TRAINING PROGRAM
Payer: COMMERCIAL

## 2022-10-21 DIAGNOSIS — M21.372 LEFT FOOT DROP: ICD-10-CM

## 2022-10-21 PROCEDURE — 97162 PT EVAL MOD COMPLEX 30 MIN: CPT

## 2022-10-21 PROCEDURE — 97140 MANUAL THERAPY 1/> REGIONS: CPT

## 2022-10-24 ENCOUNTER — TELEPHONE (OUTPATIENT)
Dept: FAMILY MEDICINE CLINIC | Facility: CLINIC | Age: 42
End: 2022-10-24

## 2022-10-24 NOTE — TELEPHONE ENCOUNTER
Saw PT note, based on PT assessment level of strength about similar to my assessment at recent office visit. Patient does however note symptomatic improvement, almost done with medrol dose pack and using boot as needed when she is particularly mobile. Reminded patient again to have lumbar MRI done that was ordered for her recently (especially since PT assessment of strength has not varied much compared to my assessment previously). Provided Central Scheduling number again. Patient endorses she will have lumbar MRI scheduled.      Bridget Osuna MD, 10/24/22, 8:39 AM

## 2022-10-25 ENCOUNTER — OFFICE VISIT (OUTPATIENT)
Dept: PHYSICAL THERAPY | Facility: HOSPITAL | Age: 42
End: 2022-10-25
Attending: STUDENT IN AN ORGANIZED HEALTH CARE EDUCATION/TRAINING PROGRAM
Payer: COMMERCIAL

## 2022-10-25 PROCEDURE — 97110 THERAPEUTIC EXERCISES: CPT

## 2022-10-25 PROCEDURE — 97140 MANUAL THERAPY 1/> REGIONS: CPT

## 2022-10-25 NOTE — PROGRESS NOTES
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 She notes that the foot is doing the same as last time, she states that her doctor wants her to get MRI of the lumbar spine. The patient notes that she has her MRI scheduled for Friday. She notes that she f fine after the first PT session, after the PT session the tingling subsided for a little bit, then came back that night. She states that her foot is still constantly tingling, but it no longer hurts when touching foot. Pain: 0/10 pain, but tingling      Objective:     Pt is able to raise the foot into DF against gravity through full available rang, PROM is to neutral. PT is unable to lift her great toe against gravity  There is drop foot with ambulation    Eversion 4/5 MMT      Assessment: The patient has had some improvements in her dorsiflexion strength as she is now able to lift the foot against gravity, and eversion strength was improved. The patient does have continued inability to raise the great toe against gravity. Updated HEP to reflect strength changes. Goals:   (to be met in 10 visits)  1. The patient will demonstrate ambulation in the PT clinic with normal mechanics including reduction of dropfoot for short distances  2. The patient will demonstrate the ability to raise her foot into dorsiflexion against gravity  3. The patient will demonstrate at least 4+/5 hip abductor strength bilaterally  4. The patient will demonstrate resolution of positive SLR  5.  The patient will be independent and adherent in a comprehensive HEP    Plan: continue DF strength, ankle stretching into DF  Date: 10/25/2022  TX#: 2/10 Date:                 TX#: 3/ Date:                 TX#: 4/ Date:                 TX#: 5/ Date: Tx#: 6/   Manual therapy  P-A central and unilateral lumbar spine grade III-IV L3-5  STM to tibilialis anterior  x15 min       ther ex  Prone extension 3x, DC due to back pain  LTR 20x  Piriformis stretch 3x30 sec ea  Calf stretch slant board 3x30 sec  DL heel raise 2x10  DF AROM seated long sitting 3x5  Seated HS stretch 3x30 sec ea       X       X       HEP:   Access Code: [email or web address]  URL: [email or web address]  Date: 10/25/2022  Prepared by: Tameka Sotelo    Exercises  Static Prone on Elbows - 1 x daily - 7 x weekly - 1 sets - 8 reps  Standing Lumbar Extension - 1 x daily - 7 x weekly - 1 sets - 8 reps  Seated Calf Stretch with Strap - 1 x daily - 7 x weekly - 1 sets - 3 reps - 30 sec hold  Seated Ankle Dorsiflexion AROM - 1 x daily - 7 x weekly - 3 sets - 6-8 reps      Charges: manual therapy x1, therapeutic exercise x2       Total Timed Treatment: 45 min  Total Treatment Time: 45 min

## 2022-10-28 ENCOUNTER — HOSPITAL ENCOUNTER (OUTPATIENT)
Dept: MRI IMAGING | Facility: HOSPITAL | Age: 42
Discharge: HOME | End: 2022-10-28
Attending: STUDENT IN AN ORGANIZED HEALTH CARE EDUCATION/TRAINING PROGRAM
Payer: COMMERCIAL

## 2022-10-28 DIAGNOSIS — M51.26 LUMBAR DISC HERNIATION: Primary | ICD-10-CM

## 2022-10-28 DIAGNOSIS — M21.372 LEFT FOOT DROP: ICD-10-CM

## 2022-10-28 PROCEDURE — 72148 MRI LUMBAR SPINE W/O DYE: CPT | Performed by: STUDENT IN AN ORGANIZED HEALTH CARE EDUCATION/TRAINING PROGRAM

## 2022-10-28 NOTE — PROGRESS NOTES
Placing neurosurgery referral for recent lumbar MRI findings of disc bulge at L5-S1 region in context of left foot drop.      Emily Dunham MD, 10/28/22, 11:22 AM

## 2022-11-01 ENCOUNTER — OFFICE VISIT (OUTPATIENT)
Dept: PHYSICAL THERAPY | Facility: HOSPITAL | Age: 42
End: 2022-11-01
Attending: STUDENT IN AN ORGANIZED HEALTH CARE EDUCATION/TRAINING PROGRAM
Payer: COMMERCIAL

## 2022-11-01 PROCEDURE — 97140 MANUAL THERAPY 1/> REGIONS: CPT

## 2022-11-01 PROCEDURE — 97110 THERAPEUTIC EXERCISES: CPT

## 2022-11-01 NOTE — PROGRESS NOTES
Diagnosis:   Left foot drop (J05.418)      Referring Provider: Vinicius Groves  Date of Evaluation:    10/21/2022    Precautions:  None Next MD visit:   none scheduled  Date of Surgery: n/a   Insurance Primary/Secondary: [place] / N/A     # Auth Visits: 10 POC            Subjective: The patient reports that she has been doing a lot of walking, and her foot is tired. She states that her back hurt a little after PT last time, her MD gave her the MRI results and said to continue PT. Pain: 0/10 pain, but tingling      Objective:     Pt is able to raise the foot into DF against gravity through full available rang. PT is able to lift her great toe a small range against gravity, limited active range compared to contralateral side. There is drop foot with ambulation. Eversion 4/5 MMT      Dorsiflexion AROM: 2 deg      Assessment: The patient continues to be able to lift her foot against gravity, great toe strength is more limited. Continued with P-A mobilizations in lumbar spine, added long axis traction as well. Added a gastroc stretch against the wall, Gris noted improvements in her calf tightness with this. Goals:   (to be met in 10 visits) PROGRESSING  1. The patient will demonstrate ambulation in the PT clinic with normal mechanics including reduction of dropfoot for short distances  2. The patient will demonstrate the ability to raise her foot into dorsiflexion against gravity  3. The patient will demonstrate at least 4+/5 hip abductor strength bilaterally  4. The patient will demonstrate resolution of positive SLR  5. The patient will be independent and adherent in a comprehensive HEP    Plan: DF yellow TB  Date: 10/25/2022  TX#: 2/10 Date: 11/1/2022          TX#: 3/10 Date:                 TX#: 4/ Date:                 TX#: 5/ Date:    Tx#: 6/   Manual therapy  P-A central and unilateral lumbar spine grade III-IV L3-5  STM to tibilialis anterior  x15 min Manual therapy  P-A central and unilateral lumbar spine grade III-IV L3-5  STM to tibilialis anterior,ankle evertors  Long axis traction belt intermittent 5 min  x25 min      ther ex  Prone extension 3x, DC due to back pain  LTR 20x  Piriformis stretch 3x30 sec ea  Calf stretch slant board 3x30 sec  DL heel raise 2x10  DF AROM seated long sitting 3x5  Seated HS stretch 3x30 sec ea ther ex  Seated DF against gravity 2x10  DL heel raise standing 10x  SL heel raise on L 3x6  Calf stretching slant board 3x30 sec, instructed in stretch against wall 30 sec      X X      X X      HEP:   Access Code: [email or web address]  URL: [email or web address]  Date: 11/02/2022  Prepared by: Jessica Liu    Exercises  Static Prone on Elbows - 1 x daily - 7 x weekly - 1 sets - 8 reps  Standing Lumbar Extension - 1 x daily - 7 x weekly - 1 sets - 8 reps  Seated Calf Stretch with Strap - 1 x daily - 7 x weekly - 1 sets - 3 reps - 30 sec hold  Seated Ankle Dorsiflexion AROM - 1 x daily - 7 x weekly - 3 sets - 6-8 reps  Gastroc Stretch on Wall - 1 x daily - 7 x weekly - 1 sets - 3 reps - 30 sec hold        Charges: manual therapy x2, therapeutic exercise x1       Total Timed Treatment: 45 min  Total Treatment Time: 45 min

## 2022-11-08 ENCOUNTER — OFFICE VISIT (OUTPATIENT)
Dept: PHYSICAL THERAPY | Facility: HOSPITAL | Age: 42
End: 2022-11-08
Attending: STUDENT IN AN ORGANIZED HEALTH CARE EDUCATION/TRAINING PROGRAM
Payer: COMMERCIAL

## 2022-11-08 PROCEDURE — 97110 THERAPEUTIC EXERCISES: CPT

## 2022-11-08 PROCEDURE — 97140 MANUAL THERAPY 1/> REGIONS: CPT

## 2022-11-08 NOTE — PROGRESS NOTES
Diagnosis:   Left foot drop (W81.073)      Referring Provider: Maria Luisa Kuo  Date of Evaluation:    10/21/2022    Precautions:  None Next MD visit:   none scheduled  Date of Surgery: n/a   Insurance Primary/Secondary: [place] / N/A     # Auth Visits: 10 POC            Subjective: The patient reports that she went to the  for the first time this morning, it felt fine and she did mostly upper body. She notes that her foot isn't flopping as much in the AM, her gait is just mildly off. Is feeling it on the muscle when it gets tired. Tingling is still there. Pain: 0/10 pain, but tingling      Objective:     Pt is able to raise the foot into DF against gravity through full available rang. PT is able to lift her great toe a small range against gravity, limited active range compared to contralateral side. There is drop foot with ambulation. Eversion 4/5 MMT      Dorsiflexion AROM/PROM: 2 deg      Assessment: The patient continues to have limited active and passive dorsiflexion range of motion, with reports of pulling in the calf. Added sciatic flossing to improve mobility of the nerves in addition to performing stretches to the musculature. Will have Gris perform this at home. Goals:   (to be met in 10 visits) PROGRESSING  1. The patient will demonstrate ambulation in the PT clinic with normal mechanics including reduction of dropfoot for short distances  2. The patient will demonstrate the ability to raise her foot into dorsiflexion against gravity  3. The patient will demonstrate at least 4+/5 hip abductor strength bilaterally  4. The patient will demonstrate resolution of positive SLR  5. The patient will be independent and adherent in a comprehensive HEP    Plan: DF yellow TB, continue nerve flossing  Date: 10/25/2022  TX#: 2/10 Date: 11/1/2022          TX#: 3/10 Date: 11/8/2022            TX#: 4/10 Date:                 TX#: 5/ Date:    Tx#: 6/   Manual therapy  P-A central and unilateral lumbar spine grade III-IV L3-5  STM to tibilialis anterior  x15 min Manual therapy  P-A central and unilateral lumbar spine grade III-IV L3-5  STM to tibilialis anterior,ankle evertors  Long axis traction belt intermittent 5 min  x25 min Manual therapy  P-A central and unilateral lumbar spine grade III-IV L3-5  Prone long axis traction in L leg. STM to tibilialis anterior, ankle evertors     ther ex  Prone extension 3x, DC due to back pain  LTR 20x  Piriformis stretch 3x30 sec ea  Calf stretch slant board 3x30 sec  DL heel raise 2x10  DF AROM seated long sitting 3x5  Seated HS stretch 3x30 sec ea ther ex  Seated DF against gravity 2x10  DL heel raise standing 10x  SL heel raise on L 3x6  Calf stretching slant board 3x30 sec, instructed in stretch against wall 30 sec ther ex  Sciatic flossing 2x10  Gastroc stretching on slant board 3x30 sec  Resisted dorsiflexion yellow theraband 2x8  Resisted eversion yellow TB 2x8     X X      X X      HEP:   Access Code: [email or web address]  URL: [email or web address]  Date: 11/02/2022  Prepared by: Clinton La    Exercises  Static Prone on Elbows - 1 x daily - 7 x weekly - 1 sets - 8 reps  Standing Lumbar Extension - 1 x daily - 7 x weekly - 1 sets - 8 reps  Seated Calf Stretch with Strap - 1 x daily - 7 x weekly - 1 sets - 3 reps - 30 sec hold  Seated Ankle Dorsiflexion AROM - 1 x daily - 7 x weekly - 3 sets - 6-8 reps  Gastroc Stretch on Wall - 1 x daily - 7 x weekly - 1 sets - 3 reps - 30 sec hold        Charges: manual therapy x2, therapeutic exercise x1       Total Timed Treatment: 45 min  Total Treatment Time: 45 min

## 2022-11-11 ENCOUNTER — OFFICE VISIT (OUTPATIENT)
Dept: PHYSICAL THERAPY | Facility: HOSPITAL | Age: 42
End: 2022-11-11
Attending: FAMILY MEDICINE
Payer: COMMERCIAL

## 2022-11-11 PROCEDURE — 97110 THERAPEUTIC EXERCISES: CPT

## 2022-11-11 PROCEDURE — 97140 MANUAL THERAPY 1/> REGIONS: CPT

## 2022-11-11 NOTE — PROGRESS NOTES
Diagnosis:   Left foot drop (M21.372)      Referring Provider: No ref. provider found  Date of Evaluation:    10/21/2022    Precautions:  None Next MD visit:   none scheduled  Date of Surgery: n/a   Insurance Primary/Secondary: [place] / N/A     # Auth Visits: 10 POC            Subjective: The patient reports that it felt like yesteday and today her foot got tired faster, moreso today. Pain: 0/10 pain, but tingling with palpation to top of foot      Objective:     Pt is able to raise the foot into DF against gravity through full available rang. PT is able to lift her great toe a small range against gravity, limited active range compared to contralateral side. There is drop foot with ambulation. Hip abduction 4+/5    Eversion 4/5 MMT    Dorsiflexion AROM/PROM: 2 deg      Assessment: Pt has full active range of dorsiflexion equal to her passive range, which is limited. Added soft tissue mobilization to the calf in addition to the anterior tibialis and evertors today to address this. Hip abductor strength has improved at this time. Goals:   (to be met in 10 visits) PROGRESSING  1. The patient will demonstrate ambulation in the PT clinic with normal mechanics including reduction of dropfoot for short distances  2. The patient will demonstrate the ability to raise her foot into dorsiflexion against gravity MET  3. The patient will demonstrate at least 4+/5 hip abductor strength bilaterally MET  4. The patient will demonstrate resolution of positive SLR  5. The patient will be independent and adherent in a comprehensive HEP    Plan: tilt board  Date: 10/25/2022  TX#: 2/10 Date: 11/1/2022          TX#: 3/10 Date: 11/8/2022            TX#: 4/10 Date: 11/14/2022          TX#: 5/10 Date:    Tx#: 6/   Manual therapy  P-A central and unilateral lumbar spine grade III-IV L3-5  STM to tibilialis anterior  x15 min Manual therapy  P-A central and unilateral lumbar spine grade III-IV L3-5  STM to tibilialis anterior,ankle evertors  Long axis traction belt intermittent 5 min  x25 min Manual therapy  P-A central and unilateral lumbar spine grade III-IV L3-5  Prone long axis traction in L leg. STM to tibilialis anterior, ankle evertors Manual therapy  P-A central and unilateral lumbar spine grade III-IV L3-5  STM to tibilialis anterior, ankle evertors, calf    ther ex  Prone extension 3x, DC due to back pain  LTR 20x  Piriformis stretch 3x30 sec ea  Calf stretch slant board 3x30 sec  DL heel raise 2x10  DF AROM seated long sitting 3x5  Seated HS stretch 3x30 sec ea ther ex  Seated DF against gravity 2x10  DL heel raise standing 10x  SL heel raise on L 3x6  Calf stretching slant board 3x30 sec, instructed in stretch against wall 30 sec ther ex  Sciatic flossing 2x10  Gastroc stretching on slant board 3x30 sec  Resisted dorsiflexion yellow theraband 2x8  Resisted eversion yellow TB 2x8 ther ex  Sciatic tensioner 15x  Calf stretching slant board 3x30 sec  Yellow TB eversion, dorsiflexion 3x8 ea    X X X X    X X X X    HEP:   Access Code: [email or web address]  URL: [email or web address]  Date: 11/02/2022  Prepared by: Juan May    Exercises  Static Prone on Elbows - 1 x daily - 7 x weekly - 1 sets - 8 reps  Standing Lumbar Extension - 1 x daily - 7 x weekly - 1 sets - 8 reps  Seated Calf Stretch with Strap - 1 x daily - 7 x weekly - 1 sets - 3 reps - 30 sec hold  Seated Ankle Dorsiflexion AROM - 1 x daily - 7 x weekly - 3 sets - 6-8 reps  Gastroc Stretch on Wall - 1 x daily - 7 x weekly - 1 sets - 3 reps - 30 sec hold        Charges: manual therapy x2, therapeutic exercise x1       Total Timed Treatment: 45 min  Total Treatment Time: 45 min

## 2022-11-15 ENCOUNTER — OFFICE VISIT (OUTPATIENT)
Dept: PHYSICAL THERAPY | Facility: HOSPITAL | Age: 42
End: 2022-11-15
Attending: STUDENT IN AN ORGANIZED HEALTH CARE EDUCATION/TRAINING PROGRAM
Payer: COMMERCIAL

## 2022-11-15 PROCEDURE — 97140 MANUAL THERAPY 1/> REGIONS: CPT

## 2022-11-15 PROCEDURE — 97110 THERAPEUTIC EXERCISES: CPT

## 2022-11-15 NOTE — PROGRESS NOTES
Diagnosis:   Left foot drop (D52.781)      Referring Provider: Ofe Dunne  Date of Evaluation:    10/21/2022    Precautions:  None Next MD visit:   none scheduled  Date of Surgery: n/a   Insurance Primary/Secondary: [employer] P[place] / N/A     # Auth Visits: 10 POC            Subjective: The patient reports that she is no longer getting tired earlier in the day. She states that she used a massager on her leg/shin twice, and was sore the next day. Pain: 0/10 pain, but tingling with palpation to top of foot      Objective:     Pt is able to raise the foot into DF against gravity through full available rang. PT is able to lift her great toe a small range against gravity, limited active range compared to contralateral side. There is drop foot with ambulation. Pt is unable to hold in dorsiflexion with heel walking    Hip abduction 4+/5    Eversion 4/5 MMT    Dorsiflexion AROM/PROM: 2 deg with knee extended      Assessment: The patient continues to have limited flexibility into dorsiflexion, therefore performed soft tissue mobilization to address this, and performed sciatic flossing as well as stretching. The patient had difficulty maintaining dorsiflexed position with heel walking. Will continue to work on building strength and improve flexibility. Goals:   (to be met in 10 visits) PROGRESSING  1. The patient will demonstrate ambulation in the PT clinic with normal mechanics including reduction of dropfoot for short distances  2. The patient will demonstrate the ability to raise her foot into dorsiflexion against gravity MET  3. The patient will demonstrate at least 4+/5 hip abductor strength bilaterally MET  4. The patient will demonstrate resolution of positive SLR  5.  The patient will be independent and adherent in a comprehensive HEP    Plan: tilt board, consider e-stim  Date: 10/25/2022  TX#: 2/10 Date: 11/1/2022          TX#: 3/10 Date: 11/8/2022            TX#: 4/10 Date: 11/14/2022          TX#: 5/10 Date: 11/15/2022   Tx#: 6/10   Manual therapy  P-A central and unilateral lumbar spine grade III-IV L3-5  STM to tibilialis anterior  x15 min Manual therapy  P-A central and unilateral lumbar spine grade III-IV L3-5  STM to tibilialis anterior,ankle evertors  Long axis traction belt intermittent 5 min  x25 min Manual therapy  P-A central and unilateral lumbar spine grade III-IV L3-5  Prone long axis traction in L leg. STM to tibilialis anterior, ankle evertors Manual therapy  P-A central and unilateral lumbar spine grade III-IV L3-5  STM to tibilialis anterior, ankle evertors, calf Manual therapy  P-A central and unilateral lumbar spine grade III-IV L3-5  STM to calf, anterior tibialis   ther ex  Prone extension 3x, DC due to back pain  LTR 20x  Piriformis stretch 3x30 sec ea  Calf stretch slant board 3x30 sec  DL heel raise 2x10  DF AROM seated long sitting 3x5  Seated HS stretch 3x30 sec ea ther ex  Seated DF against gravity 2x10  DL heel raise standing 10x  SL heel raise on L 3x6  Calf stretching slant board 3x30 sec, instructed in stretch against wall 30 sec ther ex  Sciatic flossing 2x10  Gastroc stretching on slant board 3x30 sec  Resisted dorsiflexion yellow theraband 2x8  Resisted eversion yellow TB 2x8 ther ex  Sciatic tensioner 15x  Calf stretching slant board 3x30 sec  Yellow TB eversion, dorsiflexion 3x8 ea ther ex  HS stretch strap 3x30 sec  Sciatic tensioner 15x  Calf stretching 3x30 sec ea  Heel raise/toe raise 2x10   X X X X X   X X X X X   HEP:   Access Code: [email or web address]  URL: [email or web address]  Date: 11/02/2022  Prepared by: Niki Cabral    Exercises  Static Prone on Elbows - 1 x daily - 7 x weekly - 1 sets - 8 reps  Standing Lumbar Extension - 1 x daily - 7 x weekly - 1 sets - 8 reps  Seated Calf Stretch with Strap - 1 x daily - 7 x weekly - 1 sets - 3 reps - 30 sec hold  Seated Ankle Dorsiflexion AROM - 1 x daily - 7 x weekly - 3 sets - 6-8 reps  Gastroc Stretch on Wall - 1 x daily - 7 x weekly - 1 sets - 3 reps - 30 sec hold        Charges: manual therapy x1, therapeutic exercise x2       Total Timed Treatment: 45 min  Total Treatment Time: 45 min

## 2022-11-17 ENCOUNTER — OFFICE VISIT (OUTPATIENT)
Dept: SURGERY | Facility: CLINIC | Age: 42
End: 2022-11-17
Payer: COMMERCIAL

## 2022-11-17 VITALS
HEIGHT: 64.5 IN | HEART RATE: 86 BPM | OXYGEN SATURATION: 99 % | DIASTOLIC BLOOD PRESSURE: 70 MMHG | WEIGHT: 174 LBS | BODY MASS INDEX: 29.35 KG/M2 | SYSTOLIC BLOOD PRESSURE: 122 MMHG

## 2022-11-17 DIAGNOSIS — M21.372 FOOT DROP, LEFT: Primary | ICD-10-CM

## 2022-11-17 PROCEDURE — 3078F DIAST BP <80 MM HG: CPT | Performed by: NEUROLOGICAL SURGERY

## 2022-11-17 PROCEDURE — 3008F BODY MASS INDEX DOCD: CPT | Performed by: NEUROLOGICAL SURGERY

## 2022-11-17 PROCEDURE — 99204 OFFICE O/P NEW MOD 45 MIN: CPT | Performed by: NEUROLOGICAL SURGERY

## 2022-11-17 PROCEDURE — 3074F SYST BP LT 130 MM HG: CPT | Performed by: NEUROLOGICAL SURGERY

## 2022-11-21 ENCOUNTER — OFFICE VISIT (OUTPATIENT)
Dept: PHYSICAL THERAPY | Facility: HOSPITAL | Age: 42
End: 2022-11-21
Attending: STUDENT IN AN ORGANIZED HEALTH CARE EDUCATION/TRAINING PROGRAM
Payer: COMMERCIAL

## 2022-11-21 PROCEDURE — 97112 NEUROMUSCULAR REEDUCATION: CPT

## 2022-11-21 PROCEDURE — 97140 MANUAL THERAPY 1/> REGIONS: CPT

## 2022-11-21 NOTE — PROGRESS NOTES
Diagnosis:   Left foot drop (S68.984)      Referring Provider: Sandra Suárez  Date of Evaluation:    10/21/2022    Precautions:  None Next MD visit:   none scheduled  Date of Surgery: n/a   Insurance Primary/Secondary: [place] P[place] / N/A     # Auth Visits: 10 POC            Subjective: The patient reports that she saw a neurosurgeon, she is referred to a neurologist, he wants MS to be ruled out. She states that her foot is still tingling at the top, it feels like it is asleep. The patient reports that she feels like she moves better, her gait is better. Pt still has discomfort on muscles on side of leg. Pain: 0/10 pain, but tingling with palpation to top of foot      Objective:     Pt is able to raise the foot into DF against gravity through full available rang. PT is able to lift her great toe a small range against gravity, limited active range compared to contralateral side. There is drop foot with ambulation. Pt is able to hold dorsiflexion partial range with heel walking with use of NMES. Hip abduction 4+/5    Eversion 4/5 MMT    Dorsiflexion AROM/PROM: 2 deg with knee extended      Assessment: Added NMES to further improve muscular activation into dorsiflexion. The patient had improved ability to perform dorsiflexion against gravity with the heel walking. The patient does continue to report relief of tightness with manual therapy to the lower lumbar spine. Goals:   (to be met in 10 visits) PROGRESSING  1. The patient will demonstrate ambulation in the PT clinic with normal mechanics including reduction of dropfoot for short distances  2. The patient will demonstrate the ability to raise her foot into dorsiflexion against gravity MET  3. The patient will demonstrate at least 4+/5 hip abductor strength bilaterally MET  4. The patient will demonstrate resolution of positive SLR  5.  The patient will be independent and adherent in a comprehensive HEP    Plan: continue NMES  Date: 10/25/2022  TX#: 2/10 Date: 11/1/2022          TX#: 3/10 Date: 11/8/2022            TX#: 4/10 Date: 11/11/2022          TX#: 5/10 Date: 11/15/2022   Tx#: 6/10 Date: 11/21/2022   Tx#: 7/10   Manual therapy  P-A central and unilateral lumbar spine grade III-IV L3-5  STM to tibilialis anterior  x15 min Manual therapy  P-A central and unilateral lumbar spine grade III-IV L3-5  STM to tibilialis anterior,ankle evertors  Long axis traction belt intermittent 5 min  x25 min Manual therapy  P-A central and unilateral lumbar spine grade III-IV L3-5  Prone long axis traction in L leg. STM to tibilialis anterior, ankle evertors Manual therapy  P-A central and unilateral lumbar spine grade III-IV L3-5  STM to tibilialis anterior, ankle evertors, calf Manual therapy  P-A central and unilateral lumbar spine grade III-IV L3-5  STM to calf, anterior tibialis Manual therapy  P-A central and unilateral lumbar spine grade III-IV L3-5  STM to evertors, anterior tibialis  x25 min   ther ex  Prone extension 3x, DC due to back pain  LTR 20x  Piriformis stretch 3x30 sec ea  Calf stretch slant board 3x30 sec  DL heel raise 2x10  DF AROM seated long sitting 3x5  Seated HS stretch 3x30 sec ea ther ex  Seated DF against gravity 2x10  DL heel raise standing 10x  SL heel raise on L 3x6  Calf stretching slant board 3x30 sec, instructed in stretch against wall 30 sec ther ex  Sciatic flossing 2x10  Gastroc stretching on slant board 3x30 sec  Resisted dorsiflexion yellow theraband 2x8  Resisted eversion yellow TB 2x8 ther ex  Sciatic tensioner 15x  Calf stretching slant board 3x30 sec  Yellow TB eversion, dorsiflexion 3x8 ea ther ex  HS stretch strap 3x30 sec  Sciatic tensioner 15x  Calf stretching 3x30 sec ea  Heel raise/toe raise 2x10 ther ex  Sciatic tensioner 15x  Calf stretching 3x30 sec ea  <8 min, unbilled   X X X X X Neuro re-ed  NMES 5 sec on, 10 sec off, custom parameters, on anterior tibialis 10 min. Performed AROM into dorsiflexion, and walking on heels.     X X X X X X   HEP:   Access Code: [email or web address]  URL: [email or web address]  Date: 11/02/2022  Prepared by: Jessica Liu    Exercises  Static Prone on Elbows - 1 x daily - 7 x weekly - 1 sets - 8 reps  Standing Lumbar Extension - 1 x daily - 7 x weekly - 1 sets - 8 reps  Seated Calf Stretch with Strap - 1 x daily - 7 x weekly - 1 sets - 3 reps - 30 sec hold  Seated Ankle Dorsiflexion AROM - 1 x daily - 7 x weekly - 3 sets - 6-8 reps  Gastroc Stretch on Wall - 1 x daily - 7 x weekly - 1 sets - 3 reps - 30 sec hold        Charges: manual therapy x2, therapeutic exercise x0, neuro re-ed x1       Total Timed Treatment: 45 min  Total Treatment Time: 45 min

## 2022-11-23 ENCOUNTER — APPOINTMENT (OUTPATIENT)
Dept: PHYSICAL THERAPY | Facility: HOSPITAL | Age: 42
End: 2022-11-23
Attending: STUDENT IN AN ORGANIZED HEALTH CARE EDUCATION/TRAINING PROGRAM
Payer: COMMERCIAL

## 2022-11-28 ENCOUNTER — OFFICE VISIT (OUTPATIENT)
Dept: PHYSICAL THERAPY | Facility: HOSPITAL | Age: 42
End: 2022-11-28
Attending: STUDENT IN AN ORGANIZED HEALTH CARE EDUCATION/TRAINING PROGRAM
Payer: COMMERCIAL

## 2022-11-28 PROCEDURE — 97112 NEUROMUSCULAR REEDUCATION: CPT

## 2022-11-28 PROCEDURE — 97140 MANUAL THERAPY 1/> REGIONS: CPT

## 2022-11-28 PROCEDURE — 97110 THERAPEUTIC EXERCISES: CPT

## 2022-11-28 NOTE — PROGRESS NOTES
Diagnosis:   Left foot drop (N59.095)      Referring Provider: Marisol Pearson  Date of Evaluation:    10/21/2022    Precautions:  None Next MD visit:   none scheduled  Date of Surgery: n/a   Insurance Primary/Secondary: [place] / N/A     # Auth Visits: 10 POC            Subjective: The patient states that her gait is better and she is getting better. Pain: 0/10 pain, but tingling with palpation to top of foot      Objective:     Pt is able to raise the foot into DF against gravity through full available rang. PT is able to lift her great toe a small range against gravity, limited active range compared to contralateral side. There is no drop foot noted with ambulation in clinic during today's AM appointment. Pt is able to hold dorsiflexion partial range with heel walking with use of NMES. Hip abduction 4+/5    Eversion 4/5 MMT    Dorsiflexion AROM/PROM: 4 deg with knee extended      Assessment: ROM into DF with knee extended is slightly improved from prior session, overall it is much better, reaching 4 deg today. Patient reports continued tightness in the L leg/calf, encouraged her to continue with neural flossing at home. Noted fatigue with the NMES performed today. Goals:   (to be met in 10 visits) PROGRESSING  1. The patient will demonstrate ambulation in the PT clinic with normal mechanics including reduction of dropfoot for short distances  2. The patient will demonstrate the ability to raise her foot into dorsiflexion against gravity MET  3. The patient will demonstrate at least 4+/5 hip abductor strength bilaterally MET  4. The patient will demonstrate resolution of positive SLR  5.  The patient will be independent and adherent in a comprehensive HEP    Plan: continue NMES, neural flossing  Date: 11/8/2022            TX#: 4/10 Date: 11/11/2022          TX#: 5/10 Date: 11/15/2022   Tx#: 6/10 Date: 11/21/2022   Tx#: 7/10 Date: 11/28/2022   Tx#: 8/10   Manual therapy  P-A central and unilateral lumbar spine grade III-IV L3-5  Prone long axis traction in L leg. STM to tibilialis anterior, ankle evertors Manual therapy  P-A central and unilateral lumbar spine grade III-IV L3-5  STM to tibilialis anterior, ankle evertors, calf Manual therapy  P-A central and unilateral lumbar spine grade III-IV L3-5  STM to calf, anterior tibialis Manual therapy  P-A central and unilateral lumbar spine grade III-IV L3-5  STM to evertors, anterior tibialis  x25 min Manual therapy  P-A central and unilateral lumbar spine grade III-IV L3-5  STM to evertors, anterior tibialis   ther ex  Sciatic flossing 2x10  Gastroc stretching on slant board 3x30 sec  Resisted dorsiflexion yellow theraband 2x8  Resisted eversion yellow TB 2x8 ther ex  Sciatic tensioner 15x  Calf stretching slant board 3x30 sec  Yellow TB eversion, dorsiflexion 3x8 ea ther ex  HS stretch strap 3x30 sec  Sciatic tensioner 15x  Calf stretching 3x30 sec ea  Heel raise/toe raise 2x10 ther ex  Sciatic tensioner 15x  Calf stretching 3x30 sec ea  <8 min, unbilled ther ex  Sciatic tensioner 15x  Hamstring stretching 3x30 sec ea  Calf stretching 3x30 sec ea     X X X Neuro re-ed  NMES 5 sec on, 10 sec off, custom parameters, on anterior tibialis 10 min. Performed AROM into dorsiflexion, and walking on heels. Neuro re-ed  NMES 5 sec on, 8 sec off, custom parameters, level 33, anterior tibialis 10 min. AROM into dorsiflexion, walking on heels   X X X X X   HEP:   Access Code: [email or web address]  URL: [email or web address]  Date: 11/02/2022  Prepared by: Mario Seat    Exercises  Static Prone on Elbows - 1 x daily - 7 x weekly - 1 sets - 8 reps  Standing Lumbar Extension - 1 x daily - 7 x weekly - 1 sets - 8 reps  Seated Calf Stretch with Strap - 1 x daily - 7 x weekly - 1 sets - 3 reps - 30 sec hold  Seated Ankle Dorsiflexion AROM - 1 x daily - 7 x weekly - 3 sets - 6-8 reps  Gastroc Stretch on Wall - 1 x daily - 7 x weekly - 1 sets - 3 reps - 30 sec hold        Charges: manual therapy x1, therapeutic exercise x1, neuro re-ed x1       Total Timed Treatment: 45 min  Total Treatment Time: 45 min

## 2022-11-30 ENCOUNTER — OFFICE VISIT (OUTPATIENT)
Dept: PHYSICAL THERAPY | Facility: HOSPITAL | Age: 42
End: 2022-11-30
Attending: STUDENT IN AN ORGANIZED HEALTH CARE EDUCATION/TRAINING PROGRAM
Payer: COMMERCIAL

## 2022-11-30 PROCEDURE — 97112 NEUROMUSCULAR REEDUCATION: CPT

## 2022-11-30 PROCEDURE — 97110 THERAPEUTIC EXERCISES: CPT

## 2022-11-30 PROCEDURE — 97140 MANUAL THERAPY 1/> REGIONS: CPT

## 2022-11-30 NOTE — PROGRESS NOTES
Diagnosis:   Left foot drop (P69.795)      Referring Provider: Manda Quarles  Date of Evaluation:    10/21/2022    Precautions:  None Next MD visit:   none scheduled  Date of Surgery: n/a   Insurance Primary/Secondary: [place] / N/A     # Auth Visits: 10 POC            Subjective: The patient reports that she feels like her gait is constantly improving. She states that she hasn't tripped over her foot in over 1 week. Pain: 0/10 pain, but tingling with palpation to top of foot      Objective:     Pt is able to raise the foot into DF against gravity through full available rang. PT is able to lift her great toe a small range against gravity, limited active range compared to contralateral side. There is no drop foot noted with ambulation in clinic during today's AM appointment. Pt is able to hold dorsiflexion partial range with heel walking with use of NMES. Hip abduction 4+/5    Dorsiflexion 4+/5 MMT    Dorsiflexion AROM/PROM: 4 deg with knee extended      Assessment: Continuing to progress strength as able. The patient has improvements in her strength into dorsiflexion, however strength has improved. She was able to bring the foot up into partial dorsiflexion with the heel walking. Lynn Ha has noted improved strength since starting the NMES. Goals:   (to be met in 10 visits) PROGRESSING  1. The patient will demonstrate ambulation in the PT clinic with normal mechanics including reduction of dropfoot for short distances  2. The patient will demonstrate the ability to raise her foot into dorsiflexion against gravity MET  3. The patient will demonstrate at least 4+/5 hip abductor strength bilaterally MET  4. The patient will demonstrate resolution of positive SLR  5.  The patient will be independent and adherent in a comprehensive HEP    Plan: seated slump, consider neural flossing  Date: 11/8/2022            TX#: 4/10 Date: 11/11/2022          TX#: 5/10 Date: 11/15/2022   Tx#: 6/10 Date: 11/21/2022   Tx#: 7/10 Date: 11/28/2022   Tx#: 8/10 Date: 11/30/2022   Tx#: 9/10   Manual therapy  P-A central and unilateral lumbar spine grade III-IV L3-5  Prone long axis traction in L leg. STM to tibilialis anterior, ankle evertors Manual therapy  P-A central and unilateral lumbar spine grade III-IV L3-5  STM to tibilialis anterior, ankle evertors, calf Manual therapy  P-A central and unilateral lumbar spine grade III-IV L3-5  STM to calf, anterior tibialis Manual therapy  P-A central and unilateral lumbar spine grade III-IV L3-5  STM to evertors, anterior tibialis  x25 min Manual therapy  P-A central and unilateral lumbar spine grade III-IV L3-5  STM to evertors, anterior tibialis Manual therapy  P-A central and unilateral lumbar spine grade III-IV L3-5  STM to evertors, anterior tibialis   ther ex  Sciatic flossing 2x10  Gastroc stretching on slant board 3x30 sec  Resisted dorsiflexion yellow theraband 2x8  Resisted eversion yellow TB 2x8 ther ex  Sciatic tensioner 15x  Calf stretching slant board 3x30 sec  Yellow TB eversion, dorsiflexion 3x8 ea ther ex  HS stretch strap 3x30 sec  Sciatic tensioner 15x  Calf stretching 3x30 sec ea  Heel raise/toe raise 2x10 ther ex  Sciatic tensioner 15x  Calf stretching 3x30 sec ea  <8 min, unbilled ther ex  Sciatic tensioner 15x  Hamstring stretching 3x30 sec ea  Calf stretching 3x30 sec ea   Ther ex  Sciatic tensioner 15x  Hamstring stretching 3x30 sec ea  Calf stretching 3x30 sec ea   X X X Neuro re-ed  NMES 5 sec on, 10 sec off, custom parameters, on anterior tibialis 10 min. Performed AROM into dorsiflexion, and walking on heels. Neuro re-ed  NMES 5 sec on, 8 sec off, custom parameters, level 33, anterior tibialis 10 min. AROM into dorsiflexion, walking on heels Neuro re-ed  NMES 5 sec on, 8 sec off, custom parameters, level 33, anterior tibialis 10 min.  Resisted dorsiflexion yellow TB, walking on heels   X X X X X X   HEP:   Access Code: [email or web address]  URL: [email or web address]  Date: 11/02/2022  Prepared by: Josef Burgos    Exercises  Static Prone on Elbows - 1 x daily - 7 x weekly - 1 sets - 8 reps  Standing Lumbar Extension - 1 x daily - 7 x weekly - 1 sets - 8 reps  Seated Calf Stretch with Strap - 1 x daily - 7 x weekly - 1 sets - 3 reps - 30 sec hold  Seated Ankle Dorsiflexion AROM - 1 x daily - 7 x weekly - 3 sets - 6-8 reps  Gastroc Stretch on Wall - 1 x daily - 7 x weekly - 1 sets - 3 reps - 30 sec hold        Charges: manual therapy x1, therapeutic exercise x1, neuro re-ed x1       Total Timed Treatment: 45 min  Total Treatment Time: 45 min

## 2022-12-05 ENCOUNTER — OFFICE VISIT (OUTPATIENT)
Dept: PHYSICAL THERAPY | Facility: HOSPITAL | Age: 42
End: 2022-12-05
Attending: STUDENT IN AN ORGANIZED HEALTH CARE EDUCATION/TRAINING PROGRAM
Payer: COMMERCIAL

## 2022-12-05 PROCEDURE — 97140 MANUAL THERAPY 1/> REGIONS: CPT

## 2022-12-05 PROCEDURE — 97110 THERAPEUTIC EXERCISES: CPT

## 2022-12-05 NOTE — PROGRESS NOTES
Diagnosis:   Left foot drop (R74.659)      Referring Provider: Elizabeth Colunga  Date of Evaluation:    10/21/2022    Precautions:  None Next MD visit:   Neurology in January  Date of Surgery: n/a   Insurance Primary/Secondary: [employer] / N/A     # Auth Visits: 10 POC           Progress Summary  Pt has attended 10 visits in Physical Therapy. Subjective: The patient reports that she feels like it is still improving, she has felt better since starting the NMES. She reports that she isn't seeing neurologist until January. Neena Toure reports a 70% improvement since starting physical therapy. Pain: 0/10 pain      Objective:     Pt is able to raise the foot into DF against gravity through full available rang. PT is able to lift her great toe a small range against gravity, limited active range compared to contralateral side. There is no drop foot noted with ambulation in clinic during today's AM appointment. Pt is able to hold dorsiflexion partial range with heel walking. Hip abduction 4+/5    Dorsiflexion 5/5 MMT  Eversion MMT 5/5    Dorsiflexion AROM/PROM: 4 deg with knee extended, 6 with knee flexed    SLR L LE 60 (was 45 at eval)      Assessment: Neena Toure has demonstrated improvements in her range of motion for active range of motion into dorsiflexion and active great toe extension as well as passive dorsiflexion. Noted improvements in neural tension as well with straight leg raise assessment. The patient is improving and continues to note relief with P-A mobilizations to the lumbar spine. Due to persistent functional strength deficits, she requires continued physical therapy at a frequency of 1x/week for up to 4 additional sessions. Goals:   (to be met in 14 visits) PROGRESSING  1. The patient will demonstrate ambulation in the PT clinic with normal mechanics including reduction of dropfoot for short distances MET  2.  The patient will demonstrate the ability to raise her foot into dorsiflexion against gravity MET  3. The patient will demonstrate at least 4+/5 hip abductor strength bilaterally MET  4. The patient will demonstrate resolution of positive SLR PROGRESSING  5. The patient will be independent and adherent in a comprehensive HEP PROGRESSING      Plan: Continue skilled Physical Therapy 1 x/week or a total of 4 visits over a 90 day period. Treatment will include: manual therapy, therapeutic exercise, neuromuscular re-education, therapeutic activity       Patient/Family/Caregiver was advised of these findings, precautions, and treatment options and has agreed to actively participate in planning and for this course of care. Thank you for your referral. If you have any questions, please contact me at Dept: [phone].     Sincerely,  Electronically signed by therapist: Perla Schmidt, PT       Certification From: 78/8/8306  To:3/5/2023       Plan: seated slump, consider neural flossing  Date: 11/15/2022   Tx#: 6/10 Date: 11/21/2022   Tx#: 7/10 Date: 11/28/2022   Tx#: 8/10 Date: 11/30/2022   Tx#: 9/10 Date: 12/5/2022   Tx#: 10/14   Manual therapy  P-A central and unilateral lumbar spine grade III-IV L3-5  STM to calf, anterior tibialis Manual therapy  P-A central and unilateral lumbar spine grade III-IV L3-5  STM to evertors, anterior tibialis  x25 min Manual therapy  P-A central and unilateral lumbar spine grade III-IV L3-5  STM to evertors, anterior tibialis Manual therapy  P-A central and unilateral lumbar spine grade III-IV L3-5  STM to evertors, anterior tibialis Manual therapy  P-A central and unilateral lumbar spine grade III-IV L3-5   ther ex  HS stretch strap 3x30 sec  Sciatic tensioner 15x  Calf stretching 3x30 sec ea  Heel raise/toe raise 2x10 ther ex  Sciatic tensioner 15x  Calf stretching 3x30 sec ea  <8 min, unbilled ther ex  Sciatic tensioner 15x  Hamstring stretching 3x30 sec ea  Calf stretching 3x30 sec ea   Ther ex  Sciatic tensioner 15x  Hamstring stretching 3x30 sec ea  Calf stretching 3x30 sec ea Ther ex  Seated slump test, slump sliders/tensioners  Seated toe yoga 10x  Lunge on 6\" box stretch 6x ea  FSU 4\" box 10x on L  Calf stretch slant board 3x30 sec   X Neuro re-ed  NMES 5 sec on, 10 sec off, custom parameters, on anterior tibialis 10 min. Performed AROM into dorsiflexion, and walking on heels. Neuro re-ed  NMES 5 sec on, 8 sec off, custom parameters, level 33, anterior tibialis 10 min. AROM into dorsiflexion, walking on heels Neuro re-ed  NMES 5 sec on, 8 sec off, custom parameters, level 33, anterior tibialis 10 min. Resisted dorsiflexion yellow TB, walking on heels X   X X X X X   HEP:   Access Code: [email or web address]  URL: [email or web address]  Date: 12/05/2022  Prepared by: Miranda Scheuermann    Exercises  Static Prone on Elbows - 1 x daily - 7 x weekly - 1 sets - 8 reps  Standing Lumbar Extension - 1 x daily - 7 x weekly - 1 sets - 8 reps  Seated Calf Stretch with Strap - 1 x daily - 7 x weekly - 1 sets - 3 reps - 30 sec hold  Seated Ankle Dorsiflexion AROM - 1 x daily - 7 x weekly - 3 sets - 6-8 reps  Gastroc Stretch on Wall - 1 x daily - 7 x weekly - 1 sets - 3 reps - 30 sec hold  Seated Sciatic Tensioner - 1 x daily - 7 x weekly - 1 sets - 10 reps  Seated Great Toe Extension - 1 x daily - 7 x weekly - 1 sets - 10 reps  Seated Lesser Toes Extension - 1 x daily - 7 x weekly - 1 sets - 10 reps        Charges: manual therapy x1, therapeutic exercise x2      Total Timed Treatment: 40 min  Total Treatment Time: 40 min

## 2022-12-09 DIAGNOSIS — E66.9 OBESITY (BMI 30-39.9): ICD-10-CM

## 2022-12-09 DIAGNOSIS — Z51.81 ENCOUNTER FOR THERAPEUTIC DRUG MONITORING: ICD-10-CM

## 2022-12-09 NOTE — TELEPHONE ENCOUNTER
Requesting Wegovy 2.4 mg  LOV: 10/10/22  RTC: 3 months  Last Relevant Labs: na  Filled: 9/23/22 #9ml with 0 refills    My chart sent to patient to schedule for January

## 2022-12-13 ENCOUNTER — APPOINTMENT (OUTPATIENT)
Dept: PHYSICAL THERAPY | Facility: HOSPITAL | Age: 42
End: 2022-12-13
Attending: STUDENT IN AN ORGANIZED HEALTH CARE EDUCATION/TRAINING PROGRAM
Payer: COMMERCIAL

## 2022-12-13 RX ORDER — SEMAGLUTIDE 2.4 MG/.75ML
INJECTION, SOLUTION SUBCUTANEOUS
Qty: 9 ML | Refills: 0 | Status: SHIPPED | OUTPATIENT
Start: 2022-12-13

## 2022-12-16 ENCOUNTER — OFFICE VISIT (OUTPATIENT)
Dept: PHYSICAL THERAPY | Facility: HOSPITAL | Age: 42
End: 2022-12-16
Attending: STUDENT IN AN ORGANIZED HEALTH CARE EDUCATION/TRAINING PROGRAM
Payer: COMMERCIAL

## 2022-12-16 PROCEDURE — 97110 THERAPEUTIC EXERCISES: CPT

## 2022-12-16 PROCEDURE — 97140 MANUAL THERAPY 1/> REGIONS: CPT

## 2022-12-16 PROCEDURE — 97014 ELECTRIC STIMULATION THERAPY: CPT

## 2022-12-16 NOTE — PROGRESS NOTES
Diagnosis:   Left foot drop (M21.372)      Referring Provider: No ref. provider found  Date of Evaluation:    10/21/2022    Precautions:  None Next MD visit:   Neurology in January  Date of Surgery: n/a   Insurance Primary/Secondary: [employer] [place] P[place] / N/A     # Auth Visits: 10 POC            Subjective: The patient states that she tripped up the stairs the other day, she had stuff in her hands, not sure if it was due to the foot. She notes that she is not having her drop foot symptoms lately. Pain: 0/10 pain      Objective:     Pt is able to raise the foot into DF against gravity through full available rang. Pt is able to take resistance to the great toe into extension. There is no drop foot noted with ambulation in clinic during today's AM appointment. Pt is able to hold dorsiflexion partial range with heel walking. Hip abduction 4+/5    Dorsiflexion 5/5 MMT  Eversion MMT 5/5    Dorsiflexion AROM/PROM: 4 deg with knee extended, 6 with knee flexed    SLR R LE 65 start of pulling, can go 80. L: 50 start of smptoms, can go to 80 degrees      Assessment: The patient had improvements in her strength of the great toe into extension. She does fatigue with resisted toe extension. Performed resisted dorsiflexion with [place] current to improve strength into dorsiflexion. Goals:   (to be met in 14 visits) PROGRESSING  1. The patient will demonstrate ambulation in the PT clinic with normal mechanics including reduction of dropfoot for short distances MET  2. The patient will demonstrate the ability to raise her foot into dorsiflexion against gravity MET  3. The patient will demonstrate at least 4+/5 hip abductor strength bilaterally MET  4. The patient will demonstrate resolution of positive SLR PROGRESSING  5.  The patient will be independent and adherent in a comprehensive HEP PROGRESSING      Plan: continue toe extension strength, dorsiflexion strength  Date: 11/15/2022   Tx#: 6/10 Date: 11/21/2022   Tx#: 7/10 Date: 11/28/2022   Tx#: 8/10 Date: 11/30/2022   Tx#: 9/10 Date: 12/5/2022   Tx#: 10/14 Date: 12/16/2022   Tx#: 11/14   Manual therapy  P-A central and unilateral lumbar spine grade III-IV L3-5  STM to calf, anterior tibialis Manual therapy  P-A central and unilateral lumbar spine grade III-IV L3-5  STM to evertors, anterior tibialis  x25 min Manual therapy  P-A central and unilateral lumbar spine grade III-IV L3-5  STM to evertors, anterior tibialis Manual therapy  P-A central and unilateral lumbar spine grade III-IV L3-5  STM to evertors, anterior tibialis Manual therapy  P-A central and unilateral lumbar spine grade III-IV L3-5 Manual therapy  STM to tibialis anterior and hallicis longus   ther ex  HS stretch strap 3x30 sec  Sciatic tensioner 15x  Calf stretching 3x30 sec ea  Heel raise/toe raise 2x10 ther ex  Sciatic tensioner 15x  Calf stretching 3x30 sec ea  <8 min, unbilled ther ex  Sciatic tensioner 15x  Hamstring stretching 3x30 sec ea  Calf stretching 3x30 sec ea   Ther ex  Sciatic tensioner 15x  Hamstring stretching 3x30 sec ea  Calf stretching 3x30 sec ea Ther ex  Seated slump test, slump sliders/tensioners  Seated toe yoga 10x  Lunge on 6\" box stretch 6x ea  FSU 4\" box 10x on L  Calf stretch slant board 3x30 sec Ther ex  LTR 10x  Piriformis stretch with swiss ball 3x30 sec ea     X Neuro re-ed  NMES 5 sec on, 10 sec off, custom parameters, on anterior tibialis 10 min. Performed AROM into dorsiflexion, and walking on heels. Neuro re-ed  NMES 5 sec on, 8 sec off, custom parameters, level 33, anterior tibialis 10 min. AROM into dorsiflexion, walking on heels Neuro re-ed  NMES 5 sec on, 8 sec off, custom parameters, level 33, anterior tibialis 10 min. Resisted dorsiflexion yellow TB, walking on heels X Neuro re-ed  NMES Estonian current, 10 on and 20 off 10 min, intensity 17     X X X X X    HEP:   Access Code: [email or web address]  URL: [email or web address]  Date: 12/05/2022  Prepared by: Liza    Exercises  Static Prone on Elbows - 1 x daily - 7 x weekly - 1 sets - 8 reps  Standing Lumbar Extension - 1 x daily - 7 x weekly - 1 sets - 8 reps  Seated Calf Stretch with Strap - 1 x daily - 7 x weekly - 1 sets - 3 reps - 30 sec hold  Seated Ankle Dorsiflexion AROM - 1 x daily - 7 x weekly - 3 sets - 6-8 reps  Gastroc Stretch on Wall - 1 x daily - 7 x weekly - 1 sets - 3 reps - 30 sec hold  Seated Sciatic Tensioner - 1 x daily - 7 x weekly - 1 sets - 10 reps  Seated Great Toe Extension - 1 x daily - 7 x weekly - 1 sets - 10 reps  Seated Lesser Toes Extension - 1 x daily - 7 x weekly - 1 sets - 10 reps        Charges: manual therapy x1, therapeutic exercise x1, electrical stimulation unattended x1      Total Timed Treatment: 40 min  Total Treatment Time: 40 min

## 2022-12-28 ENCOUNTER — APPOINTMENT (OUTPATIENT)
Dept: PHYSICAL THERAPY | Facility: HOSPITAL | Age: 42
End: 2022-12-28
Attending: STUDENT IN AN ORGANIZED HEALTH CARE EDUCATION/TRAINING PROGRAM
Payer: COMMERCIAL

## 2023-01-03 ENCOUNTER — TELEPHONE (OUTPATIENT)
Dept: PHYSICAL THERAPY | Facility: HOSPITAL | Age: 43
End: 2023-01-03

## 2023-01-03 ENCOUNTER — APPOINTMENT (OUTPATIENT)
Dept: PHYSICAL THERAPY | Facility: HOSPITAL | Age: 43
End: 2023-01-03
Attending: STUDENT IN AN ORGANIZED HEALTH CARE EDUCATION/TRAINING PROGRAM
Payer: COMMERCIAL

## 2023-01-16 ENCOUNTER — APPOINTMENT (OUTPATIENT)
Dept: PHYSICAL THERAPY | Facility: HOSPITAL | Age: 43
End: 2023-01-16
Payer: COMMERCIAL

## 2023-01-19 ENCOUNTER — OFFICE VISIT (OUTPATIENT)
Dept: NEUROLOGY | Facility: CLINIC | Age: 43
End: 2023-01-19
Payer: COMMERCIAL

## 2023-01-19 ENCOUNTER — LAB ENCOUNTER (OUTPATIENT)
Dept: LAB | Age: 43
End: 2023-01-19
Attending: FAMILY MEDICINE
Payer: COMMERCIAL

## 2023-01-19 VITALS
SYSTOLIC BLOOD PRESSURE: 116 MMHG | BODY MASS INDEX: 29.35 KG/M2 | WEIGHT: 174 LBS | RESPIRATION RATE: 16 BRPM | DIASTOLIC BLOOD PRESSURE: 88 MMHG | HEIGHT: 64.5 IN | HEART RATE: 88 BPM

## 2023-01-19 DIAGNOSIS — M21.372 ACQUIRED LEFT FOOT DROP: Primary | ICD-10-CM

## 2023-01-19 DIAGNOSIS — R20.0 NUMBNESS AND TINGLING OF LEFT LEG: ICD-10-CM

## 2023-01-19 DIAGNOSIS — R79.89 LOW VITAMIN D LEVEL: Primary | ICD-10-CM

## 2023-01-19 DIAGNOSIS — R20.2 NUMBNESS AND TINGLING OF LEFT LEG: ICD-10-CM

## 2023-01-19 DIAGNOSIS — R79.89 LOW SERUM VITAMIN D: Primary | ICD-10-CM

## 2023-01-19 DIAGNOSIS — E55.9 VITAMIN D DEFICIENCY: ICD-10-CM

## 2023-01-19 DIAGNOSIS — M21.372 ACQUIRED LEFT FOOT DROP: ICD-10-CM

## 2023-01-19 LAB
VIT B12 SERPL-MCNC: 339 PG/ML (ref 193–986)
VIT D+METAB SERPL-MCNC: 19.3 NG/ML (ref 30–100)

## 2023-01-19 PROCEDURE — 82306 VITAMIN D 25 HYDROXY: CPT | Performed by: FAMILY MEDICINE

## 2023-01-19 PROCEDURE — 82607 VITAMIN B-12: CPT | Performed by: OTHER

## 2023-01-19 RX ORDER — MULTIVIT-MIN/IRON/FOLIC ACID/K 18-600-40
1 CAPSULE ORAL DAILY
Qty: 30 TABLET | Refills: 0 | Status: SHIPPED | OUTPATIENT
Start: 2023-01-19 | End: 2023-02-18

## 2023-01-19 RX ORDER — SODIUM FLUORIDE AND POTASSIUM NITRATE 5.8; 57.5 MG/ML; MG/ML
GEL, DENTIFRICE DENTAL
COMMUNITY
Start: 2022-11-21

## 2023-01-19 RX ORDER — ERGOCALCIFEROL 1.25 MG/1
50000 CAPSULE ORAL WEEKLY
Qty: 8 CAPSULE | Refills: 0 | Status: SHIPPED | OUTPATIENT
Start: 2023-01-19

## 2023-01-23 ENCOUNTER — APPOINTMENT (OUTPATIENT)
Dept: PHYSICAL THERAPY | Facility: HOSPITAL | Age: 43
End: 2023-01-23
Attending: STUDENT IN AN ORGANIZED HEALTH CARE EDUCATION/TRAINING PROGRAM
Payer: COMMERCIAL

## 2023-01-23 ENCOUNTER — PROCEDURE VISIT (OUTPATIENT)
Dept: NEUROLOGY | Facility: CLINIC | Age: 43
End: 2023-01-23
Payer: COMMERCIAL

## 2023-01-23 DIAGNOSIS — R20.2 NUMBNESS AND TINGLING OF LEFT LEG: ICD-10-CM

## 2023-01-23 DIAGNOSIS — M21.372 ACQUIRED LEFT FOOT DROP: Primary | ICD-10-CM

## 2023-01-23 DIAGNOSIS — R20.0 NUMBNESS AND TINGLING OF LEFT LEG: ICD-10-CM

## 2023-01-23 PROCEDURE — 95886 MUSC TEST DONE W/N TEST COMP: CPT | Performed by: OTHER

## 2023-01-23 PROCEDURE — 95909 NRV CNDJ TST 5-6 STUDIES: CPT | Performed by: OTHER

## 2023-01-31 ENCOUNTER — OFFICE VISIT (OUTPATIENT)
Dept: PHYSICAL THERAPY | Facility: HOSPITAL | Age: 43
End: 2023-01-31
Attending: STUDENT IN AN ORGANIZED HEALTH CARE EDUCATION/TRAINING PROGRAM
Payer: COMMERCIAL

## 2023-01-31 PROCEDURE — 97110 THERAPEUTIC EXERCISES: CPT

## 2023-01-31 NOTE — PROGRESS NOTES
Diagnosis:   Left foot drop (M21.372)      Referring Provider: No ref. provider found  Date of Evaluation:    10/21/2022    Precautions:  None Next MD visit:   Neurology in January  Date of Surgery: n/a   Insurance Primary/Secondary: JOJO VICTOR PPO / N/A     # Auth Visits: 10 POC           Discharge Summary  Pt has attended 12 visits in Physical Therapy. Subjective: The patient reports that it took two days to feel better from the EMG. No changes since the last session, except she feels a bit stronger. She states that she hasn't had a lot of tingling- she had a little last night. Doesn't have a follow up scheduled with the neurologist since it is recovering, she is to follow up if her dropfoot returns. Pain: 0/10 pain      Objective: There is no drop foot noted with ambulation in clinic during today's appointment. Pt is able to hold dorsiflexion partial range with heel walking, it fatigues with >5 steps and starts to drop    DF AROM with knee extended: L 10 deg    Hip abduction 5/5 MALIA    Dorsiflexion 5/5 MMT  Eversion MMT 5/5  Great toe extension: 4+/5 on L      SLR: equal bilaterally, stretching into lower extremities, no pain or tingling. Assessment: The patient has improvements in her great toe extension strength since the previous session, it fatigues with held MMT. She can take a few steps with heel walking, however she has impaired endurance with this and it fatigues. The patient at this time has met long term goals and will be discharged from this plan of care for physical therapy at this time. Goals:   (to be met in 14 visits)   1. The patient will demonstrate ambulation in the PT clinic with normal mechanics including reduction of dropfoot for short distances MET  2. The patient will demonstrate the ability to raise her foot into dorsiflexion against gravity MET  3. The patient will demonstrate at least 4+/5 hip abductor strength bilaterally MET  4.  The patient will demonstrate resolution of positive SLR MET  5. The patient will be independent and adherent in a comprehensive HEP MET    FOTO: 71    Plan: The patient will be discharged from this plan of care for physical therapy. Patient/Family/Caregiver was advised of these findings, precautions, and treatment options and has agreed to actively participate in planning and for this course of care. Thank you for your referral. If you have any questions, please contact me at Dept: [phone]. Sincerely,  Electronically signed by therapist: Tim Alves, PT       Certification From: 2/3/4063  To:5/2/2023         Date: 11/28/2022   Tx#: 8/10 Date: 11/30/2022   Tx#: 9/10 Date: 12/5/2022   Tx#: 10/14 Date: 12/16/2022   Tx#: 11/14 Date: 12/31/2022   Tx#: 12/14   Manual therapy  P-A central and unilateral lumbar spine grade III-IV L3-5  STM to evertors, anterior tibialis Manual therapy  P-A central and unilateral lumbar spine grade III-IV L3-5  STM to evertors, anterior tibialis Manual therapy  P-A central and unilateral lumbar spine grade III-IV L3-5 Manual therapy  STM to tibialis anterior and hallicis longus X   ther ex  Sciatic tensioner 15x  Hamstring stretching 3x30 sec ea  Calf stretching 3x30 sec ea   Ther ex  Sciatic tensioner 15x  Hamstring stretching 3x30 sec ea  Calf stretching 3x30 sec ea Ther ex  Seated slump test, slump sliders/tensioners  Seated toe yoga 10x  Lunge on 6\" box stretch 6x ea  FSU 4\" box 10x on L  Calf stretch slant board 3x30 sec Ther ex  LTR 10x  Piriformis stretch with swiss ball 3x30 sec ea   Ther ex  Reassessment objective findings  Walking on heels  Calf stretching at wall 30 sec  Lower trunk rotation 10 sec hold, 5 ea  Discussion updated HEP   Neuro re-ed  NMES 5 sec on, 8 sec off, custom parameters, level 33, anterior tibialis 10 min. AROM into dorsiflexion, walking on heels Neuro re-ed  NMES 5 sec on, 8 sec off, custom parameters, level 33, anterior tibialis 10 min.  Resisted dorsiflexion yellow TB, walking on heels X Neuro re-ed  NMES [place] current, 10 on and 20 off 10 min, intensity 17   X   HEP:   Access Code: Dipika Argueta  Date: 01/31/2023  Prepared by: Josef Burgos    Exercises  Static Prone on Elbows - 1 x daily - 7 x weekly - 1 sets - 8 reps  Gastroc Stretch on Wall - 1 x daily - 7 x weekly - 1 sets - 3 reps - 30 sec hold  Sidelying Lumbar Rotation Stretch - 1 x daily - 7 x weekly - 1 sets - 5 reps - 5-10 sec hold  Heel Walking - 1 x daily - 7 x weekly - 1 sets - 4 reps        Charges:  therapeutic exercise x2     Total Timed Treatment: 26 min  Total Treatment Time: 26 min

## 2023-02-06 ENCOUNTER — TELEPHONE (OUTPATIENT)
Dept: NEUROLOGY | Facility: CLINIC | Age: 43
End: 2023-02-06

## 2023-02-06 ENCOUNTER — TELEPHONE (OUTPATIENT)
Dept: FAMILY MEDICINE CLINIC | Facility: CLINIC | Age: 43
End: 2023-02-06

## 2023-02-06 NOTE — TELEPHONE ENCOUNTER
Had hx of L foot footdrop  Been doing PT, completed  Recently had EMG bilat LE per neuro  Now R foot feels tender-  Back heel to side ankle   Denies spasm, no redness or swelling   She was thinking it might be from EMG test? But was assured less likely  Pt offered to monitor, conservative measure--Motrin, ice alternate heat, elevation, epsom salt soak, also offered to be seen if pain worsen   She opted to monitor and call for update and follow up if not relieved  With verbalized understanding

## 2023-02-06 NOTE — TELEPHONE ENCOUNTER
Called patient; discussed concerns - no localized swelling in areas where needle EMG performed; denies any weakness or swelling locally and complains of mainly pain - seems unrelated to procedure and recommend discuss with PCP other etiologies (ie plantar fasciitis, achilles pathology, connective tissue pathology vs vascular )     Patient agreeable to plan and appreciative of call.

## 2023-02-28 ENCOUNTER — PATIENT MESSAGE (OUTPATIENT)
Dept: FAMILY MEDICINE CLINIC | Facility: CLINIC | Age: 43
End: 2023-02-28

## 2023-02-28 DIAGNOSIS — M26.609 TMJ (TEMPOROMANDIBULAR JOINT DISORDER): ICD-10-CM

## 2023-02-28 RX ORDER — NAPROXEN 500 MG/1
500 TABLET ORAL 2 TIMES DAILY WITH MEALS
Qty: 20 TABLET | Refills: 0 | Status: SHIPPED | OUTPATIENT
Start: 2023-02-28

## 2023-02-28 NOTE — TELEPHONE ENCOUNTER
From: Kristen Gallo  To: Libby Carrillo MD  Sent: 2/28/2023 1:34 PM CST  Subject: TMJ Follow-up    Hi Dr. Magnolia Braun,  I have been having a TMJ flare-up since last night. I have been taking Advil liqui-gels and they are not working. The last time that I had this issue, you prescribed naproxen to help with the pain and swelling. Would you be able to send a script for Naproxen to assist. I know the cause is b/c I have not been sleeping with my nightguard consistently and I have been eating things that I normally avoid (I had steak on Sunday).

## 2023-02-28 NOTE — TELEPHONE ENCOUNTER
Ok to fill     LOV- 10/17/22,  Dr ARRIAGA acute 3/4/22, acute TMJ Dr ARRIAGA   1/20/22-physical     Last refill-3/4/22   Iva Snider- 20   RF-0  Not on maintenance Rx     PSR,   Pls call due for physical  Thank you     Rx pended for approval  Thank you

## 2023-03-01 ENCOUNTER — OFFICE VISIT (OUTPATIENT)
Dept: INTERNAL MEDICINE CLINIC | Facility: CLINIC | Age: 43
End: 2023-03-01
Payer: COMMERCIAL

## 2023-03-01 VITALS
BODY MASS INDEX: 28.85 KG/M2 | DIASTOLIC BLOOD PRESSURE: 82 MMHG | SYSTOLIC BLOOD PRESSURE: 122 MMHG | HEIGHT: 64 IN | HEART RATE: 82 BPM | OXYGEN SATURATION: 97 % | WEIGHT: 169 LBS | RESPIRATION RATE: 16 BRPM

## 2023-03-01 DIAGNOSIS — E66.9 OBESITY (BMI 30-39.9): ICD-10-CM

## 2023-03-01 DIAGNOSIS — Z51.81 ENCOUNTER FOR THERAPEUTIC DRUG MONITORING: Primary | ICD-10-CM

## 2023-03-01 DIAGNOSIS — R63.8 CRAVING FOR PARTICULAR FOOD: ICD-10-CM

## 2023-03-01 DIAGNOSIS — K59.04 CHRONIC IDIOPATHIC CONSTIPATION: ICD-10-CM

## 2023-03-01 PROCEDURE — 99213 OFFICE O/P EST LOW 20 MIN: CPT | Performed by: NURSE PRACTITIONER

## 2023-03-01 PROCEDURE — 3008F BODY MASS INDEX DOCD: CPT | Performed by: NURSE PRACTITIONER

## 2023-03-01 PROCEDURE — 3074F SYST BP LT 130 MM HG: CPT | Performed by: NURSE PRACTITIONER

## 2023-03-01 PROCEDURE — 3079F DIAST BP 80-89 MM HG: CPT | Performed by: NURSE PRACTITIONER

## 2023-03-01 RX ORDER — SEMAGLUTIDE 2.4 MG/.75ML
2.4 INJECTION, SOLUTION SUBCUTANEOUS WEEKLY
Qty: 9 ML | Refills: 1 | Status: SHIPPED | OUTPATIENT
Start: 2023-03-01

## 2023-04-14 ENCOUNTER — HOSPITAL ENCOUNTER (OUTPATIENT)
Age: 43
Discharge: HOME | End: 2023-04-14
Payer: COMMERCIAL

## 2023-04-14 ENCOUNTER — TELEPHONE (OUTPATIENT)
Dept: FAMILY MEDICINE CLINIC | Facility: CLINIC | Age: 43
End: 2023-04-14

## 2023-04-14 ENCOUNTER — PATIENT MESSAGE (OUTPATIENT)
Dept: FAMILY MEDICINE CLINIC | Facility: CLINIC | Age: 43
End: 2023-04-14

## 2023-04-14 VITALS
WEIGHT: 166 LBS | HEART RATE: 84 BPM | RESPIRATION RATE: 16 BRPM | BODY MASS INDEX: 27.66 KG/M2 | OXYGEN SATURATION: 98 % | SYSTOLIC BLOOD PRESSURE: 115 MMHG | TEMPERATURE: 100 F | HEIGHT: 65 IN | DIASTOLIC BLOOD PRESSURE: 79 MMHG

## 2023-04-14 DIAGNOSIS — R23.8 SKIN SENSITIVITY: Primary | ICD-10-CM

## 2023-04-14 PROCEDURE — 99212 OFFICE O/P EST SF 10 MIN: CPT

## 2023-04-14 NOTE — ED INITIAL ASSESSMENT (HPI)
Patient reports that she has tenderness to her skin on her back on left breast area. Patient reports she was told to come in by her PCP to get tested for shingles.

## 2023-04-14 NOTE — TELEPHONE ENCOUNTER
From: Simeon Rodriguez  To: Wynn Cooks, DO  Sent: 4/14/2023 11:07 AM CDT  Subject: Skin sensitivity    Hi there - I am reaching out because over the last 4 days the skin on my back on my left side and my chest area on my left side is very sensitive to touch. My skin hurts when I touch the area and if my clothing rugs against the area. I looked up my symptoms online and a possible cause is nerve issues. That made me a little worried because I was diagnosed with foot drop at the end of last year and that has to do with my nerves. I just wanted to tell you just incase the issues may be related.

## 2023-04-14 NOTE — TELEPHONE ENCOUNTER
Lázaro Ortiz would like a call from the nurse she is having skin pain that she would like to discuss with a nurse, Please call Lázaro Ortiz at [phone]

## 2023-04-14 NOTE — DISCHARGE INSTRUCTIONS
Follow-up with your primary care provider. Return if you develop a rash. Go to ER with any new or worsening symptoms.

## 2023-06-09 ENCOUNTER — TELEPHONE (OUTPATIENT)
Dept: INTERNAL MEDICINE CLINIC | Facility: CLINIC | Age: 43
End: 2023-06-09

## 2023-06-11 NOTE — TELEPHONE ENCOUNTER
Approved  Approved    Prior authorization approved Case ID: [id number]      Payer:  [address]  [phone]  [facility]  [phone]    [id number]; Review Type:Prior Auth; Coverage Start Date:05/10/2023; Coverage End Date:06/08/2024; Approval Details    Authorized from May 10, 2023 to June 8, 2024      Electronic appeal:  Not supported   View History     Medication Being Authorized     semaglutide-weight management (WEGOVY) 2.4 MG/0.75ML Subcutaneous Solution Auto-injector    Inject 0.75 mL (2.4 mg total) into the skin once a week.

## 2023-07-03 RX ORDER — CYCLOBENZAPRINE HCL 10 MG
TABLET ORAL
COMMUNITY
Start: 2023-05-14

## 2023-07-06 ENCOUNTER — OFFICE VISIT (OUTPATIENT)
Dept: FAMILY MEDICINE CLINIC | Facility: CLINIC | Age: 43
End: 2023-07-06
Payer: COMMERCIAL

## 2023-07-06 VITALS
HEIGHT: 64 IN | WEIGHT: 174 LBS | RESPIRATION RATE: 18 BRPM | HEART RATE: 74 BPM | SYSTOLIC BLOOD PRESSURE: 128 MMHG | BODY MASS INDEX: 29.71 KG/M2 | DIASTOLIC BLOOD PRESSURE: 78 MMHG | OXYGEN SATURATION: 98 %

## 2023-07-06 DIAGNOSIS — Z13.89 SCREENING FOR GENITOURINARY CONDITION: ICD-10-CM

## 2023-07-06 DIAGNOSIS — Z12.12 SCREENING FOR COLORECTAL CANCER: ICD-10-CM

## 2023-07-06 DIAGNOSIS — Z12.4 SCREENING FOR MALIGNANT NEOPLASM OF CERVIX: ICD-10-CM

## 2023-07-06 DIAGNOSIS — Z11.3 SCREENING FOR STD (SEXUALLY TRANSMITTED DISEASE): ICD-10-CM

## 2023-07-06 DIAGNOSIS — Z12.11 SCREENING FOR COLORECTAL CANCER: ICD-10-CM

## 2023-07-06 DIAGNOSIS — Z00.00 ROUTINE GENERAL MEDICAL EXAMINATION AT A HEALTH CARE FACILITY: Primary | ICD-10-CM

## 2023-07-06 DIAGNOSIS — N92.1 MENORRHAGIA WITH IRREGULAR CYCLE: ICD-10-CM

## 2023-07-06 DIAGNOSIS — Z12.31 ENCOUNTER FOR SCREENING MAMMOGRAM FOR MALIGNANT NEOPLASM OF BREAST: ICD-10-CM

## 2023-07-06 DIAGNOSIS — Z00.00 LABORATORY EXAMINATION ORDERED AS PART OF A ROUTINE GENERAL MEDICAL EXAMINATION: ICD-10-CM

## 2023-07-06 DIAGNOSIS — R73.9 HYPERGLYCEMIA: ICD-10-CM

## 2023-07-06 DIAGNOSIS — R79.89 LOW SERUM VITAMIN D: ICD-10-CM

## 2023-07-06 DIAGNOSIS — E66.3 OVERWEIGHT (BMI 25.0-29.9): ICD-10-CM

## 2023-07-06 DIAGNOSIS — E55.9 VITAMIN D DEFICIENCY: ICD-10-CM

## 2023-07-06 LAB
ALBUMIN SERPL-MCNC: 3.4 G/DL (ref 3.4–5)
ALBUMIN/GLOB SERPL: 1 {RATIO} (ref 1–2)
ALP LIVER SERPL-CCNC: 42 U/L
ALT SERPL-CCNC: 16 U/L
ANION GAP SERPL CALC-SCNC: 6 MMOL/L (ref 0–18)
AST SERPL-CCNC: 20 U/L (ref 15–37)
BASOPHILS # BLD AUTO: 0.06 X10(3) UL (ref 0–0.2)
BASOPHILS NFR BLD AUTO: 1.4 %
BILIRUB SERPL-MCNC: 0.4 MG/DL (ref 0.1–2)
BUN BLD-MCNC: 7 MG/DL (ref 7–18)
CALCIUM BLD-MCNC: 8.9 MG/DL (ref 8.5–10.1)
CHLORIDE SERPL-SCNC: 112 MMOL/L (ref 98–112)
CHOLEST SERPL-MCNC: 172 MG/DL (ref ?–200)
CO2 SERPL-SCNC: 25 MMOL/L (ref 21–32)
CREAT BLD-MCNC: 0.9 MG/DL
EOSINOPHIL # BLD AUTO: 0.38 X10(3) UL (ref 0–0.7)
EOSINOPHIL NFR BLD AUTO: 9 %
ERYTHROCYTE [DISTWIDTH] IN BLOOD BY AUTOMATED COUNT: 14.8 %
EST. AVERAGE GLUCOSE BLD GHB EST-MCNC: 108 MG/DL (ref 68–126)
FASTING PATIENT LIPID ANSWER: YES
FASTING STATUS PATIENT QL REPORTED: YES
GFR SERPLBLD BASED ON 1.73 SQ M-ARVRAT: 80 ML/MIN/1.73M2 (ref 60–?)
GLOBULIN PLAS-MCNC: 3.5 G/DL (ref 2.8–4.4)
GLUCOSE BLD-MCNC: 85 MG/DL (ref 70–99)
HBA1C MFR BLD: 5.4 % (ref ?–5.7)
HCT VFR BLD AUTO: 36.6 %
HDLC SERPL-MCNC: 74 MG/DL (ref 40–59)
HGB BLD-MCNC: 11.1 G/DL
IMM GRANULOCYTES # BLD AUTO: 0.01 X10(3) UL (ref 0–1)
IMM GRANULOCYTES NFR BLD: 0.2 %
LDLC SERPL CALC-MCNC: 83 MG/DL (ref ?–100)
LYMPHOCYTES # BLD AUTO: 1.28 X10(3) UL (ref 1–4)
LYMPHOCYTES NFR BLD AUTO: 30.5 %
MCH RBC QN AUTO: 24.9 PG (ref 26–34)
MCHC RBC AUTO-ENTMCNC: 30.3 G/DL (ref 31–37)
MCV RBC AUTO: 82.2 FL
MONOCYTES # BLD AUTO: 0.29 X10(3) UL (ref 0.1–1)
MONOCYTES NFR BLD AUTO: 6.9 %
NEUTROPHILS # BLD AUTO: 2.18 X10 (3) UL (ref 1.5–7.7)
NEUTROPHILS # BLD AUTO: 2.18 X10(3) UL (ref 1.5–7.7)
NEUTROPHILS NFR BLD AUTO: 52 %
NONHDLC SERPL-MCNC: 98 MG/DL (ref ?–130)
OSMOLALITY SERPL CALC.SUM OF ELEC: 293 MOSM/KG (ref 275–295)
PLATELET # BLD AUTO: 398 10(3)UL (ref 150–450)
POTASSIUM SERPL-SCNC: 4.8 MMOL/L (ref 3.5–5.1)
PROT SERPL-MCNC: 6.9 G/DL (ref 6.4–8.2)
RBC # BLD AUTO: 4.45 X10(6)UL
SODIUM SERPL-SCNC: 143 MMOL/L (ref 136–145)
TRIGL SERPL-MCNC: 80 MG/DL (ref 30–149)
TSI SER-ACNC: 1.35 MIU/ML (ref 0.36–3.74)
VIT D+METAB SERPL-MCNC: 21.7 NG/ML (ref 30–100)
VLDLC SERPL CALC-MCNC: 13 MG/DL (ref 0–30)
WBC # BLD AUTO: 4.2 X10(3) UL (ref 4–11)

## 2023-07-06 PROCEDURE — 80050 GENERAL HEALTH PANEL: CPT | Performed by: FAMILY MEDICINE

## 2023-07-06 PROCEDURE — 87591 N.GONORRHOEAE DNA AMP PROB: CPT | Performed by: FAMILY MEDICINE

## 2023-07-06 PROCEDURE — 80061 LIPID PANEL: CPT | Performed by: FAMILY MEDICINE

## 2023-07-06 PROCEDURE — 82306 VITAMIN D 25 HYDROXY: CPT | Performed by: FAMILY MEDICINE

## 2023-07-06 PROCEDURE — 87491 CHLMYD TRACH DNA AMP PROBE: CPT | Performed by: FAMILY MEDICINE

## 2023-07-06 PROCEDURE — 87389 HIV-1 AG W/HIV-1&-2 AB AG IA: CPT | Performed by: FAMILY MEDICINE

## 2023-07-06 PROCEDURE — 83036 HEMOGLOBIN GLYCOSYLATED A1C: CPT | Performed by: FAMILY MEDICINE

## 2023-07-07 LAB
C TRACH DNA SPEC QL NAA+PROBE: NEGATIVE
N GONORRHOEA DNA SPEC QL NAA+PROBE: NEGATIVE

## 2023-07-09 NOTE — PROGRESS NOTES
Dear Art Barron,    Your vitamin D is low -- if you are not currently taking any vitamin D, I advise 1000 units daily. You are negative for HIV, GC, and chlamydia. The rest of your blood work is stable.      Sincerely,  Dr. Hero Walters

## 2023-07-11 ENCOUNTER — TELEPHONE (OUTPATIENT)
Dept: FAMILY MEDICINE CLINIC | Facility: CLINIC | Age: 43
End: 2023-07-11

## 2023-07-11 ENCOUNTER — LAB ENCOUNTER (OUTPATIENT)
Dept: LAB | Age: 43
End: 2023-07-11
Attending: FAMILY MEDICINE
Payer: COMMERCIAL

## 2023-07-11 DIAGNOSIS — N92.3 INTERMENSTRUAL HEAVY BLEEDING: ICD-10-CM

## 2023-07-11 DIAGNOSIS — N92.1 METRORRHAGIA: Primary | ICD-10-CM

## 2023-07-11 DIAGNOSIS — N92.1 METRORRHAGIA: ICD-10-CM

## 2023-07-11 LAB
BASOPHILS # BLD AUTO: 0.05 X10(3) UL (ref 0–0.2)
BASOPHILS NFR BLD AUTO: 1 %
EOSINOPHIL # BLD AUTO: 0.31 X10(3) UL (ref 0–0.7)
EOSINOPHIL NFR BLD AUTO: 6.2 %
ERYTHROCYTE [DISTWIDTH] IN BLOOD BY AUTOMATED COUNT: 15 %
HCT VFR BLD AUTO: 33.6 %
HGB BLD-MCNC: 10.7 G/DL
IMM GRANULOCYTES # BLD AUTO: 0.01 X10(3) UL (ref 0–1)
IMM GRANULOCYTES NFR BLD: 0.2 %
LYMPHOCYTES # BLD AUTO: 1.67 X10(3) UL (ref 1–4)
LYMPHOCYTES NFR BLD AUTO: 33.1 %
MCH RBC QN AUTO: 25.5 PG (ref 26–34)
MCHC RBC AUTO-ENTMCNC: 31.8 G/DL (ref 31–37)
MCV RBC AUTO: 80 FL
MONOCYTES # BLD AUTO: 0.36 X10(3) UL (ref 0.1–1)
MONOCYTES NFR BLD AUTO: 7.1 %
NEUTROPHILS # BLD AUTO: 2.64 X10 (3) UL (ref 1.5–7.7)
NEUTROPHILS # BLD AUTO: 2.64 X10(3) UL (ref 1.5–7.7)
NEUTROPHILS NFR BLD AUTO: 52.4 %
PLATELET # BLD AUTO: 399 10(3)UL (ref 150–450)
RBC # BLD AUTO: 4.2 X10(6)UL
WBC # BLD AUTO: 5 X10(3) UL (ref 4–11)

## 2023-07-11 PROCEDURE — 85025 COMPLETE CBC W/AUTO DIFF WBC: CPT

## 2023-07-11 NOTE — TELEPHONE ENCOUNTER
Pt is calling because she is bleeding again. Pt cannot get in to see an OB until 9/20. Pt states she is bleeding very heavy. It started again this week. She is bleeding through a super maxi pad every hour and half. Pt is concerned she is hemorrhaging. Please advise and thank you  .

## 2023-07-11 NOTE — TELEPHONE ENCOUNTER
Last OV 7/6/23 referred to Dr Jeronimo Warner for irreg periods, has appt scheduled 9/20/23, can't be seen earlier because she is a new pt. LMP 6/14/23-normal.  Started again July 3, bleeding has intermittently heavy. Pt is soaking through  Super maxi pad every 1.5-2 hrs for 3 days. Denies chest pain, sob, lightheadedness, abdominal pain.

## 2023-07-11 NOTE — TELEPHONE ENCOUNTER
Pt advised with verbalized understanding    Advised to go to ER if bleeding gets severe, feels weak, chest pain, SOB, agreed.

## 2023-08-09 PROBLEM — K63.5 POLYP OF COLON: Status: ACTIVE | Noted: 2023-08-09

## 2023-10-03 ENCOUNTER — PATIENT MESSAGE (OUTPATIENT)
Dept: INTERNAL MEDICINE CLINIC | Facility: CLINIC | Age: 43
End: 2023-10-03

## 2023-10-03 NOTE — TELEPHONE ENCOUNTER
Appt is tomorrow  Last in office visit was March  Exposed to covid - negative test  Sore throat. Reached out to KW to see if she wants to change to video or reschedule - she advised to still come in and wear a mask if symptoms present. .  My chart sent to patient.

## 2023-10-04 ENCOUNTER — OFFICE VISIT (OUTPATIENT)
Dept: INTERNAL MEDICINE CLINIC | Facility: CLINIC | Age: 43
End: 2023-10-04
Payer: COMMERCIAL

## 2023-10-04 VITALS
RESPIRATION RATE: 16 BRPM | BODY MASS INDEX: 29.88 KG/M2 | SYSTOLIC BLOOD PRESSURE: 126 MMHG | HEART RATE: 74 BPM | HEIGHT: 64 IN | WEIGHT: 175 LBS | DIASTOLIC BLOOD PRESSURE: 72 MMHG

## 2023-10-04 DIAGNOSIS — K59.04 CHRONIC IDIOPATHIC CONSTIPATION: ICD-10-CM

## 2023-10-04 DIAGNOSIS — Z51.81 ENCOUNTER FOR THERAPEUTIC DRUG MONITORING: Primary | ICD-10-CM

## 2023-10-04 DIAGNOSIS — R63.8 CRAVING FOR PARTICULAR FOOD: ICD-10-CM

## 2023-10-04 DIAGNOSIS — E66.9 CLASS 1 OBESITY WITHOUT SERIOUS COMORBIDITY WITH BODY MASS INDEX (BMI) OF 30.0 TO 30.9 IN ADULT, UNSPECIFIED OBESITY TYPE: ICD-10-CM

## 2023-10-04 PROBLEM — E66.811 CLASS 1 OBESITY WITHOUT SERIOUS COMORBIDITY WITH BODY MASS INDEX (BMI) OF 30.0 TO 30.9 IN ADULT: Status: ACTIVE | Noted: 2023-10-04

## 2023-10-04 PROCEDURE — 99213 OFFICE O/P EST LOW 20 MIN: CPT | Performed by: NURSE PRACTITIONER

## 2023-10-04 PROCEDURE — 3074F SYST BP LT 130 MM HG: CPT | Performed by: NURSE PRACTITIONER

## 2023-10-04 PROCEDURE — 3008F BODY MASS INDEX DOCD: CPT | Performed by: NURSE PRACTITIONER

## 2023-10-04 PROCEDURE — 3078F DIAST BP <80 MM HG: CPT | Performed by: NURSE PRACTITIONER

## 2023-10-04 RX ORDER — FLUOXETINE HYDROCHLORIDE 20 MG/1
20 CAPSULE ORAL DAILY
Qty: 90 CAPSULE | Refills: 1 | Status: SHIPPED | OUTPATIENT
Start: 2023-10-04

## 2023-10-04 RX ORDER — SEMAGLUTIDE 2.4 MG/.75ML
2.4 INJECTION, SOLUTION SUBCUTANEOUS WEEKLY
Qty: 9 ML | Refills: 1 | Status: SHIPPED | OUTPATIENT
Start: 2023-10-04

## 2023-10-04 RX ORDER — METFORMIN HYDROCHLORIDE 750 MG/1
1500 TABLET, EXTENDED RELEASE ORAL
Qty: 180 TABLET | Refills: 1 | Status: SHIPPED | OUTPATIENT
Start: 2023-10-04

## 2023-10-12 DIAGNOSIS — M26.609 TMJ (TEMPOROMANDIBULAR JOINT DISORDER): ICD-10-CM

## 2023-10-12 RX ORDER — NAPROXEN 500 MG/1
500 TABLET ORAL 2 TIMES DAILY WITH MEALS
Qty: 180 TABLET | Refills: 0 | Status: SHIPPED | OUTPATIENT
Start: 2023-10-12

## 2023-10-12 NOTE — TELEPHONE ENCOUNTER
Last office visit: 7/6/2023   Labs last completed: 7/6/2023  Requested medication(s) are due for refill today: yes  Requested medication(s) are on the active medication list same strength, form, dose/ sig: no  Requested medication(s) are managed by provider: yes  Patient has already received a courtsey refill: no

## 2023-10-19 ENCOUNTER — TELEPHONE (OUTPATIENT)
Dept: INTERNAL MEDICINE CLINIC | Facility: CLINIC | Age: 43
End: 2023-10-19

## 2023-10-19 RX ORDER — PLECANATIDE 3 MG/1
3 TABLET ORAL DAILY
Qty: 90 TABLET | Refills: 0 | Status: SHIPPED | OUTPATIENT
Start: 2023-10-19 | End: 2023-11-18

## 2023-10-19 NOTE — TELEPHONE ENCOUNTER
Requesting Trulance 3 mg to mail order  LOV: 10/4/23  RTC: 10/4/23  Last Relevant Labs: 4 months  Filled: 9/22/23 #30 with 1 refills    90 day needed - sent to mail order today

## 2023-12-29 ENCOUNTER — TELEPHONE (OUTPATIENT)
Dept: FAMILY MEDICINE CLINIC | Facility: CLINIC | Age: 43
End: 2023-12-29

## 2023-12-29 NOTE — TELEPHONE ENCOUNTER
Received call earlier this week on 12/26/23 about patient testing positive for COVID the day prior and she is inquiring about red flag symptoms to go to the ER. Recommended adequate hydration, humidifier use, honey/lemon mixture, tylenol/ibuprofen PRN, etc. Went over red flag symptoms to report to ER including but not limited to persistent chest pain/pressure, severe SOB, new onset confusion, etc. Went over current quarantine guidelines. Patient endorses understanding and has no further questions at this time.      Miguel Price MD, 12/29/23, 7:36 AM

## 2024-02-19 ENCOUNTER — OFFICE VISIT (OUTPATIENT)
Dept: FAMILY MEDICINE CLINIC | Facility: CLINIC | Age: 44
End: 2024-02-19
Payer: COMMERCIAL

## 2024-02-19 VITALS
RESPIRATION RATE: 16 BRPM | SYSTOLIC BLOOD PRESSURE: 112 MMHG | OXYGEN SATURATION: 99 % | TEMPERATURE: 99 F | HEART RATE: 107 BPM | DIASTOLIC BLOOD PRESSURE: 72 MMHG

## 2024-02-19 DIAGNOSIS — R11.2 NAUSEA AND VOMITING, UNSPECIFIED VOMITING TYPE: Primary | ICD-10-CM

## 2024-02-19 PROCEDURE — 3078F DIAST BP <80 MM HG: CPT | Performed by: NURSE PRACTITIONER

## 2024-02-19 PROCEDURE — 99213 OFFICE O/P EST LOW 20 MIN: CPT | Performed by: NURSE PRACTITIONER

## 2024-02-19 PROCEDURE — 3074F SYST BP LT 130 MM HG: CPT | Performed by: NURSE PRACTITIONER

## 2024-02-19 RX ORDER — ONDANSETRON 4 MG/1
4 TABLET, FILM COATED ORAL EVERY 8 HOURS PRN
Qty: 12 TABLET | Refills: 0 | Status: SHIPPED | OUTPATIENT
Start: 2024-02-19 | End: 2024-02-22

## 2024-02-19 RX ORDER — PROGESTERONE 200 MG/1
CAPSULE ORAL
COMMUNITY
Start: 2023-11-02

## 2024-02-19 NOTE — PROGRESS NOTES
Subjective:   Patient ID: Gris Fan is a 43 year old female.    Vomiting   This is a new problem. The current episode started today (woke her from sleep at 1:30 am). The problem occurs more than 10 times per day. The problem has been unchanged. The emesis has an appearance of bile and stomach contents. There has been no fever. Associated symptoms include chills and dizziness. She has tried acetaminophen for the symptoms. The treatment provided no relief.       History/Other:   Review of Systems   Constitutional:  Positive for chills.   Gastrointestinal:  Positive for vomiting.   Neurological:  Positive for dizziness.   All other systems reviewed and are negative.    Current Outpatient Medications   Medication Sig Dispense Refill    progesterone 200 MG Oral Cap PRN      naproxen 500 MG Oral Tab Take 1 tablet (500 mg total) by mouth 2 (two) times daily with meals. 180 tablet 0    semaglutide-weight management (WEGOVY) 2.4 MG/0.75ML Subcutaneous Solution Auto-injector Inject 0.75 mL (2.4 mg total) into the skin once a week. 9 mL 1    FLUoxetine 20 MG Oral Cap Take 1 capsule (20 mg total) by mouth daily. 90 capsule 1    metFORMIN  MG Oral Tablet 24 Hr Take 2 tablets (1,500 mg total) by mouth daily with food. (Patient taking differently: Take 2 tablets (1,500 mg total) by mouth daily with food. Pt stated 750mg daily) 180 tablet 1    PREVIDENT 5000 SENSITIVE 1.1-5 % Dental Gel Use daily      Cholecalciferol (VITAMIN D) 50 MCG (2000 UT) Oral Cap Take 1 capsule (2,000 Units total) by mouth daily. 30 capsule 0    cyclobenzaprine 10 MG Oral Tab prn (Patient not taking: Reported on 2/19/2024)      Ipratropium Bromide 0.06 % Nasal Solution 1 spray by Nasal route 3 (three) times daily. (Patient not taking: Reported on 2/19/2024) 3 each 2    MULTIVITAMIN TAB/CAP Take 1 tablet by mouth daily. (Patient not taking: Reported on 2/19/2024)       Allergies:No Known Allergies    /72   Pulse 107   Temp 99.2 °F  (37.3 °C) (Oral)   Resp 16   LMP 02/13/2024 (Approximate)   SpO2 99%       Objective:   Physical Exam  Constitutional:       General: She is not in acute distress.     Appearance: Normal appearance. She is not ill-appearing.   HENT:      Head: Normocephalic and atraumatic.      Nose: Nose normal.      Mouth/Throat:      Mouth: Mucous membranes are moist.      Pharynx: Oropharynx is clear. No oropharyngeal exudate or posterior oropharyngeal erythema.   Eyes:      Pupils: Pupils are equal, round, and reactive to light.   Cardiovascular:      Rate and Rhythm: Regular rhythm. Tachycardia present.      Pulses: Normal pulses.      Heart sounds: Normal heart sounds.   Pulmonary:      Effort: Pulmonary effort is normal. No respiratory distress.      Breath sounds: Normal breath sounds.   Abdominal:      General: Abdomen is flat. Bowel sounds are normal. There is no distension.      Palpations: Abdomen is soft.      Tenderness: There is abdominal tenderness in the epigastric area, periumbilical area and suprapubic area. There is no right CVA tenderness or left CVA tenderness.   Musculoskeletal:         General: Normal range of motion.      Cervical back: Normal range of motion and neck supple.   Lymphadenopathy:      Cervical: Cervical adenopathy present.   Skin:     General: Skin is warm and dry.      Capillary Refill: Capillary refill takes less than 2 seconds.   Neurological:      General: No focal deficit present.      Mental Status: She is alert.   Psychiatric:         Mood and Affect: Mood normal.         Assessment & Plan:   1. Nausea and vomiting, unspecified vomiting type        No orders of the defined types were placed in this encounter.      Meds This Visit:  Requested Prescriptions     Signed Prescriptions Disp Refills    ondansetron (ZOFRAN) 4 mg tablet 12 tablet 0     Sig: Take 1 tablet (4 mg total) by mouth every 8 (eight) hours as needed for Nausea.     There are no Patient Instructions on file for this  visit.    Medication use and risk/benefit discussed. Self care discussed. To ER for worsening symptoms. Patient and spouse verbalized understanding and agrees to plan.

## 2024-03-03 NOTE — PROGRESS NOTES
Gris Fan is a 43 year old female presents today for follow-up on medical weight loss program for the treatment of overweight, obesity, or morbid obesity.    S:  Current weight   Wt Readings from Last 6 Encounters:   03/04/24 174 lb (78.9 kg)   10/04/23 175 lb (79.4 kg)   09/13/23 174 lb 3.2 oz (79 kg)   08/01/23 172 lb (78 kg)   07/06/23 174 lb (78.9 kg)   04/14/23 166 lb (75.3 kg)    AND BMI Body mass index is 29.87 kg/m²..    Patient has lost 1# since LOV 5 month ago. She has been compliant but forgot to increase Metformin ER and still taking just 1/day. Constipation unchanged- had success on Linzess, but cost prohibitive and has been on Trulance which does not work as well. Struggling to rebuild her schedule.    Testing/consult completed since LOV: Dietician: 5/2021 with Juan: Estimated current caloric intake: 1200 cals/d, 51 gms protein, 90 gms carb    [facility] Follow Up    Question 3/3/2024  7:23 PM CST - Filed by Patient   Please describe a success moment: Giving myself bebe   Please describe a challenging moment/needs for improvement: Getting back in the gym has been a struggle.Corrie had Covid for the 1st time/stomach flu and a root canal in the last 8 weeks.  Im just tired   Please complete this 24 hour food journal, listing everything you had to eat in the past day. Include the average time of day you ate these meals at    List foods, qty and prep for breakfast: Grapes and cheese/Venti mint citrus honey tea   List foods, qty and prep for lunch. Half a gyro   List foods, qty and prep for dinner. Veggies and rice   List foods, qty and prep for snacks. Sugar free jello   List the types and qty of fluids consumed 8 cups water / 1 mimosa   On average, how many meals did you eat out per week? 10   Exercise    How many days per week are you active or exercise 2   On average, how many days were anaerobic (strength/resistance) exercises performed? 1   On average, how many days were aerobic  (cardio) exercises performed? 1   Perceived level of exertion on a scale of 1-5, with 5 being very intense: 3   Stress    Average stress level on a scale of 1-10, with 10 being extremely stressed: 8   If greater than 5/1O how would you grade your coping mechanisms? moderate   Sleep hours and integrity    How many hours of uninterrupted sleep do you get a night: 6   Do you feel rested in the morning: No   If no, what may have been disrupting your sleep? Constipation   Please list any goal(s) for your next visit Refocus     Social hx and PMH reviewed. 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  with 2 kids. Spouse in [facility] as well- underwent RYGB.    REVIEW OF SYSTEMS:  GENERAL: feels well otherwise  LUNGS: denies shortness of breath with exertion  CARDIOVASCULAR: denies chest pain on exertion, denies palpitations or pedal edema  GI: denies abdominal pain.  No N/V/D, hx of chronic constipation- currently on Trulance, but better success on Linzess  MUSCULOSKELETAL: no acute joint or muscle pain  NEURO: denies headaches or dizziness  PSYCH: denies change in behavior or mood, denies feeling sad or depressed. Answers submitted by the patient for this visit:  Medical Weight Loss Follow Up (Submitted on 3/3/2024)  If greater than 5/1O how would you grade your coping mechanisms?: moderate      EXAM:  /84   Pulse 80   Resp 16   Ht 5' 4\" (1.626 m)   Wt 174 lb (78.9 kg)   LMP 02/13/2024 (Approximate)   SpO2 97%   BMI 29.87 kg/m²   GENERAL: well developed, well nourished, in no apparent distress, overweight  EYES: conjunctiva pink, sclera non icteric, PERRLA  LUNGS: CTA in all fields, breathing non labored  CARDIO: RRR without murmur, normal S1 and S2 without clicks or gallops, no pedal edema.  GI: +BS, soft, non tender   NEURO/MS: motor and sensory grossly intact  PSYCH: pleasant, cooperative, normal mood and affect    ASSESSMENT AND PLAN:  Encounter Diagnoses   Name Primary?    Encounter for  therapeutic drug monitoring Yes    Class 1 obesity without serious comorbidity with body mass index (BMI) of 30.0 to 30.9 in adult, unspecified obesity type     Craving for particular food     Vitamin D deficiency     Chronic idiopathic constipation        Orders Placed This Encounter   Procedures    Vitamin D    Vitamin B12       Meds & Refills for this Visit:  Requested Prescriptions     Signed Prescriptions Disp Refills    FLUoxetine 20 MG Oral Cap 90 capsule 1     Sig: Take 1 capsule (20 mg total) by mouth daily.    Tirzepatide-Weight Management (ZEPBOUND) 10 MG/0.5ML Subcutaneous Solution Auto-injector 2 mL 2     Sig: Inject 10 mg into the skin once a week.       Imaging & Consults:  OP REFERRAL TO [facility] ([facility] USE ONLY)      Plan:  Patient has lost 1# since LOV 5 month ago on Wegovy 2.4 mg weekly, Metformin  mg daily, Fluoxetine 20 mg daily with a total weight loss of 61# since initial consult on 1/14/2020 with initial weight of 228#. Weight loss goal: to improve health and get to a healthier weight. CPM, aside from switch to Zepbound from Wegovy as directed with option to increase dose after 4 weeks and . Hx of Topamax and Trokendi XR with SEs, Hx of diethylpropion, phendimetrazine, phentermine, plenity, fluoxetine.  on fitness, prioritizing sleep, look into  cost reduction for Linzess.  See patient instructions below for additional plans and patient counseling.      Patient Instructions   Continue making lifestyle changes that focus on good nutrition, regular exercise and stress management.    Medication Plan: Continue current medication regimen aside from increase Metformin ER to 2 tabs (1500 mg) daily and finish out Wegovy and then switch to Zepbound 10 mg weekly. If <5# weight loss in 1st month contact office for dose increase. Visit the website www.zepbound.[employer] for coupon and further education on dosing. This medication may require a prior authorization (PA) by  your insurance. A PA may take one week plus to complete and our office will be in touch during this process if needed. If cost prohibitive plan: resume Wegovy.    Tips while taking an injectable weight loss medication:    Be an intuitive eater. Listen to your hunger and fullness signals, stopping when you are full.  Consume protein and produce in your day, striving for a rainbow of color of produce.  Reduce portions to staring size of 1 cup size and check in with your gut to see if you are full. Use a sand timer to slow down your eating pace to allow for 15-20 minutes to complete a meal.  Reduce refined sugars and high fat foods, as they may contribute to greater side effects of nausea and heartburn.  Stop eating 3 hours before bedtime to allow your food to digest.  Remain hydrated with water or non caloric and non caffeine beverages.  Use over the counter red lozenge/supplement to help reduce nausea if needed.    Next steps to work on before next office visit include:  Set a healthy routine by establishing a regular bedtime and/or consistent strive for 7 hours of sleep.  Establish 2x/week for fitness and check on  coupon for Linzess.    Building a Healthy Routine for a Healthy Lifestyle  March 3, 2023  Posted in Blog, Motivation, Support  By Your Weight Matters Campaign    There are some people who seem to get everything done. They stay on top of tasks at work, manage their health and even spend time doing family activities. These people make it seem easy as they effortlessly go through life without missing a beat. Others seem to always be playing catch-up, feeling constantly behind and consistently stressed.    What’s the difference between these two people? Building a healthy routine.    What is a Routine?  A routine is a set of actions you do over and over again, consistently. Most people have many routines in their lives. For example, we make routines out of making coffee in the morning and  driving to work. Turning on your computer and checking emails as your first work task is also a routine. These are things you do automatically as part of your day.    However, not all routines are healthy. For instance, you can make an unhealthy routine out of going through the drive-thru for breakfast each morning. Maybe you routinely spend an hour or more watching television on the couch each night.    If you find yourself caught in one or more unhealthy routines, take some time to see how you can improve your lifestyle. Building a set of healthy routines throughout your day can make a big difference in both your physical and mental health.    Tips for Building a Healthy Routine  How do you get started? Building a healthy routine can be overwhelming to think about in the beginning stages, so keep these fundamental steps in mind.    Decide what’s important. Your routine can include a lot of different behaviors, so take some time to decide what your priorities are. Are you trying to eat better? Perhaps start by meal prepping on Sundays, making a balanced breakfast each morning or drinking at least three cups of water before lunchtime.    Determine where your routines should fit in. It’s sometimes obvious where in your lifestyle you need to develop healthier routines. It’s also not so obvious some of the time. Take a while to think about what parts of your day need some work. If you struggle to wake up or feel motivated in the morning, perhaps you could add a quick 10-minute walk to your routine. If your lunch breaks are usually spent sitting down at a desk or table, perhaps you could plan a short walk for this time instead.    Give your routine a try. Try your new routine for a few days and see how it’s working. If all is going well, keep your routine up. Eventually, it will become habitual and a lot easier to stick to. If your routine isn’t working out, don’t feel any shame! Modify it and keep  experimenting.    Don’t get overwhelmed. Building a routine that works for you can take time. Don’t expect for it to happen overnight. Give yourself some time to practice and build consistency. Save plenty of room for modification and for celebrating success!    Routines That Can Improve Your Health  Need a few ideas for routines that can help you improve your health? Try one or more of the tips below and see the difference they can make in your life.    Get up a little earlier. Mornings can be stressful when we rush to get everything together for the day. We’ve all had moments where chaotic mornings make it hard to even walk out the door! Having a few extra minutes in the morning can be a game-changer. Try setting your alarm for 30 minutes earlier to allow yourself time to sip on coffee, review your agenda, throw some dinner in a crock pot, write in a journal, or do whatever you need to do you can start your day on a positive note.    Prioritize sleep. People function better when they’re well-rested. Sleep should be a priority in your life, but many of us are guilty of letting ours fall to the wayside. Try making a bedtime ritual to ensure you are getting seven to eight hours of sleep each night. You will feel more energized and focused.    Make a plan. Writing a to-do list in the morning or the night before can help you stay on top of your responsibilities. Being able to see all your tasks in one place can help you stay focused and organized. For bonus points, delegate a task or two to your family. If someone else can do the grocery shopping while you run other errands or fold laundry, this can save you a lot of time and stress!     Don’t feel ashamed to ask for help.  We all have different personalities, schedules, likes and dislikes. Each one of our routines will be different, so try not to compare yourself to others. What will yours look like?      Medication use and SEs reviewed with patient.    Return in  about 3 months (around 6/4/2024) for weight management via clinic or VV.    Patient verbalizes understanding.

## 2024-03-04 ENCOUNTER — OFFICE VISIT (OUTPATIENT)
Dept: INTERNAL MEDICINE CLINIC | Facility: CLINIC | Age: 44
End: 2024-03-04
Payer: COMMERCIAL

## 2024-03-04 VITALS
SYSTOLIC BLOOD PRESSURE: 124 MMHG | BODY MASS INDEX: 29.71 KG/M2 | OXYGEN SATURATION: 97 % | DIASTOLIC BLOOD PRESSURE: 84 MMHG | WEIGHT: 174 LBS | RESPIRATION RATE: 16 BRPM | HEART RATE: 80 BPM | HEIGHT: 64 IN

## 2024-03-04 DIAGNOSIS — K59.04 CHRONIC IDIOPATHIC CONSTIPATION: ICD-10-CM

## 2024-03-04 DIAGNOSIS — Z51.81 ENCOUNTER FOR THERAPEUTIC DRUG MONITORING: Primary | ICD-10-CM

## 2024-03-04 DIAGNOSIS — E66.9 CLASS 1 OBESITY WITHOUT SERIOUS COMORBIDITY WITH BODY MASS INDEX (BMI) OF 30.0 TO 30.9 IN ADULT, UNSPECIFIED OBESITY TYPE: ICD-10-CM

## 2024-03-04 DIAGNOSIS — E55.9 VITAMIN D DEFICIENCY: ICD-10-CM

## 2024-03-04 DIAGNOSIS — R63.8 CRAVING FOR PARTICULAR FOOD: ICD-10-CM

## 2024-03-04 PROCEDURE — 3079F DIAST BP 80-89 MM HG: CPT | Performed by: NURSE PRACTITIONER

## 2024-03-04 PROCEDURE — 3074F SYST BP LT 130 MM HG: CPT | Performed by: NURSE PRACTITIONER

## 2024-03-04 PROCEDURE — 99213 OFFICE O/P EST LOW 20 MIN: CPT | Performed by: NURSE PRACTITIONER

## 2024-03-04 PROCEDURE — 3008F BODY MASS INDEX DOCD: CPT | Performed by: NURSE PRACTITIONER

## 2024-03-04 RX ORDER — FLUOXETINE HYDROCHLORIDE 20 MG/1
20 CAPSULE ORAL DAILY
Qty: 90 CAPSULE | Refills: 1 | Status: SHIPPED | OUTPATIENT
Start: 2024-03-04

## 2024-03-04 RX ORDER — TIRZEPATIDE 10 MG/.5ML
10 INJECTION, SOLUTION SUBCUTANEOUS WEEKLY
Qty: 2 ML | Refills: 2 | Status: SHIPPED | OUTPATIENT
Start: 2024-03-04

## 2024-03-04 NOTE — PATIENT INSTRUCTIONS
Continue making lifestyle changes that focus on good nutrition, regular exercise and stress management.    Medication Plan: Continue current medication regimen aside from increase Metformin ER to 2 tabs (1500 mg) daily and finish out Wegovy and then switch to Zepbound 10 mg weekly. If <5# weight loss in 1st month contact office for dose increase. Visit the website www.zepbound.[employer] for coupon and further education on dosing. This medication may require a prior authorization (PA) by your insurance. A PA may take one week plus to complete and our office will be in touch during this process if needed. If cost prohibitive plan: resume Wegovy.    Tips while taking an injectable weight loss medication:    Be an intuitive eater. Listen to your hunger and fullness signals, stopping when you are full.  Consume protein and produce in your day, striving for a rainbow of color of produce.  Reduce portions to staring size of 1 cup size and check in with your gut to see if you are full. Use a sand timer to slow down your eating pace to allow for 15-20 minutes to complete a meal.  Reduce refined sugars and high fat foods, as they may contribute to greater side effects of nausea and heartburn.  Stop eating 3 hours before bedtime to allow your food to digest.  Remain hydrated with water or non caloric and non caffeine beverages.  Use over the counter red lozenge/supplement to help reduce nausea if needed.    Next steps to work on before next office visit include:  Set a healthy routine by establishing a regular bedtime and/or consistent strive for 7 hours of sleep.  Establish 2x/week for fitness and check on  coupon for Linzess.    Building a Healthy Routine for a Healthy Lifestyle  March 3, 2023  Posted in Blog, Motivation, Support  By Your Weight Matters Campaign    There are some people who seem to get everything done. They stay on top of tasks at work, manage their health and even spend time doing family  activities. These people make it seem easy as they effortlessly go through life without missing a beat. Others seem to always be playing catch-up, feeling constantly behind and consistently stressed.    What’s the difference between these two people? Building a healthy routine.    What is a Routine?  A routine is a set of actions you do over and over again, consistently. Most people have many routines in their lives. For example, we make routines out of making coffee in the morning and driving to work. Turning on your computer and checking emails as your first work task is also a routine. These are things you do automatically as part of your day.    However, not all routines are healthy. For instance, you can make an unhealthy routine out of going through the drive-thru for breakfast each morning. Maybe you routinely spend an hour or more watching television on the couch each night.    If you find yourself caught in one or more unhealthy routines, take some time to see how you can improve your lifestyle. Building a set of healthy routines throughout your day can make a big difference in both your physical and mental health.    Tips for Building a Healthy Routine  How do you get started? Building a healthy routine can be overwhelming to think about in the beginning stages, so keep these fundamental steps in mind.    Decide what’s important. Your routine can include a lot of different behaviors, so take some time to decide what your priorities are. Are you trying to eat better? Perhaps start by meal prepping on Sundays, making a balanced breakfast each morning or drinking at least three cups of water before lunchtime.    Determine where your routines should fit in. It’s sometimes obvious where in your lifestyle you need to develop healthier routines. It’s also not so obvious some of the time. Take a while to think about what parts of your day need some work. If you struggle to wake up or feel motivated in the morning,  perhaps you could add a quick 10-minute walk to your routine. If your lunch breaks are usually spent sitting down at a desk or table, perhaps you could plan a short walk for this time instead.    Give your routine a try. Try your new routine for a few days and see how it’s working. If all is going well, keep your routine up. Eventually, it will become habitual and a lot easier to stick to. If your routine isn’t working out, don’t feel any shame! Modify it and keep experimenting.    Don’t get overwhelmed. Building a routine that works for you can take time. Don’t expect for it to happen overnight. Give yourself some time to practice and build consistency. Save plenty of room for modification and for celebrating success!    Routines That Can Improve Your Health  Need a few ideas for routines that can help you improve your health? Try one or more of the tips below and see the difference they can make in your life.    Get up a little earlier. Mornings can be stressful when we rush to get everything together for the day. We’ve all had moments where chaotic mornings make it hard to even walk out the door! Having a few extra minutes in the morning can be a game-changer. Try setting your alarm for 30 minutes earlier to allow yourself time to sip on coffee, review your agenda, throw some dinner in a crock pot, write in a journal, or do whatever you need to do you can start your day on a positive note.    Prioritize sleep. People function better when they’re well-rested. Sleep should be a priority in your life, but many of us are guilty of letting ours fall to the wayside. Try making a bedtime ritual to ensure you are getting seven to eight hours of sleep each night. You will feel more energized and focused.    Make a plan. Writing a to-do list in the morning or the night before can help you stay on top of your responsibilities. Being able to see all your tasks in one place can help you stay focused and organized. For bonus  points, delegate a task or two to your family. If someone else can do the grocery shopping while you run other errands or fold laundry, this can save you a lot of time and stress!     Don’t feel ashamed to ask for help.  We all have different personalities, schedules, likes and dislikes. Each one of our routines will be different, so try not to compare yourself to others. What will yours look like?

## 2024-03-15 ENCOUNTER — PATIENT MESSAGE (OUTPATIENT)
Dept: INTERNAL MEDICINE CLINIC | Facility: CLINIC | Age: 44
End: 2024-03-15

## 2024-03-15 NOTE — TELEPHONE ENCOUNTER
From: Gris Fan  To: Trish Hampton  Sent: 3/15/2024 11:03 AM CDT  Subject: Zepbound prescription     Hi there, I just wanted to follow up regarding my Zepbound prescription. I haven’t received it yet.     Is there anything that I need to do on my end?    Thanks    Gris

## 2024-04-08 ENCOUNTER — PATIENT MESSAGE (OUTPATIENT)
Dept: INTERNAL MEDICINE CLINIC | Facility: CLINIC | Age: 44
End: 2024-04-08

## 2024-04-09 RX ORDER — MULTIVIT-MIN/IRON/FOLIC ACID/K 18-600-40
2000 CAPSULE ORAL DAILY
Qty: 30 CAPSULE | Refills: 1 | Status: SHIPPED | OUTPATIENT
Start: 2024-04-09

## 2024-04-09 NOTE — TELEPHONE ENCOUNTER
Requesting Trulance 3mg  LOV: 3/4/24  RTC: 3 months  Last Relevant Labs: na  Filled: 10/19/23 #90 with 0 refills    Future Appointments   Date Time Provider Department Center   7/8/2024  7:00 AM Sari Esquivel DO [facility]

## 2024-04-09 NOTE — TELEPHONE ENCOUNTER
From: Gris Fan  To: Trish Hampton  Sent: 4/8/2024 8:51 PM CDT  Subject: Trulance    Can you call a prescription in for Trulance for [facility]?

## 2024-04-09 NOTE — TELEPHONE ENCOUNTER
LOV: 07/06/23  for: physical  Patient advised to RTC on: n/a   Previous Rx: (Cholecalciferol (VITAMIN D) 50 MCG (2000 UT) Oral Cap) à Sig: Take 1 capsule (2,000 Units total) by mouth daily. Disp # 30 capsule / 1 refills.  LF: 10/21/2020  Next appt: 7/8/24

## 2024-04-11 DIAGNOSIS — Z51.81 ENCOUNTER FOR THERAPEUTIC DRUG MONITORING: ICD-10-CM

## 2024-04-12 RX ORDER — METFORMIN HYDROCHLORIDE 750 MG/1
1500 TABLET, EXTENDED RELEASE ORAL DAILY
Qty: 180 TABLET | Refills: 0 | Status: SHIPPED | OUTPATIENT
Start: 2024-04-12

## 2024-04-12 RX ORDER — PLECANATIDE 3 MG/1
3 TABLET ORAL DAILY
Qty: 90 TABLET | Refills: 1 | Status: SHIPPED | OUTPATIENT
Start: 2024-04-12 | End: 2024-05-12

## 2024-04-12 NOTE — TELEPHONE ENCOUNTER
Requesting   Requested Prescriptions     Pending Prescriptions Disp Refills    METFORMIN  MG Oral Tablet 24 Hr [Pharmacy Med Name: METFORMIN HCL ER TABS 750MG] 180 tablet 3     Sig: TAKE 2 TABLETS DAILY WITH FOOD     LOV: 3/4/24  RTC: 3 months  Filled: 10/4/23 #180 with 1 refills    Future Appointments   Date Time Provider Department Center   7/8/2024  7:00 AM Sari Esquivel DO [facility]

## 2024-04-22 ENCOUNTER — TELEPHONE (OUTPATIENT)
Dept: FAMILY MEDICINE CLINIC | Facility: CLINIC | Age: 44
End: 2024-04-22

## 2024-05-07 DIAGNOSIS — E66.9 CLASS 1 OBESITY WITHOUT SERIOUS COMORBIDITY WITH BODY MASS INDEX (BMI) OF 30.0 TO 30.9 IN ADULT, UNSPECIFIED OBESITY TYPE: ICD-10-CM

## 2024-05-07 DIAGNOSIS — Z51.81 ENCOUNTER FOR THERAPEUTIC DRUG MONITORING: ICD-10-CM

## 2024-05-07 RX ORDER — SEMAGLUTIDE 2.4 MG/.75ML
2.4 INJECTION, SOLUTION SUBCUTANEOUS WEEKLY
Qty: 3 ML | Refills: 0 | OUTPATIENT
Start: 2024-05-07

## 2024-05-07 NOTE — TELEPHONE ENCOUNTER
Requesting   Requested Prescriptions     Pending Prescriptions Disp Refills    WEGOVY 2.4 MG/0.75ML Subcutaneous Solution Auto-injector [Pharmacy Med Name: Wegovy 2.4 Mg/0.75ml Inj Heraclio] 3 mL 0     Sig: Inject 0.75 mL (2.4 mg total) into the skin once a week.     LOV: 3/4/24  RTC: 3 months  Filled: 4/9/24 #9 with 1 refills    Future Appointments   Date Time Provider Department Center   7/8/2024  7:00 AM Sari Esquivel DO [facility]     Refill too soon

## 2024-05-12 ENCOUNTER — PATIENT MESSAGE (OUTPATIENT)
Dept: FAMILY MEDICINE CLINIC | Facility: CLINIC | Age: 44
End: 2024-05-12

## 2024-05-12 DIAGNOSIS — K64.8 OTHER HEMORRHOIDS: Primary | ICD-10-CM

## 2024-05-13 ENCOUNTER — HOSPITAL ENCOUNTER (EMERGENCY)
Facility: HOSPITAL | Age: 44
Discharge: HOME | End: 2024-05-13
Attending: EMERGENCY MEDICINE

## 2024-05-13 ENCOUNTER — OFFICE VISIT (OUTPATIENT)
Dept: FAMILY MEDICINE CLINIC | Facility: CLINIC | Age: 44
End: 2024-05-13
Payer: COMMERCIAL

## 2024-05-13 VITALS
SYSTOLIC BLOOD PRESSURE: 112 MMHG | OXYGEN SATURATION: 98 % | BODY MASS INDEX: 29.37 KG/M2 | TEMPERATURE: 100 F | HEIGHT: 64 IN | HEART RATE: 77 BPM | DIASTOLIC BLOOD PRESSURE: 82 MMHG | WEIGHT: 172 LBS | RESPIRATION RATE: 16 BRPM

## 2024-05-13 VITALS
HEIGHT: 65 IN | DIASTOLIC BLOOD PRESSURE: 88 MMHG | RESPIRATION RATE: 16 BRPM | HEART RATE: 80 BPM | BODY MASS INDEX: 28.66 KG/M2 | WEIGHT: 172 LBS | OXYGEN SATURATION: 100 % | SYSTOLIC BLOOD PRESSURE: 137 MMHG | TEMPERATURE: 97 F

## 2024-05-13 DIAGNOSIS — K64.9 HEMORRHOIDS, UNSPECIFIED HEMORRHOID TYPE: Primary | ICD-10-CM

## 2024-05-13 DIAGNOSIS — K64.5 HEMORRHOID THROMBOSIS: Primary | ICD-10-CM

## 2024-05-13 PROCEDURE — 99283 EMERGENCY DEPT VISIT LOW MDM: CPT

## 2024-05-13 RX ORDER — HYDROCORTISONE ACETATE 25 MG/1
25 SUPPOSITORY RECTAL 2 TIMES DAILY PRN
Qty: 12 SUPPOSITORY | Refills: 0 | Status: SHIPPED | OUTPATIENT
Start: 2024-05-13 | End: 2024-06-12

## 2024-05-13 RX ORDER — HYDROCORTISONE 25 MG/G
1 CREAM TOPICAL 2 TIMES DAILY
Qty: 28 G | Refills: 0 | Status: SHIPPED | OUTPATIENT
Start: 2024-05-13 | End: 2024-05-20

## 2024-05-13 RX ORDER — LIDOCAINE HYDROCHLORIDE 20 MG/ML
1 JELLY TOPICAL 2 TIMES DAILY
Qty: 14 ML | Refills: 0 | Status: SHIPPED | OUTPATIENT
Start: 2024-05-13 | End: 2024-05-20

## 2024-05-13 NOTE — PROGRESS NOTES
Gris Fan is a 44 year old female who presents to [facility] with c/o severe rectal pain with bleeding hemorrhoid that has protruded from rectum for 10 days. Patient has tried OTC preparation H, suppositories, ointment, creams, and spray to manage symptoms without relief. Patient has not had hemorrhoid flare up since 11 years ago, after child was born. Discussed with patient severity of symptoms and the inability to rule out malignant etiologies associated in the [facility]. Limitations of the [facility] explained to patient regarding ability to perform required and necessary evaluation and treatments, such as: radiological imaging, EKG testing, laboratory testing, and IVF/medication administration.    Accompanied by: self  After triage, higher acuity of care was recommended to Gris Fan today.   Rationale: Due to limitations of the [facility], patient is advised to go to Emergency Room for further evaluation and treatment.  Site recommendation: [facility]  Patient verbalized understanding of rationale for further evaluation and was stable upon discharge.  Vitals:    05/13/24 1037   BP: 112/82   Pulse: 77   Resp: 16   Temp: 99.7 °F (37.6 °C)

## 2024-05-13 NOTE — DISCHARGE INSTRUCTIONS
Would recommend that you closely follow-up with your primary care physician for general surgery or GI consultation.  Follow-up with your primary care physician.,  If you have increasing pain or discomfort return to the emergency room do sitz bath's when you sit in a bathtub for about 20 minutes he will see some blood if you have blood in your stool you need to return here but it you will see some bright blood that covers the stool which is normal for hemorrhoids.  Use Benefiber or stool softener taking to decrease constipation.  Do not strain.    You were seen in the emergency room in a limited time.  There is a possibility that although we do not see any acute process at this present time that things can change with time.  Is therefore imperative that you follow-up with primary care physician for close follow-up.  If there is any significant progression of your pain  or other symptoms you to return immediately to the emergency room.

## 2024-05-13 NOTE — ED PROVIDER NOTES
Patient Seen in: [facility] Emergency Department      History     Chief Complaint   Patient presents with    Anal Problem     Stated Complaint: rectal bleeding- thrombsed hemmorhoid    Subjective:   HPI    This is a 44-year-old female who was stated that on Thursday she was doing squats with her  and noticed that she started having a getting a hemorrhoid.  She did call her primary care physician who told her to go into the urgent care.  She was seen in the urgent care was sent here for further evaluation.  She states the stools are brown she has had no abdominal pain she knows little blood at hemorrhoid site.  She has had no fevers or chills.  She was sent here for further evaluation.  No other specific complaints.  Not on blood thinners no other specific dizziness lightheadedness.  She been using over-the-counter medications for hemorrhoids.  Objective:   Past Medical History:    Anxiety    Constipation    Decorative tattoo    left thigh    General counseling for prescription of oral contraceptives    Headache disorder    Heavy menses    Irregular bowel habits    Routine Papanicolaou smear    Vaginitis and vulvovaginitis, unspecified    Viral warts, unspecified    Wears glasses    Weight loss              Past Surgical History:   Procedure Laterality Date    Breast surgery  01/01/2001    Colonoscopy  04/12/2010    Colonoscopy      Reduction left      2001    Reduction right                  Social History     Socioeconomic History    Marital status:    Tobacco Use    Smoking status: Never    Smokeless tobacco: Never   Vaping Use    Vaping status: Never Used   Substance and Sexual Activity    Alcohol use: Yes     Comment: Infrequently    Drug use: Never    Sexual activity: Yes     Partners: Male     Birth control/protection: Vasectomy   Other Topics Concern    Caffeine Concern Yes     Comment: 2-3 per week     Exercise No    Seat Belt Yes              Review of Systems    Positive for stated  complaint: rectal bleeding- thrombsed hemmorhoid  Other systems are as noted in HPI.  Constitutional and vital signs reviewed.      All other systems reviewed and negative except as noted above.    Physical Exam     ED Triage Vitals [05/13/24 1127]   BP (!) 138/97   Pulse 80   Resp 17   Temp 97 °F (36.1 °C)   Temp src Temporal   SpO2 100 %   O2 Device None (Room air)       Current Vitals:   Vital Signs  BP: 137/88  Pulse: 80  Resp: 16  Temp: 97 °F (36.1 °C)  Temp src: Temporal  MAP (mmHg): (!) 104    Oxygen Therapy  SpO2: 100 %  O2 Device: None (Room air)            Physical Exam    General: .  Patient is in no respiratory distress.  The patient is in no respiratory distress    HEENT: Atraumatic, conjunctiva are not pale.  There is no icterus.  Oral mucosa Is wet.  No facial trauma.  The neck is supple.    LUNGS: Clear to auscultation, there is no wheezing or retraction.  No crackles.    CV: Cardiovascular is regular without murmurs or rubs.    ABD: The abdomen is soft nondistended nontender.  There is no rebound.  There is no guarding.  The patient rectal exam was done with a female nurse present.  When I did the rectal exam at about 9:00 she has a beta a 2 cm hemorrhoid noted.  There is an area that does look thrombosed that there is 1 area of there is almost a like the skin is a little bit discolored.  There is no findings of redness or cellulitis.  There is no pus or drainage from the site.  There is no obvious internal hemorrhoids that I can see or masses.  EXT: There is good pulses bilaterally.  There is no calf tenderness.  There is no rash noted.  There is no edema    NEURO: Alert and oriented x4.  Muscle strength and sensory exam is grossly normal.  And the patient is neurologically intact with no focal findings.      ED Course   Labs Reviewed - No data to display          The rectal exam was done with a female nurse Marilin as a chaperone.  I did go back and push on the the hemorrhoid itself and  approximately moderate-sized clot was evacuated without actually been doing any incision.  From the skin site it does look like there is a little hole in the site and the skin ruptured and subsequent the clot of was evacuated this swelling had gone down to about 1 cm she felt much better.  I discussed with her that should be followed up with a general surgeon or GI for further evaluation she should not strain use a stool softener or Benefiber.  I recommend also that she does sitz bath so we given a prescription for about the suppository, cream.  I do recommend if she has increasing pain discomfort or fever to return here.  I discussed about drying blood but it is really it is just hemorrhoidal bleeding and she is not on blood thinners she feels comfortable going home.  She does not look ill or toxic.  They at this present time.  Also things to return including increasing pain discomfort swelling or fevers.       MDM                                      Medical Decision Making      Disposition and Plan     Clinical Impression:  1. Hemorrhoids, unspecified hemorrhoid type         Disposition:  Discharge  5/13/2024  1:26 pm    Follow-up:  Sari Esquivel DO  [address] [facility] [address]  [place] [address]  [phone]    Follow up in 2 day(s)      Carlos Pizarro MD  [address] [facility]   [facility] [address]  [phone]    Follow up in 2 day(s)      Guilherme Nguyen MD  [address]  [place] [id number]  [phone]    Follow up in 2 day(s)            Medications Prescribed:  Current Discharge Medication List        START taking these medications    Details   hydrocortisone 25 MG Rectal Suppos Place 1 suppository (25 mg total) rectally 2 (two) times daily as needed for Hemorrhoids.  Qty: 12 suppository, Refills: 0      hydrocortisone 2.5 % External Cream Place 1 Application rectally 2 (two) times daily for 7 days.  Qty: 28 g, Refills: 0      lidocaine urethral/mucosal 2% External Gel Apply 1 mL topically 2 (two)  times daily for 7 days.  Qty: 14 mL, Refills: 0

## 2024-05-13 NOTE — TELEPHONE ENCOUNTER
From: Gris Fan  To: Sari Esquivel  Sent: 5/12/2024 6:01 PM CDT  Subject: Hemorrhoids     Hi Dr. Esquivel - I’ve been dealing with a pretty bad hemorrhoid over the last week in a half. It is really painful and it protrudes outside of my anus. It hurts when I push it back in and it comes right back out. I’ve been treating it with hot baths, ice packs, Preparation H, suppositories, ointment, cream and the spray. It seems to be getting worse and not better.

## 2024-07-08 ENCOUNTER — OFFICE VISIT (OUTPATIENT)
Dept: FAMILY MEDICINE CLINIC | Facility: CLINIC | Age: 44
End: 2024-07-08
Payer: COMMERCIAL

## 2024-07-08 VITALS
HEART RATE: 78 BPM | DIASTOLIC BLOOD PRESSURE: 70 MMHG | SYSTOLIC BLOOD PRESSURE: 122 MMHG | HEIGHT: 64 IN | OXYGEN SATURATION: 100 % | WEIGHT: 179.13 LBS | RESPIRATION RATE: 16 BRPM | BODY MASS INDEX: 30.58 KG/M2

## 2024-07-08 DIAGNOSIS — Z12.4 SCREENING FOR MALIGNANT NEOPLASM OF CERVIX: ICD-10-CM

## 2024-07-08 DIAGNOSIS — R41.3 MEMORY CHANGE: ICD-10-CM

## 2024-07-08 DIAGNOSIS — Z00.00 ROUTINE GENERAL MEDICAL EXAMINATION AT A HEALTH CARE FACILITY: Primary | ICD-10-CM

## 2024-07-08 DIAGNOSIS — Z13.89 SCREENING FOR GENITOURINARY CONDITION: ICD-10-CM

## 2024-07-08 DIAGNOSIS — Z11.3 SCREENING FOR STD (SEXUALLY TRANSMITTED DISEASE): ICD-10-CM

## 2024-07-08 DIAGNOSIS — Z12.31 ENCOUNTER FOR SCREENING MAMMOGRAM FOR MALIGNANT NEOPLASM OF BREAST: ICD-10-CM

## 2024-07-08 DIAGNOSIS — Z00.00 LABORATORY EXAMINATION ORDERED AS PART OF A ROUTINE GENERAL MEDICAL EXAMINATION: ICD-10-CM

## 2024-07-08 DIAGNOSIS — E55.9 VITAMIN D DEFICIENCY: ICD-10-CM

## 2024-07-08 LAB
ALBUMIN SERPL-MCNC: 3.4 G/DL (ref 3.4–5)
ALBUMIN/GLOB SERPL: 0.9 {RATIO} (ref 1–2)
ALP LIVER SERPL-CCNC: 46 U/L
ALT SERPL-CCNC: 17 U/L
ANION GAP SERPL CALC-SCNC: 3 MMOL/L (ref 0–18)
AST SERPL-CCNC: 15 U/L (ref 15–37)
BASOPHILS # BLD AUTO: 0.06 X10(3) UL (ref 0–0.2)
BASOPHILS NFR BLD AUTO: 1.2 %
BILIRUB SERPL-MCNC: 0.4 MG/DL (ref 0.1–2)
BILIRUB UR QL STRIP.AUTO: NEGATIVE
BUN BLD-MCNC: 7 MG/DL (ref 9–23)
CALCIUM BLD-MCNC: 8.8 MG/DL (ref 8.5–10.1)
CHLORIDE SERPL-SCNC: 106 MMOL/L (ref 98–112)
CHOLEST SERPL-MCNC: 151 MG/DL (ref ?–200)
CLARITY UR REFRACT.AUTO: CLEAR
CO2 SERPL-SCNC: 27 MMOL/L (ref 21–32)
CREAT BLD-MCNC: 0.9 MG/DL
EGFRCR SERPLBLD CKD-EPI 2021: 80 ML/MIN/1.73M2 (ref 60–?)
EOSINOPHIL # BLD AUTO: 0.54 X10(3) UL (ref 0–0.7)
EOSINOPHIL NFR BLD AUTO: 10.4 %
ERYTHROCYTE [DISTWIDTH] IN BLOOD BY AUTOMATED COUNT: 14.5 %
EST. AVERAGE GLUCOSE BLD GHB EST-MCNC: 103 MG/DL (ref 68–126)
FASTING PATIENT LIPID ANSWER: YES
FASTING STATUS PATIENT QL REPORTED: YES
GLOBULIN PLAS-MCNC: 3.7 G/DL (ref 2.8–4.4)
GLUCOSE BLD-MCNC: 82 MG/DL (ref 70–99)
GLUCOSE UR STRIP.AUTO-MCNC: NORMAL MG/DL
HBA1C MFR BLD: 5.2 % (ref ?–5.7)
HCT VFR BLD AUTO: 40.4 %
HDLC SERPL-MCNC: 86 MG/DL (ref 40–59)
HGB BLD-MCNC: 13.1 G/DL
IMM GRANULOCYTES # BLD AUTO: 0.01 X10(3) UL (ref 0–1)
IMM GRANULOCYTES NFR BLD: 0.2 %
KETONES UR STRIP.AUTO-MCNC: NEGATIVE MG/DL
LDLC SERPL CALC-MCNC: 52 MG/DL (ref ?–100)
LEUKOCYTE ESTERASE UR QL STRIP.AUTO: 75
LYMPHOCYTES # BLD AUTO: 1.62 X10(3) UL (ref 1–4)
LYMPHOCYTES NFR BLD AUTO: 31.1 %
MCH RBC QN AUTO: 27.9 PG (ref 26–34)
MCHC RBC AUTO-ENTMCNC: 32.4 G/DL (ref 31–37)
MCV RBC AUTO: 86 FL
MONOCYTES # BLD AUTO: 0.34 X10(3) UL (ref 0.1–1)
MONOCYTES NFR BLD AUTO: 6.5 %
NEUTROPHILS # BLD AUTO: 2.64 X10 (3) UL (ref 1.5–7.7)
NEUTROPHILS # BLD AUTO: 2.64 X10(3) UL (ref 1.5–7.7)
NEUTROPHILS NFR BLD AUTO: 50.6 %
NITRITE UR QL STRIP.AUTO: NEGATIVE
NONHDLC SERPL-MCNC: 65 MG/DL (ref ?–130)
OSMOLALITY SERPL CALC.SUM OF ELEC: 279 MOSM/KG (ref 275–295)
PH UR STRIP.AUTO: 6.5 [PH] (ref 5–8)
PLATELET # BLD AUTO: 350 10(3)UL (ref 150–450)
POTASSIUM SERPL-SCNC: 4.6 MMOL/L (ref 3.5–5.1)
PROT SERPL-MCNC: 7.1 G/DL (ref 6.4–8.2)
PROT UR STRIP.AUTO-MCNC: NEGATIVE MG/DL
RBC # BLD AUTO: 4.7 X10(6)UL
SODIUM SERPL-SCNC: 136 MMOL/L (ref 136–145)
SP GR UR STRIP.AUTO: 1.01 (ref 1–1.03)
T PALLIDUM AB SER QL IA: NONREACTIVE
TRIGL SERPL-MCNC: 62 MG/DL (ref 30–149)
TSI SER-ACNC: 2.54 MIU/ML (ref 0.36–3.74)
UROBILINOGEN UR STRIP.AUTO-MCNC: NORMAL MG/DL
VIT D+METAB SERPL-MCNC: 26.5 NG/ML (ref 30–100)
VLDLC SERPL CALC-MCNC: 9 MG/DL (ref 0–30)
WBC # BLD AUTO: 5.2 X10(3) UL (ref 4–11)

## 2024-07-08 PROCEDURE — 81001 URINALYSIS AUTO W/SCOPE: CPT | Performed by: FAMILY MEDICINE

## 2024-07-08 PROCEDURE — 87491 CHLMYD TRACH DNA AMP PROBE: CPT | Performed by: FAMILY MEDICINE

## 2024-07-08 PROCEDURE — 82306 VITAMIN D 25 HYDROXY: CPT | Performed by: FAMILY MEDICINE

## 2024-07-08 PROCEDURE — 86780 TREPONEMA PALLIDUM: CPT | Performed by: FAMILY MEDICINE

## 2024-07-08 PROCEDURE — 87389 HIV-1 AG W/HIV-1&-2 AB AG IA: CPT | Performed by: FAMILY MEDICINE

## 2024-07-08 PROCEDURE — 80050 GENERAL HEALTH PANEL: CPT | Performed by: FAMILY MEDICINE

## 2024-07-08 PROCEDURE — 80061 LIPID PANEL: CPT | Performed by: FAMILY MEDICINE

## 2024-07-08 PROCEDURE — 87591 N.GONORRHOEAE DNA AMP PROB: CPT | Performed by: FAMILY MEDICINE

## 2024-07-08 PROCEDURE — 83036 HEMOGLOBIN GLYCOSYLATED A1C: CPT | Performed by: FAMILY MEDICINE

## 2024-07-08 RX ORDER — TIRZEPATIDE 10 MG/.5ML
10 INJECTION, SOLUTION SUBCUTANEOUS WEEKLY
COMMUNITY
Start: 2024-06-17

## 2024-07-08 NOTE — H&P
CC: Annual Physical Exam    HPI:   Gris Fan is a 44 year old female who presents for a complete physical exam. Symptoms: periods are regular. Patient complains of nothing.        Wt Readings from Last 6 Encounters:   07/08/24 179 lb 2 oz (81.3 kg)   06/14/24 178 lb 3.2 oz (80.8 kg)   05/15/24 172 lb (78 kg)   05/13/24 172 lb (78 kg)   05/13/24 172 lb (78 kg)   03/04/24 174 lb (78.9 kg)     Body mass index is 30.75 kg/m².     Results for orders placed or performed in visit on 07/11/23   CBC W/ DIFFERENTIAL   Result Value Ref Range    WBC 5.0 4.0 - 11.0 x10(3) uL    RBC 4.20 3.80 - 5.30 x10(6)uL    HGB 10.7 (L) 12.0 - 16.0 g/dL    HCT 33.6 (L) 35.0 - 48.0 %    .0 150.0 - 450.0 10(3)uL    MCV 80.0 80.0 - 100.0 fL    MCH 25.5 (L) 26.0 - 34.0 pg    MCHC 31.8 31.0 - 37.0 g/dL    RDW 15.0 %    Neutrophil Absolute Prelim 2.64 1.50 - 7.70 x10 (3) uL    Neutrophil Absolute 2.64 1.50 - 7.70 x10(3) uL    Lymphocyte Absolute 1.67 1.00 - 4.00 x10(3) uL    Monocyte Absolute 0.36 0.10 - 1.00 x10(3) uL    Eosinophil Absolute 0.31 0.00 - 0.70 x10(3) uL    Basophil Absolute 0.05 0.00 - 0.20 x10(3) uL    Immature Granulocyte Absolute 0.01 0.00 - 1.00 x10(3) uL    Neutrophil % 52.4 %    Lymphocyte % 33.1 %    Monocyte % 7.1 %    Eosinophil % 6.2 %    Basophil % 1.0 %    Immature Granulocyte % 0.2 %       Current Outpatient Medications   Medication Sig Dispense Refill    linaCLOtide (LINZESS) 290 MCG Oral Cap Take 1 capsule (290 mcg total) by mouth daily. 8 capsule 0    linaCLOtide (LINZESS) 145 MCG Oral Cap Take 145 mcg by mouth daily. 90 capsule 3    metFORMIN  MG Oral Tablet 24 Hr Take 2 tablets (1,500 mg total) by mouth daily. 180 tablet 0    Cholecalciferol (VITAMIN D) 50 MCG (2000 UT) Oral Cap Take 1 capsule (2,000 Units total) by mouth daily. 30 capsule 1    semaglutide-weight management (WEGOVY) 2.4 MG/0.75ML Subcutaneous Solution Auto-injector Inject 0.75 mL (2.4 mg total) into the skin once a week. 9  mL 1    FLUoxetine 20 MG Oral Cap Take 1 capsule (20 mg total) by mouth daily. 90 capsule 1    PREVIDENT 5000 SENSITIVE 1.1-5 % Dental Gel Use daily      MULTIVITAMIN TAB/CAP Take 1 tablet by mouth daily.      ZEPBOUND 10 MG/0.5ML Subcutaneous Solution Auto-injector Inject 10 mg into the skin once a week. (Patient not taking: Reported on 7/8/2024)        No Known Allergies   Past Medical History:    Anxiety    Constipation    Decorative tattoo    left thigh    General counseling for prescription of oral contraceptives    Headache disorder    Heavy menses    Irregular bowel habits    Routine Papanicolaou smear    Vaginitis and vulvovaginitis, unspecified    Viral warts, unspecified    Wears glasses    Weight loss      Past Surgical History:   Procedure Laterality Date    Breast surgery  01/01/2001    Colonoscopy  04/12/2010    Colonoscopy      Reduction left      2001    Reduction right        Family History   Problem Relation Age of Onset    Cancer Paternal Grandmother         breast    Hormone Disorder Paternal Grandmother     Breast Cancer Paternal Grandmother 57    Hypertension Father     Cancer Sister         lung    Cancer Other     Hormone Disorder Paternal Grandfather     Cancer Maternal Grandmother         breast cancer    Hypertension Mother       Social History:   Social History     Socioeconomic History    Marital status:    Tobacco Use    Smoking status: Never    Smokeless tobacco: Never   Vaping Use    Vaping status: Never Used   Substance and Sexual Activity    Alcohol use: Yes     Comment: Infrequently    Drug use: Never    Sexual activity: Yes     Partners: Male     Birth control/protection: Vasectomy   Other Topics Concern    Caffeine Concern Yes     Comment: 2-3 per week     Exercise No    Seat Belt Yes     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   Exercise:  4 times per week,  2 times a week .  Diet: watches fats closely, watches sugar closely and watches calories  closely     REVIEW OF SYSTEMS:   GENERAL: feels well otherwise  SKIN: denies any unusual skin lesions  EYES:denies blurred vision or double vision  HEENT: denies nasal congestion, sinus pain or ST  LUNGS: denies shortness of breath with exertion  CARDIOVASCULAR: denies chest pain on exertion  GI: denies abdominal pain,denies heartburn  : denies dysuria, vaginal discharge or itching,periods regular  -- Dr. Davis  MUSCULOSKELETAL: denies back pain  NEURO: denies headaches  PSYCHE: denies depression or anxiety  HEMATOLOGIC: denies hx of anemia  ENDOCRINE: denies thyroid history  ALL/ASTHMA: denies hx of allergy or asthma    EXAM:   /70 (BP Location: Left arm, Patient Position: Sitting, Cuff Size: adult)   Pulse 78   Resp 16   Ht 5' 4\" (1.626 m)   Wt 179 lb 2 oz (81.3 kg)   LMP 06/16/2024 (Exact Date)   SpO2 100%   BMI 30.75 kg/m²   Body mass index is 30.75 kg/m².   GENERAL: well developed, well nourished,in no apparent distress  SKIN: no rashes,no suspicious lesions  HEENT: atraumatic, normocephalic,ears clear  EYES:PERRLA, EOMI,conjunctiva are clear  NECK: supple,no adenopathy,no bruits;  No thyromegaly  CHEST: no chest tenderness  BREAST: no dominant or suspicious mass, no nipple discharge; hx of breast reducation  LUNGS: clear to auscultation  CARDIO: RRR without murmur  GI: good BS's,no masses, HSM or tenderness  :introitus is normal,bleeding ,cervix is pink,no adnexal masses or tenderness, PAP was done ; mild cystocele  MUSCULOSKELETAL: back is not tender,FROM of the back  EXTREMITIES: no cyanosis, clubbing or edema  NEURO: Oriented times three,cranial nerves are intact,motor and sensory are grossly intact    ASSESSMENT AND PLAN:   Gris Fan is a 44 year old female who presents for a complete physical exam.   Pap and pelvic done.   Mammo is due.  Saw Dr. Hastings for family history of heart issues and everything wnl.  Self breast exam explained.   Health maintenance, will check fasting  Lipids, CMP, CBC, TSH c ref -- done with Dr. Hastings; vitamin D, HIV.  Going to weight loss clinic -- see Trish Hampton  Colonoscopy 2033.  Advised vitamin D 2000 units daily.      Last dental exam -- 5/2024  Last eye exam -- 12/2024    Encounter Diagnoses   Name Primary?    Routine general medical examination at a health care facility Yes    Encounter for screening mammogram for malignant neoplasm of breast     Vitamin D deficiency     Laboratory examination ordered as part of a routine general medical examination     Screening for genitourinary condition     Screening for STD (sexually transmitted disease)     Screening for malignant neoplasm of cervix     Memory change        Orders Placed This Encounter   Procedures    CBC W Differential W Platelet [E]    Comp Metabolic Panel (14) [E]    Lipid Panel [E]    TSH W Reflex To Free T4 [E]    Vitamin D [E]    Urinalysis, Routine [E]    Hemoglobin A1C [E]    HIV AG AB Combo [E]    T Pallidum Screening Cascade [E]    ThinPrep Pap with HPV Reflex, Chlamydia/GC    Chlamydia/Gc Amplification       Meds & Refills for this Visit:  Requested Prescriptions      No prescriptions requested or ordered in this encounter       Imaging & Consults:  NEURO - INTERNAL  NGUYỄN NOAH 2D+3D SCREENING BILAT (CPT=77067/75423)  Advised neuropsych testing.    Pt' s weight is Body mass index is 30.75 kg/m²., recommended low fat diet and aerobic exercise 30 minutes three times weekly.    The patient indicates understanding of these issues and agrees to the plan.  The patient is asked to return in Return in about 3 months (around 10/8/2024) for neuropsych testing.

## 2024-07-09 DIAGNOSIS — E55.9 VITAMIN D DEFICIENCY: Primary | ICD-10-CM

## 2024-07-09 LAB
C TRACH DNA SPEC QL NAA+PROBE: NEGATIVE
N GONORRHOEA DNA SPEC QL NAA+PROBE: NEGATIVE

## 2024-07-09 RX ORDER — MULTIVIT-MIN/IRON/FOLIC ACID/K 18-600-40
4000 CAPSULE ORAL DAILY
COMMUNITY
Start: 2024-07-09

## 2024-07-10 DIAGNOSIS — Z51.81 ENCOUNTER FOR THERAPEUTIC DRUG MONITORING: ICD-10-CM

## 2024-07-11 NOTE — TELEPHONE ENCOUNTER
Requesting metformin  LOV: 3/4/24  RTC: 3 months  Filled: 4/12/24 #180 with 0 refills    Future Appointments   Date Time Provider Department Center   7/13/2024  8:20 AM HOB NGUYỄN RM1 [facility] [place]   10/9/2024  8:00 AM Sari Esquivel DO [facility] [place]   11/4/2024 10:20 AM Trish Hampton APRN [facility] [id number]

## 2024-07-12 LAB
.: NORMAL
.: NORMAL

## 2024-07-13 ENCOUNTER — HOSPITAL ENCOUNTER (OUTPATIENT)
Dept: MAMMOGRAPHY | Age: 44
Discharge: HOME | End: 2024-07-13
Attending: FAMILY MEDICINE
Payer: COMMERCIAL

## 2024-07-13 DIAGNOSIS — Z12.31 ENCOUNTER FOR SCREENING MAMMOGRAM FOR MALIGNANT NEOPLASM OF BREAST: ICD-10-CM

## 2024-07-13 PROCEDURE — 77067 SCR MAMMO BI INCL CAD: CPT | Performed by: FAMILY MEDICINE

## 2024-07-13 PROCEDURE — 77063 BREAST TOMOSYNTHESIS BI: CPT | Performed by: FAMILY MEDICINE

## 2024-07-13 RX ORDER — METFORMIN HYDROCHLORIDE 750 MG/1
1500 TABLET, EXTENDED RELEASE ORAL DAILY
Qty: 180 TABLET | Refills: 0 | Status: SHIPPED | OUTPATIENT
Start: 2024-07-13

## 2024-07-13 NOTE — TELEPHONE ENCOUNTER
Approved 90 day refill only. Patient over due for f/u. Please advise to add self to wait list, and monitor my schedule daily for openings due to cancellations, most commonly found over the weekend, for a Monday appointment in [place]. I also recommend patient schedules in advance for future appointments, typically after their next appointment, to avoid disruption in their treatment plan. Thank you.

## 2024-07-18 DIAGNOSIS — M26.609 TMJ (TEMPOROMANDIBULAR JOINT DISORDER): ICD-10-CM

## 2024-07-18 RX ORDER — MULTIVIT-MIN/IRON/FOLIC ACID/K 18-600-40
4000 CAPSULE ORAL DAILY
Qty: 180 CAPSULE | Refills: 0 | Status: SHIPPED | OUTPATIENT
Start: 2024-07-18

## 2024-07-18 NOTE — TELEPHONE ENCOUNTER
Last office visit: 7/8/24   Labs last completed: 7/8/24  Requested medication(s) are due for refill today: YES  Requested medication(s) are on the active medication list same strength, form, dose/ sig: YES  Requested medication(s) are managed by provider: YES  Patient has already received a courtsey refill: NO    NOV: 10/9/24  Asked to Return: 10/8/24

## 2024-07-19 RX ORDER — NAPROXEN 500 MG/1
500 TABLET ORAL 2 TIMES DAILY WITH MEALS
Qty: 180 TABLET | Refills: 0 | Status: SHIPPED | OUTPATIENT
Start: 2024-07-19

## 2024-07-19 NOTE — TELEPHONE ENCOUNTER
Last office visit: 7/8/24   Protocol: pass  Requested medication(s) are due for refill today: ys  Requested medication(s) are on the active medication list same strength, form, dose/ sig: no  Requested medication(s) are managed by provider: yes  Patient has already received a courtsey refill: no    NOV: 10/9/24  Last Labs: 7/8/24  Asked to Return: around 10/8/24    Pt requesting Napoxen 500 mg refill  Marked as \"Not Taking\" in 3/4/24 by [facility]    Pended if ok, thanks!

## 2024-07-20 RX ORDER — PLECANATIDE 3 MG/1
1 TABLET ORAL DAILY
Qty: 90 TABLET | Refills: 3 | OUTPATIENT
Start: 2024-07-20

## 2024-07-20 NOTE — TELEPHONE ENCOUNTER
Requesting   Requested Prescriptions     Pending Prescriptions Disp Refills    TRULANCE 3 MG Oral Tab [Pharmacy Med Name: TRULANCE TABS 30'S 3MG] 90 tablet 3     Sig: TAKE 1 TABLET DAILY       LOV: 3/4/24  RTC:   Last Relevant Labs:   Filled: 4/12/24 #90 with 1 refills    Future Appointments   Date Time Provider Department Center   10/9/2024  8:00 AM Sari Esquivel DO [facility] [place]   11/4/2024 10:20 AM Trish Hampton APRN [facility] [id number]

## 2024-08-29 ENCOUNTER — PATIENT MESSAGE (OUTPATIENT)
Dept: INTERNAL MEDICINE CLINIC | Facility: CLINIC | Age: 44
End: 2024-08-29

## 2024-08-29 DIAGNOSIS — E66.9 CLASS 1 OBESITY WITHOUT SERIOUS COMORBIDITY WITH BODY MASS INDEX (BMI) OF 30.0 TO 30.9 IN ADULT, UNSPECIFIED OBESITY TYPE: Primary | ICD-10-CM

## 2024-08-30 RX ORDER — TIRZEPATIDE 10 MG/.5ML
10 INJECTION, SOLUTION SUBCUTANEOUS WEEKLY
Qty: 2 ML | Refills: 1 | Status: SHIPPED | OUTPATIENT
Start: 2024-08-30

## 2024-08-30 NOTE — TELEPHONE ENCOUNTER
From: Gris Fan  To: Trish Barnettdagoberto  Sent: 8/29/2024 4:30 PM CDT  Subject: Zepbound Prescription     My appointment is not until the first week of November. I've been on the cancellation/wait list for several weeks.    Would you be able to send a 3 month prescription for Zepbound to the [facility] in [place]? I need a 3 month supply in order for my  insurance to pay for it. I will be out of Zepbound in a week.    Refills have been requested for the following medications:      ZEPBOUND 10 MG/0.5ML Subcutaneous Solution Auto-injector   Patient Comment: I have been on the cancellation list for several weeks and my appointment isn't until the 1st week of Nov. Can you send a 3 month supply to [facility] so [employer] will cover it.     Preferred pharmacy: [id number] [address] [place] [facility] [place], [phone], [phone]

## 2024-08-30 NOTE — TELEPHONE ENCOUNTER
Requesting zepbound 10 mg  LOV: 3/4/24  RTC: 3 months  Last Relevant Labs: na  Filled: 3/4/24 #2ml with 2 refills  last obtained 6/17/24 for one month per snapshot    Future Appointments   Date Time Provider Department Center   10/9/2024  8:00 AM Sari Esquivel DO [facility] [place]   11/4/2024 10:20 AM Trish Hampton APRN [facility] [id number]

## 2024-09-03 RX ORDER — TIRZEPATIDE 10 MG/.5ML
10 INJECTION, SOLUTION SUBCUTANEOUS WEEKLY
Refills: 0 | OUTPATIENT
Start: 2024-09-03

## 2024-09-14 DIAGNOSIS — Z51.81 ENCOUNTER FOR THERAPEUTIC DRUG MONITORING: ICD-10-CM

## 2024-09-14 DIAGNOSIS — E66.9 CLASS 1 OBESITY WITHOUT SERIOUS COMORBIDITY WITH BODY MASS INDEX (BMI) OF 30.0 TO 30.9 IN ADULT, UNSPECIFIED OBESITY TYPE: ICD-10-CM

## 2024-09-14 DIAGNOSIS — R63.8 CRAVING FOR PARTICULAR FOOD: ICD-10-CM

## 2024-09-15 DIAGNOSIS — R63.8 CRAVING FOR PARTICULAR FOOD: ICD-10-CM

## 2024-09-15 DIAGNOSIS — Z51.81 ENCOUNTER FOR THERAPEUTIC DRUG MONITORING: ICD-10-CM

## 2024-09-15 DIAGNOSIS — E66.9 CLASS 1 OBESITY WITHOUT SERIOUS COMORBIDITY WITH BODY MASS INDEX (BMI) OF 30.0 TO 30.9 IN ADULT, UNSPECIFIED OBESITY TYPE: ICD-10-CM

## 2024-09-16 DIAGNOSIS — E66.9 CLASS 1 OBESITY WITHOUT SERIOUS COMORBIDITY WITH BODY MASS INDEX (BMI) OF 30.0 TO 30.9 IN ADULT, UNSPECIFIED OBESITY TYPE: ICD-10-CM

## 2024-09-16 RX ORDER — TIRZEPATIDE 10 MG/.5ML
INJECTION, SOLUTION SUBCUTANEOUS
Qty: 6 ML | Refills: 0 | OUTPATIENT
Start: 2024-09-16

## 2024-09-16 NOTE — TELEPHONE ENCOUNTER
Requesting zepbound 10  LOV: 3/4/24  RTC: 3 months  Filled: 9/3/24 #6ml with 0 refills    Future Appointments   Date Time Provider Department Center   10/9/2024  8:00 AM Sari Esquivel DO [facility] [place]   11/4/2024 10:20 AM Trish Hampton APRN [facility] [id number]     Too soon for refill

## 2024-09-16 NOTE — TELEPHONE ENCOUNTER
Requesting fluoxetine  LOV: 3/4/24  RTC: 3 months  Filled: 3/4/24 #90 with 1 refills    Future Appointments   Date Time Provider Department Center   10/9/2024  8:00 AM Sari Esquivel DO [facility] 11 [facility] [place]   11/4/2024 10:20 AM Trish Hampton APRN [facility] [id number]

## 2024-09-22 NOTE — PROGRESS NOTES
Gris Fan is a 44 year old female presents today for follow-up on medical weight loss program for the treatment of overweight, obesity, or morbid obesity.    S:  Current weight   Wt Readings from Last 6 Encounters:   09/23/24 177 lb (80.3 kg)   07/08/24 179 lb 2 oz (81.3 kg)   06/14/24 178 lb 3.2 oz (80.8 kg)   05/15/24 172 lb (78 kg)   05/13/24 172 lb (78 kg)   05/13/24 172 lb (78 kg)    AND BMI Body mass index is 29.91 kg/m²..    Patient has lost -3# since LOV in 3/2024. She has been consistent the medication. Feeling stuck at a weight loss plateau for over 1 month. Will need 90 day prescription for Zepbound d/t insurance. Constipation controlled on higher dose of Linzess. Believes she is also started perimenopause with mood changes, sleep issues and heavier menses. Increased stress as a result. Son is also a senior in . Good support of spouse.    Testing/consult completed since LOV: Dietician: 5/2021 with Juan: Estimated current caloric intake: 1200 cals/d, 51 gms protein, 90 gms carb    [facility] Loss Follow Up    Question 9/22/2024  3:23 PM CDT - Filed by Patient   Please describe a success moment: Getting back into the gym   Please describe a challenging moment/needs for improvement: Perimenopause is trying to kill me   Please complete this 24 hour food journal, listing everything you had to eat in the past day. Include the average time of day you ate these meals at    List foods, qty and prep for breakfast: Starbucks egg bites   List foods, qty and prep for lunch. Soup/fries   List foods, qty and prep for dinner. Pan seared chicken and veggies and potatoes   List foods, qty and prep for snacks. Cheese pretzels   List the types and qty of fluids consumed Water - 80 oz   On average, how many meals did you eat out per week? 5   Exercise    How many days per week are you active or exercise 3   On average, how many days were anaerobic (strength/resistance) exercises performed? 1   On  average, how many days were aerobic (cardio) exercises performed? 3   Perceived level of exertion on a scale of 1-5, with 5 being very intense: 3   Stress    Average stress level on a scale of 1-10, with 10 being extremely stressed: 8   If greater than 5/1O how would you grade your coping mechanisms? moderate   Sleep hours and integrity    How many hours of uninterrupted sleep do you get a night: 6   Do you feel rested in the morning: No   If no, what may have been disrupting your sleep? Life   Please list any goal(s) for your next visit To begin losing weight again       Social hx and PMH reviewed. 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  with 2 kids. Spouse in [place] as well- underwent RYGB.    REVIEW OF SYSTEMS:  GENERAL: feels well otherwise  LUNGS: denies shortness of breath with exertion  CARDIOVASCULAR: denies chest pain on exertion, denies palpitations or pedal edema  GI: denies abdominal pain.  No N/V/D, hx of chronic constipation- currently on Trulance, but better success on Linzess  MUSCULOSKELETAL: no acute joint or muscle pain  NEURO: denies headaches or dizziness  PSYCH: denies change in behavior or mood, denies feeling sad or depressed.    EXAM:  /84   Pulse 66   Resp 16   Ht 5' 4.5\" (1.638 m)   Wt 177 lb (80.3 kg)   LMP 06/16/2024 (Exact Date)   BMI 29.91 kg/m²   GENERAL: well developed, well nourished, in no apparent distress, overweight  EYES: conjunctiva pink, sclera non icteric, PERRLA  LUNGS: CTA in all fields, breathing non labored  CARDIO: RRR without murmur, normal S1 and S2 without clicks or gallops, no pedal edema.  GI: +BS  NEURO/MS: motor and sensory grossly intact  PSYCH: pleasant, cooperative, normal mood and affect    ASSESSMENT AND PLAN:  Encounter Diagnoses   Name Primary?    Encounter for therapeutic drug monitoring Yes    Class 1 obesity without serious comorbidity with body mass index (BMI) of 30.0 to 30.9 in adult, unspecified obesity type     Craving  for particular food     Chronic idiopathic constipation          No orders of the defined types were placed in this encounter.      Meds & Refills for this Visit:  Requested Prescriptions     Signed Prescriptions Disp Refills    FLUoxetine 20 MG Oral Cap 90 capsule 1     Sig: Take 1 capsule (20 mg total) by mouth daily.    metFORMIN  MG Oral Tablet 24 Hr 180 tablet 1     Sig: Take 2 tablets (1,500 mg total) by mouth daily.    Tirzepatide-Weight Management (ZEPBOUND) 12.5 MG/0.5ML Subcutaneous Solution Auto-injector 6 mL 0     Sig: Inject 12.5 mg into the skin once a week.       Imaging & Consults:  None      Plan:  Patient has lost -3# since LOV in 3/2024 on Zepbound 10 mg weekly, Metformin ER 1500 mg daily, Fluoxetine 20 mg daily with a total weight loss of 58# since initial consult on 1/14/2020 with initial weight of 228#. Weight loss goal: to improve health and get to a healthier weight. CPM, aside from increase Zepbound as directed. Hx of Topamax and Trokendi XR with SEs, Hx of diethylpropion, phendimetrazine, phentermine, plenity, fluoxetine.  on holiday tips, impact of aging on weight. See patient instructions below for additional plans and patient counseling.      Patient Instructions   Continue making lifestyle changes that focus on good nutrition, regular exercise and stress management.    Medication Plan: Continue current medication regimen aside from increase Zepbound to 12.5 mg weekly. I sent 90 day supply. Send The 5th Quarter message when starting last box to determine next dosing to send to [place].    Tips while taking an injectable weight loss medication:    Be an intuitive eater. Listen to your hunger and fullness signals, stopping when you are full.  Consume protein and produce in your day, striving for a rainbow of color of produce.  Reduce portions to staring size of 1 cup size and check in with your gut to see if you are full. Use a sand timer to slow down your eating pace to allow for 15-20  minutes to complete a meal.  Reduce refined sugars and high fat foods, as they may contribute to greater side effects of nausea and heartburn.  Stop eating 3 hours before bedtime to allow your food to digest.  Remain hydrated with water or non caloric and non caffeine beverages.  Use over the counter red lozenge/supplement to help reduce nausea if needed.    Next steps to work on before next office visit include: Great work getting back into fitness! Some tips/resources for surviving perimenopause and the holidays.    Perimenopause/Menopause and Obesity    The prevalence of obesity, which is closely associated with cardiovascular risk, increases significantly in American women after they reach age 40; the prevalence reaches 65% between 40 and 59 years, and 73.8% in women over age 60.    According to the Healthy Women Study, the average weight gain in perimenopausal women was about five pounds; however, 20% of the population they studied gained 10 pounds or more. Not only is the weight increase from a drop in estrogen but it’s also because of a decrease in energy expenditure. Some women may notice an overall weight gain, whereas others may not see a difference on the scale but may notice that their pants aren’t buttoning as easily. Both are surprising to many women because they may not notice a difference in their dietary intake or activity.    The reasons for increasing obesity in menopausal women are not clear. Some researchers argue that the absence of estrogens may be an important obesity-triggering factor. Estrogen deficiency enhances metabolic dysfunction predisposing to type 2 diabetes mellitus, the metabolic syndrome, and cardiovascular diseases. Estrogen plays a vital role in fat storage and distribution. Before perimenopause, estrogen deposits fat in your thighs, hips, and buttocks. During and after menopause, the drop in estrogen leads to an overall increase in total body fat but now more so in your  midsection. Studies have consistently shown that this waistline increase is different from when you were younger. An increase in visceral (abdominal) fat is linked to an increase in insulin resistance, diabetes, and inflammatory diseases.    Another factor contributing to weight gain in perimenopause may be the increased appetite and calorie intake that occurs in response to hormone changes. In one study, levels of the “hunger hormone” ghrelin were found to be significantly higher in perimenopausal women compared with premenopausal and postmenopausal women.    The low estrogen levels in the late stages of menopause may also impair the function of leptin and neuropeptide Y, hormones that control fullness and appetite. Therefore, women in the late stages of perimenopause who have low estrogen levels may be driven to eat more calories and store fat.    Make weight gain a modifiable risk factor  So, you may be thinking--I’m destined for failure! But this isn’t true. Although the risk of weight gain as a middle-aged woman is higher, this does not mean that it is required. It does mean that we may have to work a little harder to prevent this from happening. It is important to keep in mind that many of the health risks found in the menopause transition are also affected by weight. If we are able to keep a healthy weight, or at least minimize any weight gain, then we are likely to minimize these additional health risks. Now that you know the risks, here are some ways to stay healthy during this midlife transition and avoid a midlife crisis:    Reduce calories. During menopause, our energy expenditure decreases even if our activity level and nutrient intake stays the same. This is secondary to the hormone changes with menopause as well as the natural muscle loss that is occurring. We need about 200 fewer calories in our 50s than we did in our 30s and 40s. This means that we’ve got to move more and eat less to keep our  healthy weight. To help decrease portion sizes, try splitting your meals with a friend, ordering the lighter portion when available, or put half in the takeout box right away. Swap out dessert for fruit or yogurt.   Reduce carbs. Cut back on carbs in order to reduce the increase in belly fat, which drives metabolic problems   Add fiber. Eat a high-fiber diet that includes flaxseeds, which may improve insulin sensitivity.   Work out. Engage in strength training to improve body composition, increase strength, and build and maintain lean muscle. The American Heart Association recommends 150 minutes of moderate exercise per week. Add ANY activity to your day. Strength and resistance training help maintain bone mass. This will help to prevent osteoporosis, which is bone loss that can lead to easy fractures.   Rest and relax. Try to relax before bed and get enough sleep in order to keep your hormones and appetite under control.   Get support and learn to cope without food. Many women (and men) admit to eating under stress. And, let’s face it, middle age can bring some tough times. Children are often departing from the home, and some are returning. Your parents now need more help and guidance. This can be disruptive to our everyday lives. Focus on using nonfood stress relievers. Try going for a walk, deep breathing, or scheduling some “me” time with your favorite book to unwind. Seek out support from friends and loved ones who may have gone through a similar situation.    Taken from The North American Menopause Society  Jolie Santos MD, FACOG, [facility]      Holiday Weight and How to Avoid It    Obesity Action Coalition by Bryanna Kee, PhD  https://www.obesityaction.org/resources/holiday-weight-and-umw-id-dqbpy-it/      When we think about the holidays many things come to mind - gifts, shopping, parties, family, decorating, long to-do lists --and delicious holiday treats.    Strategies for Avoiding Holiday Weight  Gain  The holiday season is a busy time, and there are eating opportunities everywhere we go, such as family gatherings, office parties with trays of home-baked treats in the lunchroom, holiday and tag-um-zhopodeb programs at our kids’ schools, treat samples being given away as we make our way through the stores to do our holiday shopping and catalogs in our mailboxes with mouth-watering photo spreads on every page. We’re really busy, perhaps too busy to prepare the healthy meals we might otherwise prepare.    Here are a few tips that will help you negotiate this joyful time with minimum risk to your weight management goals:    Focus on maintaining your current weight. Challenging yourself to lose weight over the holidays is setting yourself up for failure.  Don’t gorge on any special holiday food because you only get to eat it once a year. With luck, you’ll still be around to enjoy it next year. On the other hand, don’t deprive yourself of anything you want to taste. Instead, take a mindful bite, savoring the sight, taste, aroma, mouth feel and sound of each special holiday treat. Eating like this leads to increased pleasure, quicker satisfaction and decreased risk for weight gain.  Avoid the trap of thinking you can eat what you want because you can just start over in the New Year. It doesn’t get any easier just because it’s January - there are always other reasons to indulge and to celebrate.  Keep up your exercise routine. This will also help reduce holiday stress.  Keep tabs on yourself. Write down what you eat, weigh yourself if you want to or try on your favorite clothes to make sure they still fit.  Create meaning beyond the food by creating new traditions that have nothing to do with food. For example, change “in our family we always have chocolate cinnamon bread with whipped cream on Tram morning” into “in our family we always play in the snow (or on the beach), or go for a long walk/take food and  gifts to the homeless shelter on Tram morning.”  Sometimes, eating a particular food is our way of remembering a lost loved one. If that applies to you, find another way to remember them, like sharing memories with family members.  Remember all the reasons why reaching and maintaining a healthy weight is important to you.  Remember, unless you’re an elite athlete, you’re unlikely to be able to “exercise off” weeks of overindulgence.  Strategies for Holiday Parties  Most of us love holiday parties and look forward to them all year. We get to dress up and go to nice places, spend time with our nearest and dearest, enjoy our favorite holiday music and engage in the traditions that are meaningful to us. Despite all of the excitement, parties can also be minefields when it comes to honoring our healthy lifestyle goals. When we love parties, we may over-indulge as a way of intensifying the positive emotions we’re already feeling, and when we dislike parties, we may over-indulge in an effort to distract ourselves from the emotional discomfort that we’re feeling.    Here are a few tips that will help you get through holiday parties without sabotaging your goals:    Avoid wearing baggy clothing that allows you to expand as you eat.  After you’ve eaten, stay away from the food tables at the party.  Keep your hands busy by finding a way to help out. It’s the best way to distract yourself from the food.  Avoid alcohol. When we drink, we’re more likely to abandon healthy eating.  Fill up with water and other low-calorie drinks.  Take a healthy dish for the pot luck - something you can eat: consider salad, fruit, raw vegetables and a healthy dip.  Focus on your relationships, not on the food - learn to focus on enjoying the people and the special holiday experiences, on building special memories for yourself and your family.  Meeting new people is another good way of distracting yourself from the food. If you’re shy, simply  be a good listener.  Plan ahead. The best kind of plan, when it comes to food, is about what you are going to eat - not about what you’re not going to eat. If we focus on what we can’t eat (or what we think we shouldn’t eat), this kind of thinking can set us up for failure because it simply leaves us feeling deprived.  Don’t arrive completely famished - you’ll be more likely to eat in a way you’ll later regret. Plan to eat on the light side both before and after the event. Think about your meal plan for the day, and leave yourself some room to eat at the party.  Coping with Holiday Stress  As you know, the holiday season can be joyful and stressful at the same time. There’s so much to do, being around family can sometimes be difficult and often, we set ourselves the goal of creating the “perfect” holiday. Being stressed puts us at risk for stress-based eating in an effort to cope.    Here are some strategies you can use to reduce your stress levels:    Focus on what you’re grateful for.  Practice deep breathing whenever you feel overwhelmed.  Keep up your exercise routine.  Remind yourself to do just one thing at a time.  Remember -- you cannot do more than your best.  Be willing to say “no” to some events, tasks or requests. Sometimes this is the best way we can take care of ourselves.  Create a holiday season schedule for yourself. Schedule and prioritize everything you need to get done.  Reduce your expectations - aim for “good enough,” not “perfect.”  If you’re alone during the holidays, pamper yourself and find a way to help others who are less fortunate. This will help reduce your loneliness.  If your relationships with family members are strained, remember that over-indulging in your favorite holiday comfort foods is not going to change how they behave towards you!  Create fun times for yourself. Having fun is a great way of reducing stress!  I hope these tips will help you not just get through the holidays,  but that they’ll allow you to feel reassured that you can still have a fun and meaningful time without having to sacrifice your weight management goals. Wishing you a happy, healthy and meaningful holiday season!        Patient Resources:    Personal Training/Fitness Classes/Health Coaching    [facility] in [place]: Full fitness center with group fitness and personal training located in [place].  Health Coaching with Cherrie Michelle, Alfie Shahid, and Flakito Cross at our [facility]- individual coaching to work on your health goals. Call [phone] and/or email @ [email or web address]. Free 60 minute consult when client of [employer] [facility].  [facility] @ http://www.Brand.net. A variety of group fitness options plus various yoga classes [phone] and/or email Zeinab at [email or web address]  Franc\[facility] [place] [facility]\""ed Fitness Centers with multiple locations: [facility] (www.Yamisee), EMOSpeech Training (www.KnewCoin), i-Neumaticos Body Bootcamp (www.Fanvibep.PearFunds), SeatSwapr (www.Good Deal), The Exercise  (www.exercisecoach.com), Club Pilates (www.clubMaine Maritime Academy.PearFunds)    Online Fitness  Fitness  on Utube  Fit in 10 DVD series   www.nvzfx47MLW.PearFunds  Chair exercises via Sit and Be Fit (www.sitandbefit.org) and LearnSomething (www.[employer].com) or Bryce Pepper or Nino Martinez videos on YouTube.  Hip Hop Fit with Cirilo Fraser at www.hiphopfit.net    Apps for on the Go Fitness  Stevenson 7 Minute Workout (orange box with white 7) - free on the go HIIT training wong  Peloton Wong @ www.onepeloton.com    Nutrition Trackers and Programs  LoseIT! And My Fitness Pal apps and on line for tracking nutrition  NOOM - virtual health coaching  FitFoundation (healthy meals on the go) in [place] @ www.agkijrcczyqin6m.PearFunds  Skylar KIMBLE @ www.bistrTrueInsiderdinTarvo and Kzggvk82 (calorie smart and low carb plans recommended) @  www.jpelic31.com, Metabolic Meals @ www.MyMetabolicMeals.com - individual prepared meals to go  Gobble, Blue Apron, Home , Every Plate, Sunbasket- on line meal delivery programs for preparation at home  [facility] in [place] for homemade meals to go @ www.mealSessionsllage.[facility]  Diet Doctor @ www.dietdoctor.com - low carb swaps  Keyword Rockstar - meal prep and planning mable (www.yummly.com)    Stress, Anxiety, Depression, Trauma  CALM meditation mable (www.calm.com)  Headspace  Don't let anxiety run your life. Using the science of emotion regulation and mindfulness to overcome fear and worry by Tyron Varghese PsyD and Mehdi Ayala MA.  The IO.com Podcast (September 27, 2023): 6 Magic Words That Stop Anxiety  What Happened to You?- a look at the impact trauma has on behavior written by Aisha López and Dr. Cong Macias  Whole Again by Mariano Carlisle - discovering your true self after trauma    Mindful Eating/The Hungry Brain  Am I Hungry? Mindful eating virtual  mable (www.amihungry.com)  The Hungry Brain by Lia Jacobs, PhD  Mindless Eating by Sonu Pagan  Weight Loss Surgery Will Not Treat Food Addiction by Ashley Martínez Ph.D    Metabolic Dysfunction, Hormones and Cravings  Why We Get Sick? By González Mojica (insulin resistance)  Your Body in Balance: The New Science of Food, Hormones, and Health by Dr. Miguel Sewell  The Complete Guide to fasting by Dr. Orellana  Fast Like a Girl by Dr. Magda Bolden  The Menopause Reset by Dr. Magda Bolden  Sugar, Salt & Fat by Halima Tellez, Ph.D, R.D.  The Truth About Sugar - documentary on sugar (Free on Utube, https://youtu.be/1P3hqppVL8a)  Reverse Visceral Fat: #1 Way to Increase Your Lifespan & End Inflammation with Dr. Kirk Bella on Utube @ https://youtu.be/nupPRnvUpJY?si=he7braLmCDK3VseC    Nutrition Support  You Are What You Eat - Netfix series on twin study looking at impact of nutrition changes on health  The End of Dieting: How to Live for Life by Dr. Pandya  RODRÍGUEZ Carrasco or listen to The OneRiot Podcast Episode 63: Understanding \"Nutritarian\" Eating w/Dr. Mumtaz Carrasco  The Game Changers- Netflix Documentary on plant based nutrition  The Dr. Brock T5 Wellness Plan by Dr. Rodrigo Brock MD  The Complete Guide to fasting by Dr. Orellana  @[facility] (InstSt. Anthony's Hospitalam Dietician with support surrounding nutrition and meal prep/planning)    Education, Motivation and Support Resources  Live to 100: Secrets of the Blue Zones - Netflix series on the secrets to communities living over 100 years old  Atomic Habits by Watson Maat (a book about taking small steps to promote greater behavior change)   Motivation mable (black box with white \")- daily supportive messages sent to your phone  Can't Hurt Me by Tyron Escudero (a book exploring the power of discipline in achieving your goals)  Fed Up - documentary about obesity (Free on Utube)  Www.yourweightmatters.org - Obesity Action Coalition sponsored Blog posts  Obesity Action Coalition Resources on topics specific to weight management (www.obesityaction.org)  Fitlosophy Fitspiration - journal to better health (journal book found at [employer] in fitness aisle)  Yassine Sierra talk titled: The Call to Courage (Netflix)  The Exam Room by the Physician's Committee (Podcast)  Nutrition Facts by Dr. Vaughan (Podcast)      Medication use and SEs reviewed with patient.    Return in about 4 months (around 1/23/2025) for weight management via clinic or VV.    Patient verbalizes understanding.      Answers submitted by the patient for this visit:  Medical Weight Loss Follow Up (Submitted on 9/22/2024)  If greater than 5/1O how would you grade your coping mechanisms?: moderate

## 2024-09-23 ENCOUNTER — OFFICE VISIT (OUTPATIENT)
Dept: INTERNAL MEDICINE CLINIC | Facility: CLINIC | Age: 44
End: 2024-09-23
Payer: COMMERCIAL

## 2024-09-23 VITALS
DIASTOLIC BLOOD PRESSURE: 84 MMHG | HEART RATE: 66 BPM | SYSTOLIC BLOOD PRESSURE: 132 MMHG | HEIGHT: 64.5 IN | WEIGHT: 177 LBS | RESPIRATION RATE: 16 BRPM | BODY MASS INDEX: 29.85 KG/M2

## 2024-09-23 DIAGNOSIS — R63.8 CRAVING FOR PARTICULAR FOOD: ICD-10-CM

## 2024-09-23 DIAGNOSIS — E66.9 CLASS 1 OBESITY WITHOUT SERIOUS COMORBIDITY WITH BODY MASS INDEX (BMI) OF 30.0 TO 30.9 IN ADULT, UNSPECIFIED OBESITY TYPE: ICD-10-CM

## 2024-09-23 DIAGNOSIS — Z51.81 ENCOUNTER FOR THERAPEUTIC DRUG MONITORING: ICD-10-CM

## 2024-09-23 DIAGNOSIS — Z51.81 ENCOUNTER FOR THERAPEUTIC DRUG MONITORING: Primary | ICD-10-CM

## 2024-09-23 DIAGNOSIS — K59.04 CHRONIC IDIOPATHIC CONSTIPATION: ICD-10-CM

## 2024-09-23 PROCEDURE — 3075F SYST BP GE 130 - 139MM HG: CPT | Performed by: NURSE PRACTITIONER

## 2024-09-23 PROCEDURE — 99213 OFFICE O/P EST LOW 20 MIN: CPT | Performed by: NURSE PRACTITIONER

## 2024-09-23 PROCEDURE — 3079F DIAST BP 80-89 MM HG: CPT | Performed by: NURSE PRACTITIONER

## 2024-09-23 PROCEDURE — 3008F BODY MASS INDEX DOCD: CPT | Performed by: NURSE PRACTITIONER

## 2024-09-23 RX ORDER — TIRZEPATIDE 12.5 MG/.5ML
12.5 INJECTION, SOLUTION SUBCUTANEOUS WEEKLY
Qty: 6 ML | Refills: 0 | Status: SHIPPED | OUTPATIENT
Start: 2024-09-23

## 2024-09-23 RX ORDER — TIRZEPATIDE 12.5 MG/.5ML
12.5 INJECTION, SOLUTION SUBCUTANEOUS WEEKLY
Qty: 6 ML | Refills: 0 | Status: CANCELLED | OUTPATIENT
Start: 2024-09-23

## 2024-09-23 RX ORDER — TIRZEPATIDE 10 MG/.5ML
10 INJECTION, SOLUTION SUBCUTANEOUS WEEKLY
Qty: 6 ML | Refills: 0 | Status: CANCELLED | OUTPATIENT
Start: 2024-09-23

## 2024-09-23 RX ORDER — METFORMIN HYDROCHLORIDE 750 MG/1
1500 TABLET, EXTENDED RELEASE ORAL DAILY
Qty: 180 TABLET | Refills: 1 | Status: SHIPPED | OUTPATIENT
Start: 2024-09-23

## 2024-09-23 NOTE — PATIENT INSTRUCTIONS
Continue making lifestyle changes that focus on good nutrition, regular exercise and stress management.    Medication Plan: Continue current medication regimen aside from increase Zepbound to 12.5 mg weekly. I sent 90 day supply. Send Renrenmoney message when starting last box to determine next dosing to send to [facility].    Tips while taking an injectable weight loss medication:    Be an intuitive eater. Listen to your hunger and fullness signals, stopping when you are full.  Consume protein and produce in your day, striving for a rainbow of color of produce.  Reduce portions to staring size of 1 cup size and check in with your gut to see if you are full. Use a sand timer to slow down your eating pace to allow for 15-20 minutes to complete a meal.  Reduce refined sugars and high fat foods, as they may contribute to greater side effects of nausea and heartburn.  Stop eating 3 hours before bedtime to allow your food to digest.  Remain hydrated with water or non caloric and non caffeine beverages.  Use over the counter red lozenge/supplement to help reduce nausea if needed.    Next steps to work on before next office visit include: Great work getting back into fitness! Some tips/resources for surviving perimenopause and the holidays.    Perimenopause/Menopause and Obesity    The prevalence of obesity, which is closely associated with cardiovascular risk, increases significantly in American women after they reach age 40; the prevalence reaches 65% between 40 and 59 years, and 73.8% in women over age 60.    According to the Healthy Women Study, the average weight gain in perimenopausal women was about five pounds; however, 20% of the population they studied gained 10 pounds or more. Not only is the weight increase from a drop in estrogen but it’s also because of a decrease in energy expenditure. Some women may notice an overall weight gain, whereas others may not see a difference on the scale but may notice that their pants  aren’t buttoning as easily. Both are surprising to many women because they may not notice a difference in their dietary intake or activity.    The reasons for increasing obesity in menopausal women are not clear. Some researchers argue that the absence of estrogens may be an important obesity-triggering factor. Estrogen deficiency enhances metabolic dysfunction predisposing to type 2 diabetes mellitus, the metabolic syndrome, and cardiovascular diseases. Estrogen plays a vital role in fat storage and distribution. Before perimenopause, estrogen deposits fat in your thighs, hips, and buttocks. During and after menopause, the drop in estrogen leads to an overall increase in total body fat but now more so in your midsection. Studies have consistently shown that this waistline increase is different from when you were younger. An increase in visceral (abdominal) fat is linked to an increase in insulin resistance, diabetes, and inflammatory diseases.    Another factor contributing to weight gain in perimenopause may be the increased appetite and calorie intake that occurs in response to hormone changes. In one study, levels of the “hunger hormone” ghrelin were found to be significantly higher in perimenopausal women compared with premenopausal and postmenopausal women.    The low estrogen levels in the late stages of menopause may also impair the function of leptin and neuropeptide Y, hormones that control fullness and appetite. Therefore, women in the late stages of perimenopause who have low estrogen levels may be driven to eat more calories and store fat.    Make weight gain a modifiable risk factor  So, you may be thinking--I’m destined for failure! But this isn’t true. Although the risk of weight gain as a middle-aged woman is higher, this does not mean that it is required. It does mean that we may have to work a little harder to prevent this from happening. It is important to keep in mind that many of the health  risks found in the menopause transition are also affected by weight. If we are able to keep a healthy weight, or at least minimize any weight gain, then we are likely to minimize these additional health risks. Now that you know the risks, here are some ways to stay healthy during this midlife transition and avoid a midlife crisis:    Reduce calories. During menopause, our energy expenditure decreases even if our activity level and nutrient intake stays the same. This is secondary to the hormone changes with menopause as well as the natural muscle loss that is occurring. We need about 200 fewer calories in our 50s than we did in our 30s and 40s. This means that we’ve got to move more and eat less to keep our healthy weight. To help decrease portion sizes, try splitting your meals with a friend, ordering the lighter portion when available, or put half in the takeout box right away. Swap out dessert for fruit or yogurt.   Reduce carbs. Cut back on carbs in order to reduce the increase in belly fat, which drives metabolic problems   Add fiber. Eat a high-fiber diet that includes flaxseeds, which may improve insulin sensitivity.   Work out. Engage in strength training to improve body composition, increase strength, and build and maintain lean muscle. The American Heart Association recommends 150 minutes of moderate exercise per week. Add ANY activity to your day. Strength and resistance training help maintain bone mass. This will help to prevent osteoporosis, which is bone loss that can lead to easy fractures.   Rest and relax. Try to relax before bed and get enough sleep in order to keep your hormones and appetite under control.   Get support and learn to cope without food. Many women (and men) admit to eating under stress. And, let’s face it, middle age can bring some tough times. Children are often departing from the home, and some are returning. Your parents now need more help and guidance. This can be disruptive to  our everyday lives. Focus on using nonfood stress relievers. Try going for a walk, deep breathing, or scheduling some “me” time with your favorite book to unwind. Seek out support from friends and loved ones who may have gone through a similar situation.    Taken from The North American Menopause Society  Jolie Santos MD, FACOG, [facility]      Holiday Weight and How to Avoid It    Obesity Action Coalition by Bryanna Kee, PhD  https://www.obesityaction.org/resources/holiday-weight-and-txc-bb-slsby-it/      When we think about the holidays many things come to mind - gifts, shopping, parties, family, decorating, long to-do lists --and delicious holiday treats.    Strategies for Avoiding Holiday Weight Gain  The holiday season is a busy time, and there are eating opportunities everywhere we go, such as family gatherings, office parties with trays of home-baked treats in the lunchroom, holiday and zjh-vs-wfqgeclw programs at our kids’ schools, treat samples being given away as we make our way through the stores to do our holiday shopping and catalogs in our mailboxes with mouth-watering photo spreads on every page. We’re really busy, perhaps too busy to prepare the healthy meals we might otherwise prepare.    Here are a few tips that will help you negotiate this joyful time with minimum risk to your weight management goals:    Focus on maintaining your current weight. Challenging yourself to lose weight over the holidays is setting yourself up for failure.  Don’t gorge on any special holiday food because you only get to eat it once a year. With luck, you’ll still be around to enjoy it next year. On the other hand, don’t deprive yourself of anything you want to taste. Instead, take a mindful bite, savoring the sight, taste, aroma, mouth feel and sound of each special holiday treat. Eating like this leads to increased pleasure, quicker satisfaction and decreased risk for weight gain.  Avoid the trap of thinking  you can eat what you want because you can just start over in the New Year. It doesn’t get any easier just because it’s January - there are always other reasons to indulge and to celebrate.  Keep up your exercise routine. This will also help reduce holiday stress.  Keep tabs on yourself. Write down what you eat, weigh yourself if you want to or try on your favorite clothes to make sure they still fit.  Create meaning beyond the food by creating new traditions that have nothing to do with food. For example, change “in our family we always have chocolate cinnamon bread with whipped cream on Tram morning” into “in our family we always play in the snow (or on the beach), or go for a long walk/take food and gifts to the homeless shelter on Tram morning.”  Sometimes, eating a particular food is our way of remembering a lost loved one. If that applies to you, find another way to remember them, like sharing memories with family members.  Remember all the reasons why reaching and maintaining a healthy weight is important to you.  Remember, unless you’re an elite athlete, you’re unlikely to be able to “exercise off” weeks of overindulgence.  Strategies for Holiday Parties  Most of us love holiday parties and look forward to them all year. We get to dress up and go to nice places, spend time with our nearest and dearest, enjoy our favorite holiday music and engage in the traditions that are meaningful to us. Despite all of the excitement, parties can also be minefields when it comes to honoring our healthy lifestyle goals. When we love parties, we may over-indulge as a way of intensifying the positive emotions we’re already feeling, and when we dislike parties, we may over-indulge in an effort to distract ourselves from the emotional discomfort that we’re feeling.    Here are a few tips that will help you get through holiday parties without sabotaging your goals:    Avoid wearing baggy clothing that allows you to  expand as you eat.  After you’ve eaten, stay away from the food tables at the party.  Keep your hands busy by finding a way to help out. It’s the best way to distract yourself from the food.  Avoid alcohol. When we drink, we’re more likely to abandon healthy eating.  Fill up with water and other low-calorie drinks.  Take a healthy dish for the pot luck - something you can eat: consider salad, fruit, raw vegetables and a healthy dip.  Focus on your relationships, not on the food - learn to focus on enjoying the people and the special holiday experiences, on building special memories for yourself and your family.  Meeting new people is another good way of distracting yourself from the food. If you’re shy, simply be a good listener.  Plan ahead. The best kind of plan, when it comes to food, is about what you are going to eat - not about what you’re not going to eat. If we focus on what we can’t eat (or what we think we shouldn’t eat), this kind of thinking can set us up for failure because it simply leaves us feeling deprived.  Don’t arrive completely famished - you’ll be more likely to eat in a way you’ll later regret. Plan to eat on the light side both before and after the event. Think about your meal plan for the day, and leave yourself some room to eat at the party.  Coping with Holiday Stress  As you know, the holiday season can be joyful and stressful at the same time. There’s so much to do, being around family can sometimes be difficult and often, we set ourselves the goal of creating the “perfect” holiday. Being stressed puts us at risk for stress-based eating in an effort to cope.    Here are some strategies you can use to reduce your stress levels:    Focus on what you’re grateful for.  Practice deep breathing whenever you feel overwhelmed.  Keep up your exercise routine.  Remind yourself to do just one thing at a time.  Remember -- you cannot do more than your best.  Be willing to say “no” to some events,  tasks or requests. Sometimes this is the best way we can take care of ourselves.  Create a holiday season schedule for yourself. Schedule and prioritize everything you need to get done.  Reduce your expectations - aim for “good enough,” not “perfect.”  If you’re alone during the holidays, pamper yourself and find a way to help others who are less fortunate. This will help reduce your loneliness.  If your relationships with family members are strained, remember that over-indulging in your favorite holiday comfort foods is not going to change how they behave towards you!  Create fun times for yourself. Having fun is a great way of reducing stress!  I hope these tips will help you not just get through the holidays, but that they’ll allow you to feel reassured that you can still have a fun and meaningful time without having to sacrifice your weight management goals. Wishing you a happy, healthy and meaningful holiday season!        Patient Resources:    Personal Training/Fitness Classes/Health Coaching    [facility] [place]: Full fitness center with group fitness and personal training located in [place].  Health Coaching with Cherrie Michelle, Alfie Shahid, and Flakito Cross at our [facility]- individual coaching to work on your health goals. Call [phone] and/or email @ [email or web address]. Free 60 minute consult when client of [employer] [facility].  [facility] Jayy @ http://[email or web address]. A variety of group fitness options plus various yoga classes [phone] and/or email Zeinab at [email or web address]  [facility] with multiple locations: [employer] Fitness (www.Pinpointe.RAZ Mobile), F45 Training (www.m09hprfxnrj.RAZ Mobile), Zignals Body Bootcamp (www.EcoFactorp.RAZ Mobile), Social IQ (Social Influence Quotient) (www.Wipster.RAZ Mobile), The Exercise  (www.exercisecoach.com), Club Pilates (www.clubpilates.com)    Online Fitness  Fitness  on  Utube  Fit in 10 DVD series   www.hkidc34VHQ.Railpod  Chair exercises via Sit and Be Fit (www.sitandbefit.org) and Valderm (www.Vidit.com) or Bryce Pepper or Nino Martinez videos on YouTube.  Hip Hop Fit with Cirilo Fraser at www.hiphopfit.net    Apps for on the Go Fitness  [place] 7 Minute Workout (orange box with white 7) - free on the go HIIT training wong  Peloton Wong @ www.onepeloton.com    Nutrition Trackers and Programs  LoseIT! And My Fitness Pal apps and on line for tracking nutrition  NOOM - virtual health coaching  FitFoundation (healthy meals on the go) in [place] @ wwwBiostar Pharmaceuticalsgxmrbaylzdfdj7rAquacue  Skylar KIMBLE @ VendormatebistrForemost and Kvebhp02 (calorie smart and low carb plans recommended) @ wwwBiostar Pharmaceuticalsiqbfec02.com, Metabolic Meals @ www.PagerDutyMetabolicMeals.Railpod - individual prepared meals to go  Gobble, Blue Apron, Home , Every Plate, Sunbasket- on line meal delivery programs for preparation at home  Meal Village in [place] for homemade meals to go @ wwwBiostar Pharmaceuticalsmealpickrsetage.Railpod  Diet Doctor @ www.dietdoctor.com - low carb swaps  YuStellar - meal prep and planning wong (www.yummly.com)    Stress, Anxiety, Depression, Trauma  CALM meditation wong (www.calm.com)  Headspace  Don't let anxiety run your life. Using the science of emotion regulation and mindfulness to overcome fear and worry by Tyron Varghese PsyD and Mehdi Ayala MA.  The [employer] Podcast (September 27, 2023): 6 Magic Words That Stop Anxiety  What Happened to You?- a look at the impact trauma has on behavior written by Aisha López and Dr. Cong Macias  Whole Again by Mariano Carlisle - discovering your true self after trauma    Mindful Eating/The Hungry Brain  Am I Hungry? Mindful eating virtual  wong (www.amihungry.com)  The Hungry Brain by Lia Jacobs, PhD  Mindless Eating by Sonu Pagan  Weight Loss Surgery Will Not Treat Food Addiction by Ashley Martínez Ph.D    Metabolic Dysfunction, Hormones and Cravings  Why We Get Sick? By González  Galina (insulin resistance)  Your Body in Balance: The New Science of Food, Hormones, and Health by Dr. Miguel Sewell  The Complete Guide to fasting by Dr. Orellana  Fast Like a Girl by Dr. Magda Bolden  The Menopause Reset by Dr. Magda Bolden  Sugar, Salt & Fat by Halima Tellez, Ph.D, R.D.  The Truth About Sugar - documentary on sugar (Free on Utube, https://youtu.be/8O4imgjGI3n)  Reverse Visceral Fat: #1 Way to Increase Your Lifespan & End Inflammation with Dr. Kirk Bella on Utube @ https://youEspion Limitedu.be/nupPRnvUpJY?si=fy7gjjVnQQR6EurP    Nutrition Support  You Are What You Eat - Netfix series on twin study looking at impact of nutrition changes on health  The End of Dieting: How to Live for Life by Dr. Mumtaz Carrasco M.D. or listen to The [place] Podcast Episode 63: Understanding \"Nutritarian\" Eating w/Dr. Mumtaz Carrasco  The Game Changers- Netflix Documentary on plant based nutrition  The Dr. Brock T5 Wellness Plan by Dr. Rodrigo Brock MD  The Complete Guide to fasting by Dr. Orellana  @[facility] ([facility] Dietician with support surrounding nutrition and meal prep/planning)    Education, Motivation and Support Resources  Live to 100: Secrets of the Blue Zones - Netflix series on the secrets to communities living over 100 years old  Atomic Habits by Watson Mata (a book about taking small steps to promote greater behavior change)   Motivation mable (black box with white \")- daily supportive messages sent to your phone  Can't Hurt Me by Tyron Escudero (a book exploring the power of discipline in achieving your goals)  Fed Up - documentary about obesity (Free on Utube)  Www.yourweightmatters.org - Obesity Action Coalition sponsored Blog posts  Obesity Action Coalition Resources on topics specific to weight management (www.obesityaction.org)  Fitlosophy Fitspiration - journal to better health (journal book found at [employer] in fitness aisle)  Yassine Sierra talk titled: The Call to Courage (Netflix)  The Exam Room by the Physician's  Committee (Podcast)  Nutrition Facts by Dr. Vaughan (Podcast)

## 2024-09-24 ENCOUNTER — PATIENT MESSAGE (OUTPATIENT)
Dept: INTERNAL MEDICINE CLINIC | Facility: CLINIC | Age: 44
End: 2024-09-24

## 2024-09-24 DIAGNOSIS — E66.9 CLASS 1 OBESITY WITHOUT SERIOUS COMORBIDITY WITH BODY MASS INDEX (BMI) OF 30.0 TO 30.9 IN ADULT, UNSPECIFIED OBESITY TYPE: ICD-10-CM

## 2024-09-24 DIAGNOSIS — Z51.81 ENCOUNTER FOR THERAPEUTIC DRUG MONITORING: ICD-10-CM

## 2024-09-24 RX ORDER — TIRZEPATIDE 12.5 MG/.5ML
12.5 INJECTION, SOLUTION SUBCUTANEOUS WEEKLY
Qty: 6 ML | Refills: 0 | Status: SHIPPED | OUTPATIENT
Start: 2024-09-24

## 2024-09-24 NOTE — TELEPHONE ENCOUNTER
From: Gris Fan  To: Trish Hampton  Sent: 9/24/2024 6:46 AM CDT  Subject: Zepbound 12.5    Good morning, can you please send my Zepbound 90 day supply to [facility] in [place]? I said [facility] yesterday by mistake.   I would ask [facility] to transfer the prescription but they aren’t good at following up.  Sorry for the inconvenience.  Gris

## 2024-10-01 ENCOUNTER — TELEPHONE (OUTPATIENT)
Dept: FAMILY MEDICINE CLINIC | Facility: CLINIC | Age: 44
End: 2024-10-01

## 2024-10-01 NOTE — TELEPHONE ENCOUNTER
Pt saw Dr. Murillo 8/22/24  Received results of neuropsych testing and will bring with to appt next week 10/9  Fyi

## 2024-10-09 PROCEDURE — 82306 VITAMIN D 25 HYDROXY: CPT | Performed by: FAMILY MEDICINE

## 2024-10-09 PROCEDURE — 82607 VITAMIN B-12: CPT | Performed by: NURSE PRACTITIONER

## 2024-10-17 ENCOUNTER — PATIENT MESSAGE (OUTPATIENT)
Dept: INTERNAL MEDICINE CLINIC | Facility: CLINIC | Age: 44
End: 2024-10-17

## 2024-10-17 DIAGNOSIS — E66.811 CLASS 1 OBESITY WITHOUT SERIOUS COMORBIDITY WITH BODY MASS INDEX (BMI) OF 30.0 TO 30.9 IN ADULT, UNSPECIFIED OBESITY TYPE: Primary | ICD-10-CM

## 2024-10-18 RX ORDER — TIRZEPATIDE 15 MG/.5ML
15 INJECTION, SOLUTION SUBCUTANEOUS WEEKLY
Qty: 6 ML | Refills: 0 | Status: SHIPPED | OUTPATIENT
Start: 2024-10-18

## 2024-10-18 NOTE — TELEPHONE ENCOUNTER
Last office visit 9/23/24  Zepbound 12.5 mg ordered for 3 months and she cannot continue on this dose without trying to obtain another pa.  Will you allow 15 mg dose she has agreed to for 3 months which should not require any further approval    2/3/2025  8:40 AM Trish Hampton APRN [facility] [id number]     There is current pa in place that is good until November 10.

## 2024-10-28 ENCOUNTER — PATIENT MESSAGE (OUTPATIENT)
Dept: FAMILY MEDICINE CLINIC | Facility: CLINIC | Age: 44
End: 2024-10-28

## 2024-10-30 ENCOUNTER — OFFICE VISIT (OUTPATIENT)
Dept: FAMILY MEDICINE CLINIC | Facility: CLINIC | Age: 44
End: 2024-10-30
Payer: COMMERCIAL

## 2024-10-30 VITALS
SYSTOLIC BLOOD PRESSURE: 110 MMHG | DIASTOLIC BLOOD PRESSURE: 78 MMHG | WEIGHT: 172 LBS | HEART RATE: 81 BPM | BODY MASS INDEX: 29.01 KG/M2 | OXYGEN SATURATION: 99 % | HEIGHT: 64.5 IN | RESPIRATION RATE: 18 BRPM

## 2024-10-30 DIAGNOSIS — N94.6 DYSMENORRHEA: Primary | ICD-10-CM

## 2024-10-30 DIAGNOSIS — R23.2 HOT FLASHES: ICD-10-CM

## 2024-10-30 LAB
ESTRADIOL SERPL-MCNC: 571.3 PG/ML
FSH SERPL-ACNC: 4.2 MIU/ML
PROLACTIN SERPL-MCNC: 25 NG/ML

## 2024-10-30 PROCEDURE — 99213 OFFICE O/P EST LOW 20 MIN: CPT

## 2024-10-30 PROCEDURE — 82670 ASSAY OF TOTAL ESTRADIOL: CPT

## 2024-10-30 PROCEDURE — 3078F DIAST BP <80 MM HG: CPT

## 2024-10-30 PROCEDURE — 3008F BODY MASS INDEX DOCD: CPT

## 2024-10-30 PROCEDURE — 84146 ASSAY OF PROLACTIN: CPT

## 2024-10-30 PROCEDURE — 83001 ASSAY OF GONADOTROPIN (FSH): CPT

## 2024-10-30 PROCEDURE — 3074F SYST BP LT 130 MM HG: CPT

## 2024-10-30 RX ORDER — LINACLOTIDE 145 UG/1
145 CAPSULE, GELATIN COATED ORAL DAILY
COMMUNITY
Start: 2024-10-19

## 2024-10-30 NOTE — PROGRESS NOTES
Subjective:   Gris Fan is a 44 year old female who presents for Menstrual Problem (Pt did not have a full period this perlita, but was spotting everyday from October 16-29. Pt also had one hot flash yesterday. )     Pt has been having very disorganized cycles recently and had her first flash yesterday.     No new GI issues.   Does have diarrhea from increasing zepbound to 15mg. Will see how next 2 weeks go. (Has only taken 2 doses). Will discuss with Trish Hampton at [facility] if she needs to decrease dose or not.   Had hemorrhoid from it and used suppository. Seems to be getting better.       History/Other:    Chief Complaint Reviewed and Verified  Nursing Notes Reviewed and   Verified  Allergies Reviewed  Medications Reviewed         Tobacco:  She has never smoked tobacco.    Current Outpatient Medications   Medication Sig Dispense Refill    LINZESS 145 MCG Oral Cap Take 145 mcg by mouth daily.      hydrocortisone 25 MG Rectal Suppos Place 1 suppository (25 mg total) rectally 2 (two) times daily as needed for Hemorrhoids. 14 suppository 0    hydrocortisone 2.5 % External Cream Place pea size amount to affected area twice daily, as needed. Do not use longer than 7-10 days in a row. 28 g 0    Tirzepatide-Weight Management (ZEPBOUND) 15 MG/0.5ML Subcutaneous Solution Auto-injector Inject 15 mg into the skin once a week. 6 mL 0    Cholecalciferol (VITAMIN D) 50 MCG (2000 UT) Oral Cap Take 2 capsules (4,000 Units total) by mouth daily. 180 capsule 3    FLUoxetine 20 MG Oral Cap Take 1 capsule (20 mg total) by mouth daily. 90 capsule 1    metFORMIN  MG Oral Tablet 24 Hr Take 2 tablets (1,500 mg total) by mouth daily. 180 tablet 1    naproxen 500 MG Oral Tab TAKE 1 TABLET TWICE A DAY WITH MEALS 180 tablet 0    PREVIDENT 5000 SENSITIVE 1.1-5 % Dental Gel Use daily      MULTIVITAMIN TAB/CAP Take 1 tablet by mouth daily.           Review of Systems:  Review of Systems   Genitourinary:  Positive for menstrual  problem.   Neurological:         Hot flash yesterday for first time   All other systems reviewed and are negative.        Objective:   /78 (BP Location: Left arm, Patient Position: Sitting, Cuff Size: adult)   Pulse 81   Resp 18   Ht 5' 4.5\" (1.638 m)   Wt 172 lb (78 kg)   LMP 09/13/2024 (Exact Date)   SpO2 99%   BMI 29.07 kg/m²  Estimated body mass index is 29.07 kg/m² as calculated from the following:    Height as of this encounter: 5' 4.5\" (1.638 m).    Weight as of this encounter: 172 lb (78 kg).  Physical Exam  Vitals reviewed.   Constitutional:       General: She is not in acute distress.     Appearance: Normal appearance. She is not ill-appearing, toxic-appearing or diaphoretic.   HENT:      Head: Normocephalic and atraumatic.   Eyes:      General: No scleral icterus.        Right eye: No discharge.         Left eye: No discharge.      Conjunctiva/sclera: Conjunctivae normal.   Cardiovascular:      Rate and Rhythm: Normal rate and regular rhythm.      Pulses: Normal pulses.      Heart sounds: Normal heart sounds. No murmur heard.     No friction rub. No gallop.   Pulmonary:      Effort: Pulmonary effort is normal. No respiratory distress.      Breath sounds: Normal breath sounds. No stridor. No wheezing, rhonchi or rales.   Chest:      Chest wall: No tenderness.   Musculoskeletal:      Cervical back: Normal range of motion.   Skin:     General: Skin is warm and dry.   Neurological:      General: No focal deficit present.      Mental Status: She is alert and oriented to person, place, and time.   Psychiatric:         Mood and Affect: Mood normal.         Behavior: Behavior normal.         Thought Content: Thought content normal.         Judgment: Judgment normal.         Assessment & Plan:   1. Dysmenorrhea (Primary)  -     Prolactin; Future; Expected date: 10/30/2024  -     Estradiol; Future; Expected date: 10/30/2024  -     FSH; Future; Expected date: 10/30/2024  -     Prolactin  -      Estradiol  -     FSH  2. Hot flashes  -     Prolactin; Future; Expected date: 10/30/2024  -     Estradiol; Future; Expected date: 10/30/2024  -     FSH; Future; Expected date: 10/30/2024  -     Prolactin  -     Estradiol  -     FSH  Discussed option for birth control to control menses. Discussed options for hot flashes/mood changes. Reviewed differences between meds such as Paxil and Veozah.   Will check hormones first and then discuss if she'd like to proceed with treatment after that.         Return in about 8 months (around 7/8/2025) for Physical.  Future Appointments   Date Time Provider Department Center   11/4/2024  8:30 AM Genet Bear LCSW [facility]   11/18/2024  8:30 AM Genet Bear LCSW [facility]   2/3/2025  8:40 AM Trish Hampton APRN [facility] [id number]         STEPHANIE Grant, 10/30/2024, 7:39 AM

## 2025-01-06 DIAGNOSIS — E66.811 CLASS 1 OBESITY WITHOUT SERIOUS COMORBIDITY WITH BODY MASS INDEX (BMI) OF 30.0 TO 30.9 IN ADULT, UNSPECIFIED OBESITY TYPE: ICD-10-CM

## 2025-01-06 NOTE — TELEPHONE ENCOUNTER
Requesting zepbound 15  LOV: 9/23/  RTC: 4 months  Filled: 10/18/24 #6ml with 0 refills    Future Appointments   Date Time Provider Department Center   1/20/2025  8:30 AM Genet Bear LCSW [facility] [place]   2/10/2025  8:30 AM Genet Bear LCSW [facility] [place]   2/24/2025  8:30 AM Genet Bear LCSW [facility] [place]   3/7/2025  9:40 AM Trish Hampton APRN [facility]   6/16/2025  2:00 PM Trish Hampton APRN [id number]

## 2025-01-10 DIAGNOSIS — E66.811 CLASS 1 OBESITY WITHOUT SERIOUS COMORBIDITY WITH BODY MASS INDEX (BMI) OF 30.0 TO 30.9 IN ADULT, UNSPECIFIED OBESITY TYPE: ICD-10-CM

## 2025-01-10 RX ORDER — TIRZEPATIDE 15 MG/.5ML
15 INJECTION, SOLUTION SUBCUTANEOUS WEEKLY
Qty: 6 ML | Refills: 0 | Status: SHIPPED | OUTPATIENT
Start: 2025-01-10

## 2025-01-13 ENCOUNTER — PATIENT MESSAGE (OUTPATIENT)
Dept: INTERNAL MEDICINE CLINIC | Facility: CLINIC | Age: 45
End: 2025-01-13

## 2025-01-13 RX ORDER — TIRZEPATIDE 15 MG/.5ML
15 INJECTION, SOLUTION SUBCUTANEOUS WEEKLY
Qty: 6 ML | Refills: 0 | OUTPATIENT
Start: 2025-01-13

## 2025-01-13 NOTE — TELEPHONE ENCOUNTER
Pa entered in CMM by pharmacy  Filled out and sent most recent note and note prior to any GLP-1 (2021)  Approved for zepbound 15 mg weekly    Gris Mita (Key: [id number])    Approved today by [employer] 2017  CaseId:[id number];Status:Approved;Review Type:Prior Auth;Coverage Start Date:12/14/2024;Coverage End Date:01/13/2026;  Authorization Expiration Date: 1/12/2026

## 2025-01-16 ENCOUNTER — PATIENT MESSAGE (OUTPATIENT)
Dept: FAMILY MEDICINE CLINIC | Facility: CLINIC | Age: 45
End: 2025-01-16

## 2025-01-16 NOTE — TELEPHONE ENCOUNTER
LOV 10/30/24  NOV none    Pt thinks she has yeast infection, has had in past  Requesting rx for treatment  Ok to send, or need appt?

## 2025-01-17 RX ORDER — FLUCONAZOLE 150 MG/1
150 TABLET ORAL ONCE
Qty: 1 TABLET | Refills: 0 | Status: SHIPPED | OUTPATIENT
Start: 2025-01-17 | End: 2025-01-17

## 2025-02-17 DIAGNOSIS — M26.609 TMJ (TEMPOROMANDIBULAR JOINT DISORDER): ICD-10-CM

## 2025-02-17 DIAGNOSIS — E55.9 VITAMIN D DEFICIENCY: ICD-10-CM

## 2025-02-18 RX ORDER — NAPROXEN 500 MG/1
500 TABLET ORAL 2 TIMES DAILY WITH MEALS
Qty: 180 TABLET | Refills: 1 | Status: SHIPPED | OUTPATIENT
Start: 2025-02-18

## 2025-02-18 RX ORDER — ACETAMINOPHEN 160 MG
4000 TABLET,DISINTEGRATING ORAL DAILY
Qty: 180 CAPSULE | Refills: 1 | Status: SHIPPED | OUTPATIENT
Start: 2025-02-18

## 2025-02-18 NOTE — TELEPHONE ENCOUNTER
Last office visit: 10/09/2025   Protocol: pass    Requested medication(s) are due for refill today: Yes    Requested medication(s) are on the active medication list same strength, form, dose/ sig: Yes    Requested medication(s) are managed by provider: Yes    Patient has already received a [facility] refill: No    NOV: none   Asked to Return: 1/9/25

## 2025-03-07 ENCOUNTER — OFFICE VISIT (OUTPATIENT)
Dept: INTERNAL MEDICINE CLINIC | Facility: CLINIC | Age: 45
End: 2025-03-07
Payer: COMMERCIAL

## 2025-03-07 VITALS
HEART RATE: 76 BPM | DIASTOLIC BLOOD PRESSURE: 78 MMHG | BODY MASS INDEX: 29.01 KG/M2 | HEIGHT: 64.5 IN | RESPIRATION RATE: 16 BRPM | WEIGHT: 172 LBS | SYSTOLIC BLOOD PRESSURE: 122 MMHG

## 2025-03-07 DIAGNOSIS — E66.811 CLASS 1 OBESITY WITHOUT SERIOUS COMORBIDITY WITH BODY MASS INDEX (BMI) OF 30.0 TO 30.9 IN ADULT, UNSPECIFIED OBESITY TYPE: ICD-10-CM

## 2025-03-07 DIAGNOSIS — Z51.81 ENCOUNTER FOR THERAPEUTIC DRUG MONITORING: Primary | ICD-10-CM

## 2025-03-07 DIAGNOSIS — R63.8 CRAVING FOR PARTICULAR FOOD: ICD-10-CM

## 2025-03-07 DIAGNOSIS — K59.04 CHRONIC IDIOPATHIC CONSTIPATION: ICD-10-CM

## 2025-03-07 PROCEDURE — 3074F SYST BP LT 130 MM HG: CPT | Performed by: NURSE PRACTITIONER

## 2025-03-07 PROCEDURE — 3078F DIAST BP <80 MM HG: CPT | Performed by: NURSE PRACTITIONER

## 2025-03-07 PROCEDURE — 3008F BODY MASS INDEX DOCD: CPT | Performed by: NURSE PRACTITIONER

## 2025-03-07 PROCEDURE — 99213 OFFICE O/P EST LOW 20 MIN: CPT | Performed by: NURSE PRACTITIONER

## 2025-03-07 RX ORDER — TIRZEPATIDE 15 MG/.5ML
15 INJECTION, SOLUTION SUBCUTANEOUS WEEKLY
Qty: 6 ML | Refills: 1 | Status: SHIPPED | OUTPATIENT
Start: 2025-03-07

## 2025-03-07 RX ORDER — METFORMIN HYDROCHLORIDE 750 MG/1
1500 TABLET, EXTENDED RELEASE ORAL DAILY
Qty: 180 TABLET | Refills: 1 | Status: CANCELLED | OUTPATIENT
Start: 2025-03-07

## 2025-03-07 NOTE — PATIENT INSTRUCTIONS
Continue making lifestyle changes that focus on good nutrition, regular exercise and stress management.    Medication Plan: Continue current medication regimen. Sign up for new 2025 savings card on the Zepbound website.    Tips while taking an injectable medication:    Be an intuitive eater. Listen to your hunger and fullness signals, stopping when you are full.  Consume protein and produce in your day, striving for a rainbow of color of produce.  Reduce portions to staring size of 1 cup size and check in with your gut to see if you are full. Set the timer to slow down your eating pace to allow for 15-20 minutes to complete a meal. Recommend following the \"2 bite rule\".  Reduce refined sugars and high fat foods, as they may contribute to greater side effects of nausea and heartburn.  Stop eating 3 hours before bedtime to allow your food to digest.  Remain hydrated with water or non caloric and non caffeine beverages.  Use over the counter red lozenge/supplement to help reduce nausea if needed.  If you have been off your medication for more than 2 weeks please notify our office to determine next dosing, as return to previous dose may not be appropriate or tolerated.    Next steps to work on before next office visit include: Resume fitness as planned. Some tips and tools below.       the Weights and Get Off the Treadmill for Faster Results    by Sukhjinder Pepper, PhD, CISSN, ACE-CPT at [facility] Resources [email or web address].[email or web address]/pick-[email or web address]-the-weights/    Working out and improving your fitness can seem like a daunting task when you don’t know where to begin, which type of exercise is best, or how long you should work out. I want to help clear up any confusion and tell you what I tell my own clients - strength training is the most important thing you can do. Even if you only have 30 minutes.    The benefits of strength training are tremendous and superior in the long run (pun intended) to  cardiorespiratory exercise. Cardio activities like running, cycling, rowing and others are extremely important, but if you are looking to prioritize what you do for physical activity, strength training is crucial.    What is Strength Training?    Strength training is a physical exercise that uses resistance to build muscular strength and endurance. You can use free weights, exercise machines, resistance bands or your own body weight!    Strength training can help you build muscle mass, increase bone density and improve your metabolism. As a bonus, beginners will have greater gains early on than someone who’s been strength training for many years.    What strength training does to improve your health:    Burn more fat: Muscle is more metabolically active than fat, so the more you have, the more calories you burn all day - even at the same activity level. Don’t believe the myth that a pound of muscle burns 50 calories more a day than fat. The math isn’t accurate; however, muscle does burn more energy and requires more calories during exercise, so moving them more in the gym and throughout your day will burn more calories.    Prevent injury: Strong muscles mean strong, supported bones and connective tissue, which will all help your body withstand higher levels of physical stress without injury. This is a positive side effect at any age, but especially as we get older and are more prone to falling.    Improve overall health: Studies show resistance training can enhance heart and bone health, reduce blood pressure, lower cholesterol, increase bone density, reduce low back pain, improve sleep, and ease symptoms of arthritis and fibromyalgia. Getting older isn’t easy, but the longer you can stay healthy and active, the better.    Improve mood: Strength training releases feel-good endorphins, which reduce anxiety and can even help fight depression. Beyond supporting mental health through endorphins, strength training will  help improve your self-esteem and body image.    Now that you know the numerous benefits of strength training, the next step is getting started. Always start with the basics and remember that it’s better to establish a healthy habit than it is to worry about being perfect. The perfect workout is the one that is right for you at this moment in your life.    Here are some simple steps to get you started:    Start with bodyweight exercises like squats, lunges, push-ups and planks.  Gradually add weight to your routine as you get stronger.  Focus on compound exercises that work multiple muscle groups at once. Compound exercises involve more than one joint. For example, a squat involves the hip, knee and ankle.  Aim for 2-3 strength training sessions per week.  If you are relatively new to the gym life, you may feel a bit intimidated or even uncomfortable with strength training. Don’t let ‘gym-timidation’ take away from your fitness goals. There are many ways to overcome that feeling and crush those workouts.    Start small: Begin with short workouts at home or outside.    Find a workout ace: Partner up to make workouts more enjoyable and less intimidating.    Try group fitness classes: Find your favorite class led by an instructor who can motivate and guide you through your workout. Group classes are great for beginners because you don’t feel like you are struggling alone. You may even find a workout partner for future sessions.    Hire a : Invest in yourself and hire a  for customized training sessions to help meet your needs and goals. You can even hire me to help you get comfortable with those home strength-training workouts.    If going to the gym seems like an impossibility at this point, don’t worry -- you’re not alone. About one-third of respondents in a recent study said they’re too self-conscious to join a gym. If this sounds like you, I’d recommend you visit and ‘interview’  several gyms or fitness studios before deciding that going to a gym isn’t for you. Visit at a time you would normally go to see how crowded it is, what they offer, what amenities they have, and whether they specialize in specific activities such as strength training or boxing.      What are proteins?  Proteins are one of three primary macronutrients that provide energy to the human body, along with fats and carbohydrates. Proteins are also responsible for a large portion of the work that is done in cells; they are necessary for proper structure and function of tissues and organs, and also act to regulate them. They are comprised of a number of amino acids that are essential to proper body function, and serve as the building blocks of body tissue.  There are 20 different amino acids in total, and the sequence of amino acids determines a protein's structure and function. While some amino acids can be synthesized in the body, there are 9 amino acids that humans can only obtain from dietary sources (insufficient amounts of which may sometimes result in death), termed essential amino acids. Foods that provide all of the essential amino acids are called complete protein sources, and include both animal (meat, dairy, eggs, fish) as well as plant-based sources (soy, quinoa, buckwheat).    Proteins can be categorized based on the function they provide to the body. Below is a list of some types of proteins:  Antibody--proteins that protect the body from foreign particles, such as viruses and bacteria, by binding to them  Enzyme--proteins that help form new molecules as well as perform the many chemical reactions that occur throughout the body  Messenger--proteins that transmit signals throughout the body to maintain body processes  Structural component--proteins that act as building blocks for cells that ultimately allow the body to move  Transport/storage--proteins that move molecules throughout the body  As can be seen,  proteins have many important roles throughout the body, and as such, it is important to provide sufficient nutrition to the body to maintain healthy protein levels.    How much protein do I need?  The amount of protein that the human body requires daily is dependent on many conditions, including overall energy intake, growth of the individual, and physical activity level. It is often estimated based on body weight, as a percentage of total caloric intake (10-35%), or based on age alone. 0.8g/kg of body weight is a commonly cited recommended dietary allowance (RDA). This value is the minimum recommended value to maintain basic nutritional requirements, but consuming more protein, up to a certain point, maybe beneficial, depending on the sources of the protein.  The recommended range of protein intake is between 0.8 g/kg and 1.8 g/kg of body weight, dependent on the many factors listed above. People who are highly active, or who wish to build more muscle should generally consume more protein. Some sources suggest consuming between 1.8 to 2 g/kg for those who are highly active. The amount of protein a person should consume, to date, is not an exact science, and each individual should consult a specialist, be it a dietitian, doctor, or , to help determine their individual needs. I recommend your daily protein intake to be 90 grams/day.    Foods high in protein  There are many different combinations of food that a person can eat to meet their protein intake requirements. For many people, a large portion of protein intake comes from meat and dairy, though it is possible to get enough protein while meeting certain dietary restrictions you might have. Generally, it is easier to meet your RDA of protein by consuming meat and dairy, but an excess of either can have a negative health impact. There are plenty of plant-based protein options, but they generally contain less protein in a given serving. Ideally, a  person should consume a mixture of meat, dairy, and plant-based foods in order to meet their RDA and have a balanced diet replete with nutrients.    If possible, consuming a variety of complete proteins is recommended. A complete protein is a protein that contains a good amount of each of the nine essential amino acids required in the human diet. Examples of complete protein foods or meals include:    Meat/Dairy examples  Eggs  Chicken breast  Cottage cheese  Greek yogurt  Milk  Lean beef  Tuna  Turkey breast  Fish  Shrimp    Vegan/plant-based examples  Buckwheat  Hummus and siddhartha  Soy products (tofu, tempeh, edamame beans)  Peanut butter on toast or some other bread  Beans and rice  Quinoa  Hemp and guillermo seeds  Spirulina  Generally, meat, poultry, fish, eggs, and dairy products are complete protein sources. Nuts and seeds, legumes, grains, and vegetables, among other things, are usually incomplete proteins. There is nothing wrong with incomplete proteins however, and there are many healthy, high protein foods that are incomplete proteins. As long as you consume a sufficient variety of incomplete proteins to get all the required amino acids, it is not necessary to specifically eat complete protein foods. In fact, certain high fat red meats for example, a common source of complete proteins, can be unhealthy.   Below are some examples of high protein foods that are not complete proteins:  Almonds  Oats   Broccoli  Lentils  Manny bread  Guillermo seeds  Pumpkin seeds  Peanuts  [place] sprouts  Grapefruit  Green peas  Avocados  Mushrooms  As can be seen, there are many different foods a person can consume to meet their RDA of protein. The examples provided above do not constitute an exhaustive list of high protein or complete protein foods. As with everything else, balance is important, and the examples provided above are an attempt at providing a list of healthier protein options (when consumed in moderation).    Amount  of protein in common food      Protein Amount  Milk (1 cup/8 oz)  8 g  Egg (1 large/50 g)  6 g  Meat (1 slice / 2 oz)  14 g  Seafood (2 oz)  16 g  Bread (1 slice/64 g)   8 g  Corn (1 cup/166 g)  16 g  Rice (1 cup/195 g)  5 g  Dry Bean (1 cup/92 g)   16 g  Nuts (1 cup/92 g)    20 g  Fruits and Veggie (1 cup)  1 g    Data above taken from www.calculator.net    The Power of Protein:    High Protein Foods:  FISH  (3-6 ounces/meal)  All types of fish  Seafood (shrimp, scallops, clams, mussels, lobster)  EGGS     2-3 eggs/meal  DAIRY (2/3 to 1 ½ cup)  Cottage cheese   Greek yogurt  PLANTS (½-3/4 cup/meal)  Legumes: Dried beans and peas (black beans, gill beans, garbanzo beans, kidney, cannellini, navy, split peas, black eyed peas)  Lentils  Quinoa  Soy (edamame, tofu)  PORK   (3-6 oz/meal)  Tenderloin  Pork chop  Top loin roast, boneless  Sirloin roast, boneless  [place] citizen hart (nitrate free)  Boiled deli ham (nitrate free)    Additional Protein Sources:  BEEF    (3-6 oz/meal)  Flank steak       Skirt steak  Bottom round(rump roast), select    Ground beef, 90% lean (ground sirloin)  Benoit eye steak, choice  Eye of round roast, choice   POULTRY  (3-6 oz/meal)  Ground chicken or turkey  Chicken, no skin  Turkey, no skin  PROTEIN SHAKES: OWYN, Koia, and Rebbl (plant based and dairy free), Corepower by [employer], Bijan (plant based option available), Premier Protein, JuicePlus Complete (www.juiceplus.com)  PROTEIN BARS: RXBAR, PowerCrunch, Quest, Barebells, Mosh  SNACK/ON-the-GO OPTIONS: Chomps Meatstick, Oats Overnight (www.oatsovernSpareFoot.[employer]), [place] brand protein granola

## 2025-03-07 NOTE — PROGRESS NOTES
Gris Fan is a 44 year old female presents today for follow-up on medical weight loss program for the treatment of overweight, obesity, or morbid obesity.    S:  Current weight   Wt Readings from Last 6 Encounters:   03/07/25 172 lb (78 kg)   10/30/24 172 lb (78 kg)   10/09/24 174 lb (78.9 kg)   09/23/24 177 lb (80.3 kg)   07/08/24 179 lb 2 oz (81.3 kg)   06/14/24 178 lb 3.2 oz (80.8 kg)    AND BMI Body mass index is 29.07 kg/m²..    Patient has lost 5# since LOV 6 months. She has been consistent with medication and now taking Metformin ER as prescribed. Constipation controlled on LInzess. Just started back to school for 2nd Master's degree in administration. Adjusting to her change in schedule and getting fitness back in place.    Testing/consult completed since LOV: Dietician: 5/2021 with Juan: Estimated current caloric intake: 1200 cals/d, 51 gms protein, 90 gms carb    [facility] Medical Weight Loss Follow Up    Question 3/7/2025  6:16 AM CST - Filed by Patient   Please describe a success moment: Getting back to the gym   Please describe a challenging moment/needs for improvement: Stress, dealing with perimenopause, getting back on a regular schedule at the gym   Please complete this 24 hour food journal, listing everything you had to eat in the past day. Include the average time of day you ate these meals at    List foods, qty and prep for breakfast: Tea with splenda and 3 girl  cookies   List foods, qty and prep for lunch. [place] chicken salad   List foods, qty and prep for dinner. Ramen with vegetables   List foods, qty and prep for snacks.    List the types and qty of fluids consumed 80 oz   On average, how many meals did you eat out per week? 3   Exercise    How many days per week are you active or exercise 1   On average, how many days were anaerobic (strength/resistance) exercises performed? 1   On average, how many days were aerobic (cardio) exercises performed? 1   Perceived level of exertion  on a scale of 1-5, with 5 being very intense: 3   Stress    Average stress level on a scale of 1-10, with 10 being extremely stressed: 9   If greater than 5/1O how would you grade your coping mechanisms? moderate   Sleep hours and integrity    How many hours of uninterrupted sleep do you get a night: 6   Do you feel rested in the morning: No   If no, what may have been disrupting your sleep? Life   Please list any goal(s) for your next visit Just to get back on a good workout gayathri       Social hx and PMH reviewed. 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  with 2 kids. Spouse in [place] as well- underwent RYGB.    REVIEW OF SYSTEMS:  GENERAL: feels well otherwise  LUNGS: denies shortness of breath with exertion  CARDIOVASCULAR: denies chest pain on exertion, denies palpitations or pedal edema  GI: denies abdominal pain.  No N/V/D, hx of chronic constipation- see above  MUSCULOSKELETAL: no acute joint or muscle pain  NEURO: denies headaches or dizziness  PSYCH: denies change in behavior or mood, denies feeling sad or depressed.    EXAM:  /78   Pulse 76   Resp 16   Ht 5' 4.5\" (1.638 m)   Wt 172 lb (78 kg)   LMP 09/13/2024 (Exact Date)   BMI 29.07 kg/m²   GENERAL: well developed, well nourished, in no apparent distress, overweight  EYES: conjunctiva pink, sclera non icteric, PERRLA  LUNGS: CTA in all fields, breathing non labored  CARDIO: RRR without murmur, normal S1 and S2 without clicks or gallops, no pedal edema.  GI: +BS  NEURO/MS: motor and sensory grossly intact  PSYCH: pleasant, cooperative, normal mood and affect    ASSESSMENT AND PLAN:  Encounter Diagnoses   Name Primary?    Encounter for therapeutic drug monitoring Yes    Class 1 obesity without serious comorbidity with body mass index (BMI) of 30.0 to 30.9 in adult, unspecified obesity type     Craving for particular food     Chronic idiopathic constipation            No orders of the defined types were placed in this  encounter.      Meds & Refills for this Visit:  Requested Prescriptions     Signed Prescriptions Disp Refills    FLUoxetine 20 MG Oral Cap 90 capsule 1     Sig: Take 1 capsule (20 mg total) by mouth daily.    Tirzepatide-Weight Management (ZEPBOUND) 15 MG/0.5ML Subcutaneous Solution Auto-injector 6 mL 1     Sig: Inject 15 mg into the skin once a week.       Imaging & Consults:  None      Plan:  Patient has lost 5# since LOV 6 months ago on Zepbound 15 mg weekly, Metformin ER 1500 mg daily, Fluoxetine 20 mg daily with a total weight loss of 56# since initial consult on 1/14/2020 with initial weight of 228#. Weight loss goal: to improve health and get to a healthier weight. CPM. Hx of Topamax and Trokendi XR with SEs, Hx of diethylpropion, phendimetrazine, phentermine, plenity, fluoxetine.  on strength training and recommended protein needs. Schedule with dietician for f/u. See patient instructions below for additional plans and patient counseling.      Patient Instructions   Continue making lifestyle changes that focus on good nutrition, regular exercise and stress management.    Medication Plan: Continue current medication regimen. Sign up for new 2025 savings card on the Zepbound website.    Tips while taking an injectable medication:    Be an intuitive eater. Listen to your hunger and fullness signals, stopping when you are full.  Consume protein and produce in your day, striving for a rainbow of color of produce.  Reduce portions to staring size of 1 cup size and check in with your gut to see if you are full. Set the timer to slow down your eating pace to allow for 15-20 minutes to complete a meal. Recommend following the \"2 bite rule\".  Reduce refined sugars and high fat foods, as they may contribute to greater side effects of nausea and heartburn.  Stop eating 3 hours before bedtime to allow your food to digest.  Remain hydrated with water or non caloric and non caffeine beverages.  Use over the counter  red lozenge/supplement to help reduce nausea if needed.  If you have been off your medication for more than 2 weeks please notify our office to determine next dosing, as return to previous dose may not be appropriate or tolerated.    Next steps to work on before next office visit include: Resume fitness as planned. Some tips and tools below.       the Weights and Get Off the Treadmill for Faster Results    by Sukhjinder Pepper, PhD, CISSN, ACE-CPT at [facility] Resources [email or web address].org/resources/pick-[email or web address]-the-[email or web address]    Working out and improving your fitness can seem like a daunting task when you don’t know where to begin, which type of exercise is best, or how long you should work out. I want to help clear up any confusion and tell you what I tell my own clients - strength training is the most important thing you can do. Even if you only have 30 minutes.    The benefits of strength training are tremendous and superior in the long run (pun intended) to cardiorespiratory exercise. Cardio activities like running, cycling, rowing and others are extremely important, but if you are looking to prioritize what you do for physical activity, strength training is crucial.    What is Strength Training?    Strength training is a physical exercise that uses resistance to build muscular strength and endurance. You can use free weights, exercise machines, resistance bands or your own body weight!    Strength training can help you build muscle mass, increase bone density and improve your metabolism. As a bonus, beginners will have greater gains early on than someone who’s been strength training for many years.    What strength training does to improve your health:    Burn more fat: Muscle is more metabolically active than fat, so the more you have, the more calories you burn all day - even at the same activity level. Don’t believe the myth that a pound of muscle burns 50 calories more a day than fat. The math isn’t  accurate; however, muscle does burn more energy and requires more calories during exercise, so moving them more in the gym and throughout your day will burn more calories.    Prevent injury: Strong muscles mean strong, supported bones and connective tissue, which will all help your body withstand higher levels of physical stress without injury. This is a positive side effect at any age, but especially as we get older and are more prone to falling.    Improve overall health: Studies show resistance training can enhance heart and bone health, reduce blood pressure, lower cholesterol, increase bone density, reduce low back pain, improve sleep, and ease symptoms of arthritis and fibromyalgia. Getting older isn’t easy, but the longer you can stay healthy and active, the better.    Improve mood: Strength training releases feel-good endorphins, which reduce anxiety and can even help fight depression. Beyond supporting mental health through endorphins, strength training will help improve your self-esteem and body image.    Now that you know the numerous benefits of strength training, the next step is getting started. Always start with the basics and remember that it’s better to establish a healthy habit than it is to worry about being perfect. The perfect workout is the one that is right for you at this moment in your life.    Here are some simple steps to get you started:    Start with bodyweight exercises like squats, lunges, push-ups and planks.  Gradually add weight to your routine as you get stronger.  Focus on compound exercises that work multiple muscle groups at once. Compound exercises involve more than one joint. For example, a squat involves the hip, knee and ankle.  Aim for 2-3 strength training sessions per week.  If you are relatively new to the gym life, you may feel a bit intimidated or even uncomfortable with strength training. Don’t let ‘gym-timidation’ take away from your fitness goals. There are many  ways to overcome that feeling and crush those workouts.    Start small: Begin with short workouts at home or outside.    Find a workout ace: Partner up to make workouts more enjoyable and less intimidating.    Try group fitness classes: Find your favorite class led by an instructor who can motivate and guide you through your workout. Group classes are great for beginners because you don’t feel like you are struggling alone. You may even find a workout partner for future sessions.    Hire a : Invest in yourself and hire a  for customized training sessions to help meet your needs and goals. You can even hire me to help you get comfortable with those home strength-training workouts.    If going to the gym seems like an impossibility at this point, don’t worry -- you’re not alone. About one-third of respondents in a recent study said they’re too self-conscious to join a gym. If this sounds like you, I’d recommend you visit and ‘interview’ several gyms or fitness studios before deciding that going to a gym isn’t for you. Visit at a time you would normally go to see how crowded it is, what they offer, what amenities they have, and whether they specialize in specific activities such as strength training or boxing.      What are proteins?  Proteins are one of three primary macronutrients that provide energy to the human body, along with fats and carbohydrates. Proteins are also responsible for a large portion of the work that is done in cells; they are necessary for proper structure and function of tissues and organs, and also act to regulate them. They are comprised of a number of amino acids that are essential to proper body function, and serve as the building blocks of body tissue.  There are 20 different amino acids in total, and the sequence of amino acids determines a protein's structure and function. While some amino acids can be synthesized in the body, there are 9 amino acids that  humans can only obtain from dietary sources (insufficient amounts of which may sometimes result in death), termed essential amino acids. Foods that provide all of the essential amino acids are called complete protein sources, and include both animal (meat, dairy, eggs, fish) as well as plant-based sources (soy, quinoa, buckwheat).    Proteins can be categorized based on the function they provide to the body. Below is a list of some types of proteins:  Antibody--proteins that protect the body from foreign particles, such as viruses and bacteria, by binding to them  Enzyme--proteins that help form new molecules as well as perform the many chemical reactions that occur throughout the body  Messenger--proteins that transmit signals throughout the body to maintain body processes  Structural component--proteins that act as building blocks for cells that ultimately allow the body to move  Transport/storage--proteins that move molecules throughout the body  As can be seen, proteins have many important roles throughout the body, and as such, it is important to provide sufficient nutrition to the body to maintain healthy protein levels.    How much protein do I need?  The amount of protein that the human body requires daily is dependent on many conditions, including overall energy intake, growth of the individual, and physical activity level. It is often estimated based on body weight, as a percentage of total caloric intake (10-35%), or based on age alone. 0.8g/kg of body weight is a commonly cited recommended dietary allowance (RDA). This value is the minimum recommended value to maintain basic nutritional requirements, but consuming more protein, up to a certain point, maybe beneficial, depending on the sources of the protein.  The recommended range of protein intake is between 0.8 g/kg and 1.8 g/kg of body weight, dependent on the many factors listed above. People who are highly active, or who wish to build more muscle  should generally consume more protein. Some sources suggest consuming between 1.8 to 2 g/kg for those who are highly active. The amount of protein a person should consume, to date, is not an exact science, and each individual should consult a specialist, be it a dietitian, doctor, or , to help determine their individual needs. I recommend your daily protein intake to be 90 grams/day.    Foods high in protein  There are many different combinations of food that a person can eat to meet their protein intake requirements. For many people, a large portion of protein intake comes from meat and dairy, though it is possible to get enough protein while meeting certain dietary restrictions you might have. Generally, it is easier to meet your RDA of protein by consuming meat and dairy, but an excess of either can have a negative health impact. There are plenty of plant-based protein options, but they generally contain less protein in a given serving. Ideally, a person should consume a mixture of meat, dairy, and plant-based foods in order to meet their RDA and have a balanced diet replete with nutrients.    If possible, consuming a variety of complete proteins is recommended. A complete protein is a protein that contains a good amount of each of the nine essential amino acids required in the human diet. Examples of complete protein foods or meals include:    Meat/Dairy examples  Eggs  Chicken breast  Cottage cheese  Greek yogurt  Milk  Lean beef  Tuna  Turkey breast  Fish  Shrimp    Vegan/plant-based examples  Buckwheat  Hummus and siddhartha  Soy products (tofu, tempeh, edamame beans)  Peanut butter on toast or some other bread  Beans and rice  Quinoa  Hemp and moncho seeds  Spirulina  Generally, meat, poultry, fish, eggs, and dairy products are complete protein sources. Nuts and seeds, legumes, grains, and vegetables, among other things, are usually incomplete proteins. There is nothing wrong with incomplete proteins  however, and there are many healthy, high protein foods that are incomplete proteins. As long as you consume a sufficient variety of incomplete proteins to get all the required amino acids, it is not necessary to specifically eat complete protein foods. In fact, certain high fat red meats for example, a common source of complete proteins, can be unhealthy.   Below are some examples of high protein foods that are not complete proteins:  Almonds  Oats   Broccoli  Lentils  Manny bread  Guillermo seeds  Pumpkin seeds  Peanuts  Katy sprouts  Grapefruit  Green peas  Avocados  Mushrooms  As can be seen, there are many different foods a person can consume to meet their RDA of protein. The examples provided above do not constitute an exhaustive list of high protein or complete protein foods. As with everything else, balance is important, and the examples provided above are an attempt at providing a list of healthier protein options (when consumed in moderation).    Amount of protein in common food      Protein Amount  Milk (1 cup/8 oz)  8 g  Egg (1 large/50 g)  6 g  Meat (1 slice / 2 oz)  14 g  Seafood (2 oz)  16 g  Bread (1 slice/64 g)   8 g  Corn (1 cup/166 g)  16 g  Rice (1 cup/195 g)  5 g  Dry Bean (1 cup/92 g)   16 g  Nuts (1 cup/92 g)    20 g  Fruits and Veggie (1 cup)  1 g    Data above taken from www.calculator.net    The Power of Protein:    High Protein Foods:  FISH  (3-6 ounces/meal)  All types of fish  Seafood (shrimp, scallops, clams, mussels, lobster)  EGGS     2-3 eggs/meal  DAIRY (2/3 to 1 ½ cup)  Cottage cheese   Greek yogurt  PLANTS (½-3/4 cup/meal)  Legumes: Dried beans and peas (black beans, gill beans, garbanzo beans, kidney, cannellini, navy, split peas, black eyed peas)  Lentils  Quinoa  Soy (edamame, tofu)  PORK   (3-6 oz/meal)  Tenderloin  Pork chop  Top loin roast, boneless  Sirloin roast, boneless  Vincentian hart (nitrate free)  Boiled deli ham (nitrate free)    Additional Protein Sources:  BEEF     (3-6 oz/meal)  Flank steak       Skirt steak  Bottom round(rump roast), select    Ground beef, 90% lean (ground sirloin)  Benoit eye steak, choice  Eye of round roast, choice   POULTRY  (3-6 oz/meal)  Ground chicken or turkey  Chicken, no skin  Turkey, no skin  PROTEIN SHAKES: OWYN, Marlo, and Rebbl (plant based and dairy free), Corepower by [employer]life, Orgain (plant based option available), PremOdojo Protein, JuicePlus Complete (www.juiceplus.com)  PROTEIN BARS: RXBAR, PowerCrunch, Quest, Barebells, Mosh  SNACK/ON-the-GO OPTIONS: Chomps Meatstick, Oats Overnight (www.oatsovernight.com), [place] brand protein granola        Medication use and SEs reviewed with patient.    Return in about 6 months (around 9/7/2025) for weight management via clinic as scheduled.    Patient verbalizes understanding.      Answers submitted by the patient for this visit:  Medical Weight Loss Follow Up (Submitted on 3/7/2025)  If greater than 5/1O how would you grade your coping mechanisms?: moderate

## 2025-03-23 DIAGNOSIS — Z51.81 ENCOUNTER FOR THERAPEUTIC DRUG MONITORING: ICD-10-CM

## 2025-03-23 DIAGNOSIS — E66.811 CLASS 1 OBESITY WITHOUT SERIOUS COMORBIDITY WITH BODY MASS INDEX (BMI) OF 30.0 TO 30.9 IN ADULT, UNSPECIFIED OBESITY TYPE: ICD-10-CM

## 2025-03-24 NOTE — TELEPHONE ENCOUNTER
Requesting metformin  LOV: 3/7/35  RTC: 3 months  Filled: 9/23/25 #180 with 1 refills    Future Appointments   Date Time Provider Department Center   4/7/2025  8:30 AM Genet Bear LCSW [id number]   6/16/2025  2:00 PM Trish Hampton APRN [id number]   9/29/2025  8:00 AM Trish Hampton APRN [id number]   12/8/2025  8:00 AM Trish Hampton APRN [id number]

## 2025-03-27 RX ORDER — METFORMIN HYDROCHLORIDE 750 MG/1
1500 TABLET, EXTENDED RELEASE ORAL
Qty: 180 TABLET | Refills: 1 | Status: SHIPPED | OUTPATIENT
Start: 2025-03-27

## 2025-06-26 NOTE — PROGRESS NOTES
Gris Fan is a 45 year old female presents today for follow-up on medical weight loss program for the treatment of overweight, obesity, or morbid obesity.    S:  Current weight   Wt Readings from Last 6 Encounters:   06/27/25 175 lb (79.4 kg)   03/07/25 172 lb (78 kg)   10/30/24 172 lb (78 kg)   10/09/24 174 lb (78.9 kg)   09/23/24 177 lb (80.3 kg)   07/08/24 179 lb 2 oz (81.3 kg)    AND BMI Body mass index is 29.57 kg/m²..    Patient has lost -3# since LOV 3 months. She has been consistent with medication. Busy schedule with work and school. Hoping to find more time for fitness and meal prep/planning soon. Eating more convenient meals and no exercise. NO lifestyle log completed.    Testing/consult completed since LOV: Dietician: 5/2021 with Juan: Estimated current caloric intake: 1200 cals/d, 51 gms protein, 90 gms carb    Social hx and PMH reviewed. 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  with 2 kids. Spouse in [place] as well- underwent RYGB.    REVIEW OF SYSTEMS:  GENERAL: feels well otherwise  LUNGS: denies shortness of breath with exertion  CARDIOVASCULAR: denies chest pain on exertion, denies palpitations or pedal edema  GI: denies abdominal pain.  No N/V/D, hx of chronic constipation- controlled on Linzess  MUSCULOSKELETAL: no acute joint or muscle pain  NEURO: denies headaches or dizziness  PSYCH: denies change in behavior or mood, denies feeling sad or depressed.    EXAM:  /78   Pulse 76   Resp 18   Ht 5' 4.5\" (1.638 m)   Wt 175 lb (79.4 kg)   LMP 05/13/2025 (Exact Date)   SpO2 98%   BMI 29.57 kg/m²   GENERAL: well developed, well nourished, in no apparent distress, overweight  EYES: conjunctiva pink, sclera non icteric, PERRLA  LUNGS: CTA in all fields, breathing non labored  CARDIO: RRR without murmur, normal S1 and S2 without clicks or gallops, no pedal edema.  GI: +BS  NEURO/MS: motor and sensory grossly intact  PSYCH: pleasant, cooperative, normal mood and  affect    ASSESSMENT AND PLAN:  Encounter Diagnoses   Name Primary?    Encounter for therapeutic drug monitoring Yes    Class 1 obesity without serious comorbidity with body mass index (BMI) of 30.0 to 30.9 in adult, unspecified obesity type     Craving for particular food              No orders of the defined types were placed in this encounter.      Meds & Refills for this Visit:  Requested Prescriptions     Signed Prescriptions Disp Refills    metFORMIN  MG Oral Tablet 24 Hr 180 tablet 1     Sig: Take 2 tablets (1,500 mg total) by mouth daily with food.       Imaging & Consults:  None      Plan:  Patient has lost -3# since LOV 3 months ago on Zepbound 15 mg weekly, Metformin ER 1500 mg daily, Fluoxetine 20 mg daily with a total weight loss of 53# since initial consult on 1/14/2020 with initial weight of 228#. Weight loss goal: to improve health and get to a healthier weight. CPM. Hx of Topamax and Trokendi XR with SEs, Hx of diethylpropion, phendimetrazine, phentermine.  on meal resources, recommended meditation and impact of exercise on weight loss/maintenance. Schedule with dietician for f/u. See patient instructions below for additional plans and patient counseling.      Patient Instructions   Continue making lifestyle changes that focus on good nutrition, regular exercise and stress management.    Medication Plan: Continue current medication regimen.    Tips while taking an injectable medication:    Be an intuitive eater. Listen to your hunger and fullness signals, stopping when you are full.  Consume protein and produce in your day, striving for a rainbow of color of produce.  Reduce portions to staring size of 1 cup size and check in with your gut to see if you are full. Set the timer to slow down your eating pace to allow for 15-20 minutes to complete a meal. Recommend following the \"2 bite rule\".  Reduce refined sugars and high fat foods, as they may contribute to greater side effects of  nausea and heartburn.  Stop eating 3 hours before bedtime to allow your food to digest.  Remain hydrated with water or non caloric and non caffeine beverages.  Use over the counter red lozenge/supplement to help reduce nausea if needed.  If you have been off your medication for more than 2 weeks please notify our office to determine next dosing, as return to previous dose may not be appropriate or tolerated.  Zepbound can be kept at room temperature for up to 3 weeks.    Next steps to work on before next visit include: Great work in maintaining your weight during this busy season of life. Recommend follow up with Juan, our dietician. Consider meal programs like Vivnej54, BistroMD or home cook like Suvie and Tovala. Resources listed below.    Biological Consciousness: Stress Management through Mindfulness and Body Awareness    by Mine Ren, MEd, NFPT, 200-YTT  OAC Winter 2016 @ https://www.obesityaction.org/resources/biological-consciousness-stress-management-through-mindfulness-and-body-awareness/    Stress can adversely affect our health and wellbeing, but what is actually taking place in the body that makes it so worrisome for our health? What exactly is stress, and how does it influence our biological systems? How can a feeling cause an injury or illness? Most importantly, how can chronic stress be managed in order to prevent or reverse negative health effects?    Through mindfulness and breath awareness, the functioning of our entire biological system is shifted, enhancing our mood, decision making skills and improving our chance of experiencing stress. It also improves our perception of stress, or our “health locus of control.”    Nearly all individuals experience acute or chronic mental stress and accept it as a normal part of living in the current day and age. According to the American Psychological Association and the American Acworth of Stress, the amount of perceived stress individuals face has  increased exponentially in the past five years. Among the top stressors are money, work and personal health. In fact, 52 percent of Americans rated their health as a cause of stress.    Not only has the overall health of Americans steadily declined --as shown by recent research that indicates only 40 percent of Americans rated their health as very good or excellent -- but the level of perceived stress has increased three-fold. We have learned we have the greatest influence on our health, happiness and perceived stress than ever thought before.    Chronic stress has the ability to shift our biological functions from a state of wellness and balance, to a state of potential injury, illness and even chronic disease. Stress is commonly thought of as a negative occurrence leading to a range of uncomfortable emotions and symptoms. However, stress can also be the foundation for productivity and innovation. The main distinction lies within our perception and choice responses to the stress.    What is Stress then, If it Can be Both Helpful and Harmful?  Stress can be defined as any situation that disturbs the equilibrium of a living organism (including plants and animals) and forces them to adapt or change. Mental stress can be any situation in which environmental demands - internal demands or both - tax or exceed the adaptive resources of an individual, social system or physical tissue system.    Mental Stress  Mental stress is an internal experience; an experience that interferes with the entirety of our physiological makeup. Not only does stress challenge our mental capacity to adapt, as well as our current state of happiness, it also directly influences our homeostasis (balance). An individual’s belief in the dominance they have over stress also plays a major factor in mental, physical and emotional responses to stress.    These beliefs are what form an individual’s “health locus of control” (the perceived control over  health outcomes). Those with a high external health locus of control perceive stress and their response to stress as being out of their control, whereas those with a high internal health locus of control believe that stress responses are influenced at their will. Studies indicate that individuals who have a high external locus of control experience worse health outcomes than those who feel that their health is within their control.    In order for the physical effects of mental stress to be understood, we must start within our internal make up, originating with the tiniest of cells. The human body is made up of trillions of cells, each conducting a magnetic field of energy. The energetic pulse of excitable tissue (i.e. nerves, muscles, etc.) is essentially what brings life to our bodies. Internally, this force field stimulates the function of our metabolism, digestive system, heart rate, hormone production, immunity and even our nervous system’s responses.    The consequences of chronic stress on our energy systems can lead to unfavorable health effects. Symptoms of stress such as elevated heart rate, increased blood pressure, slowed metabolism, tension headaches and anxiety can lead to more severe chronic illnesses. Maintaining a constant level of stress for an extended period of time can trigger adverse health outcomes such as heart disease, weight gain, chronic migraines and even cancer, to name a few.    Physical Stress  So, how does a feeling resonate physically in the body, and what is taking place to decrease our health? The beginning of our internal reactions to stress originates in the brain. The hypothalamus, located in the lower midbrain, is responsible for our primal and instinctual responses. When under stress, the hypothalamus first alarms the body of potential harm by sending nerve signals to the adrenal glands, located above the kidneys. These glands then release a surge of hormones including  adrenaline (epinephrine), and cortisol. This communication prompts a series of physical responses including elevated heart rate, increased skeletal muscle blood flow and behavior activation.    Our primal instincts driven by the lower-mid brain take over functioning and instinctual decision making through the influence of the sympathetic nervous system (SNS) (which is responsible for the ‘fight or flight’ response). When operating through the SNS, numerous bodily systems are shut down to focus only on necessary functions. For instance, thyroid functioning is working at a slower pace, insulin production is decreased and blood flow to the digestive system is less, resulting in slower metabolism and reduced nutritional intake. When cortisol is being released during this state, fat storage is increased in order to ensure survival.    This phenomenon is a factor contributing to the current epidemic of obesity. In females, stress has also been linked with the disruption of the normal menstrual cycle. Prolonged exposure to stress can lead to severe sexual malfunction including the inability to ovulate, menstrual irregularities, infertility and the complete impairment of reproductive functions.    Managing Your Stress with Mindfulness  So, how can stress be managed, reduced or even eliminated, in order to reverse or halt the harmful effects of it? The power to influence your reactions to stress and build resilience can be achieved through mindfulness, self-awareness, acknowledging our own intuitive guidance system and practicing stress management techniques such as yoga and meditation, amongst others. Mind and body awareness allows individuals to influence their susceptibility to stress or trauma on the physical body.    Understanding and expanding our health locus of control by challenging negative thoughts has long been used to decrease stress and improve quality of life. Fortunately, mindful awareness can be practiced  and learned no matter where you are in your current state of stress! In this context, mindfulness refers in part to the heightened awareness of an outside stress as the first step toward relaxing, in a way that can minimize the effects of that stress on the body.    Practicing Breath Awareness  The foundation for a healthier condition of living can be achieved most readily through breath awareness. Simply taking the time to breathe with awareness has the power to shift our functioning from the SNS to the parasympathetic nervous system (PNS), the system responsible for the ‘rest and digest’ activity within the body. When in this state, the body is able to restore its normal function, which stops the production of stress hormones, lowers blood pressure, increases lung capacity and calms the nervous system.    The taxing demands of our environment make it difficult to believe that something as simple as breath focus can influence so powerfully our state of mind. However, breath awareness and meditation are some of the most solid tools available to conquer stress. Mindfulness involves stopping, paying attention and becoming aware of the present moment’s realities in a non-judgmental way.    Practicing deep breathing improves blood flow to the intuitive prefrontal cortex of the brain, thereby enhancing your mood and decision making skills instantaneously. When we are able to think more clearly, and reroute our energy in a positive direction, we experience the power of our own mental capacity and are offered a moment to choose a reaction to a given situation. This simple practice is unfortunately underutilized, even though it is available to everyone, every day. The self-awareness found in the breath offers opportunities for reflection, contemplation, and guidance towards our own moral compass. When we’re self-aware, we understand the role we play in choosing our perceptions, and the deliberate creation of our own  reality.    As we learn to shift our focus to what is under our control, rather than what is not, we enhance our internal health locus of control. This empowering shift reveals the choice we have concerning how long we decide to be confined by the emotional effects of stress, and is also determines the severity of our biological responses to the stress. Breath is the optimal place to begin when cultivating a greater sense of self, because it brings your mind into the present moment and away from fear of the future. Accompanied with exercise, positive social circles and nutritionally balanced diets, mindfulness fosters a greater quality of life.    Conclusion  The connection between stress and the onset or manifestation of physical ailments has more recently been accepted in Western culture. By expanding our health locus of control, the biological responses to stress and our influence of these responses through mindful breath awareness can significantly reduce our risks for the adverse health effects of stress. However, stress need not be present in order to increase your quality of life through mindfulness.    Mindfulness, mediation, a healthy active lifestyle and compassion for yourself encourages a joyful experience in life, and can be practiced during even the busiest times in your life. We are in control of how we perceive and respond to situations, and by practicing the shift of our focus from negative to positive while remaining in the present moment, we’ll see our lives be enriched through the power of choice with happiness beyond our greatest expectations.    Dangelo.      Weight Management: Exercise and Activity    Studies show that people who exercise are the most likely to lose weight and keep it off. Exercise burns calories. It helps build muscle to make your body stronger. Make exercise an important part of your weight-management plan.  Make activity part of your day  You may not think you have the time  to exercise. But you can work activity into your daily life--you just need to be committed. Take 10 minutes out of your lunch hour to take a walk. Walk to the [employer] to get your paper instead of having it delivered. Make it a habit to take the stairs instead of the elevator. Park in a far away parking spot instead of the closest. You’ll be surprised at how fast these little changes can make a difference.  Some people really cannot walk very far, and tire out quickly with exercise. Instead of becoming discouraged, resolve to do what you can do, and work to make that a regular frequent habit.   The benefits of exercise  Exercise offers many benefits including:   Exercise increases your metabolism (the speed at which your body burns calories).  Regular exercise can increase the amount of muscle in your body. Muscle burns calories faster than fat. The more muscle you have, the more calories you burn.  Exercise gives you energy and curbs your appetite.  Exercise decreases stress and helps you sleep better.  Make exercise fun  Exercise can be fun. Choose an activity you enjoy. You may even get a friend to do it with you:  Take a resistance-training or aerobics class  Join a team sport  Take a dance class  Walk the dog  Ride a bike  If you have health problems, be sure to ask your healthcare provider before you start an exercise program. Have a  help you develop a plan that’s safe for you.   Date Last Reviewed: 2/4/2016  © 2717-3189 [employer]. [address] [place] [address]. All rights reserved. This information is not intended as a substitute for professional medical care. Always follow your healthcare professional's instructions.      Patient Resources:    Personal Training/Fitness Classes/Health Coaching    [facility] in [place]: Full fitness center with group fitness and personal training located in [place].  Health Coaching with Alfie Khan  Kleber and Flakito Cross at our [facility]- individual coaching to work on your health goals. Call [phone] and/or email @ [email or web address]. Free 60 minute consult when client of [facility].  MonyFIT [place] @ http://www.MobileAccess Networks. A variety of group fitness options plus various yoga classes [phone] and/or email Zeinab at [email or web address]  [facility] Fitness Centers with multiple locations: Internet Broadcasting (www.Gliknik), Immune Pharmaceuticals5 Training (www.z56mappzciz.Aevi Inc.), OriginOil Body Bootcamp (www.XGraph.Aevi Inc.), MV[employer] (www.Aentropico), The Exercise  (www.Wangdaizhijiaach.com), Club Pilates (www.clubQnips GmbH.Aevi Inc.)    Online Fitness  Fitness  on [employer]  Fit in 10 DVD series   www.Leadformance  Chair exercises via Sit and Be Fit (www.sitand5to1.org) and myDrugCosts (www.HealthMicro) or Bryce Pepper or Nino Martinez videos on YouTube.  Hip Hop Fit with iMemories at www.hiphopAbril.TeePee Games    Apps for on the Go Fitness  CENX 7 Minute Workout (orange box with white 7) - free on the go HIIT training wong  Peloton Wong @ www.onepeloton.com    Nutrition Trackers, Meal Preparation, and Other Meal Programs  LoseIT! And My Fitness Pal apps and on line for tracking nutrition  NOOM - virtual health coaching  FitFoundation (healthy meals on the go) in [place] @ www.piixzjsfkjbtm1x.Aevi Inc.  Skylar KIMBLE @ www.bistromd.[employer]evi Inc. and Iotxje91 (calorie smart and low carb plans recommended) @ www.pjzdbv56.com, Metabolic Meals @ www.MyMetabolicMeals.com - individual prepared meals to go  Gobble, Blue Apron, Home , Every Plate, Sunbasket- on line meal delivery programs for preparation at home  Meal Village in [place] for homemade meals to go @ www.mealvillage.Aevi Inc.  Home meal appliances: Tovala (www.tovala.com) and Suvie (www.Clearpath Robotics.Aevi Inc.)  Diet Doctor @ www.dietdoctor.com - low carb swaps  ReciMe and Mealime wong (grocery and meal  planning)    Stress, Anxiety, Depression, Trauma  CALM meditation mable (www.calm.com)  Headspace  Don't let anxiety run your life. Using the science of emotion regulation and mindfulness to overcome fear and worry by Tyron Varghese PsyD and Mehdi Ayala MA.  The InHomeVest Podcast (September 27, 2023): 6 Magic Words That Stop Anxiety  What Happened to You?- a look at the impact trauma has on behavior written by Aisha López and Dr. Cong Macias  Whole Again by Mariano Carlisle - discovering your true self after trauma    Mindful Eating/The Hungry Brain  Am I Hungry? Mindful eating virtual  mable (www.amihungry.com)  The Hungry Brain by Lia Jacobs, PhD  Mindless Eating by Sonu Pagan  Weight Loss Surgery Will Not Treat Food Addiction by Ashley Martínez Ph.D    Metabolic Dysfunction, Hormones and Cravings  Why We Get Sick? By González Mojica (insulin resistance)  Your Body in Balance: The New Science of Food, Hormones, and Health by Dr. Miguel Sewell  The Complete Guide to fasting by Dr. Orellana  Fast Like a Girl by Dr. Magda Bolden  The M Factor (documentary on PBS about Menopause)  Sugar, Salt & Fat by Halima Tellez, Ph.D, R.D.  The Truth About Sugar - documentary on sugar (Free on Tittat, https://youIdentiGENu.be/5M6plrsDB2g)  Presentation on SUGAR called Sugar: The Bitter Truth by Dr. Bryce Marie (Tittat) https://youIdentiGENu.[email or web address]-oM?[email or web address]  Reverse Visceral Fat: #1 Way to Increase Your Lifespan & End Inflammation with Dr. Kirk Bella on Utube @ https://youIdentiGENu.be/nupPRnvUpJY?[email or web address]    Nutrition Support  You Are What You Eat - Netfix series on twin study looking at impact of nutrition changes on health  The End of Dieting: How to Live for Life by Dr. Mumtaz Carrasco M.D. or listen to The BrandCont Podcast Episode 63: Understanding \"Nutritarian\" Eating w/Dr. Mumtaz Carrasco  The Game Changers- Pryv Documentary on plant based nutrition  The Dr. Brock T5 Wellness Plan by   Rodrigo Brock MD  The Complete Guide to fasting by Dr. Orellana  @[facility] ([facility] Dietician with support surrounding nutrition and meal prep/planning)    Education, Motivation and Support Resources  Live to 100: Secrets of the Blue Zones - Netflix series on the secrets to communities living over 100 years old  Atomic Habits by Watson Mata (a book about taking small steps to promote greater behavior change)   Motivation mable (black box with white \")- daily supportive messages sent to your phone  Can't Hurt Me by Tyron Escudero (a book exploring the power of discipline in achieving your goals)  Fed Up - documentary about obesity (Free on Utube)  Www.yourweightmatters.org - Obesity Action Coalition sponsored Blog posts  Obesity Action Coalition Resources on topics specific to weight management (www.obesityaction.org)  Fitlosophy Fitspiration - journal to better health (journal book found at [employer] in fitness aisle)  Yassine Sierra talk titled: The Call to Courage (Netflix)  The Exam Room by the Physician's Committee (Podcast)  Nutrition Facts by Dr. Vaughan (Podcast)      Medication use and SEs reviewed with patient.    Return in about 3 months (around 9/27/2025) for as scheduled.    Patient verbalizes understanding.

## 2025-06-27 ENCOUNTER — OFFICE VISIT (OUTPATIENT)
Dept: INTERNAL MEDICINE CLINIC | Facility: CLINIC | Age: 45
End: 2025-06-27
Payer: COMMERCIAL

## 2025-06-27 VITALS
HEART RATE: 76 BPM | OXYGEN SATURATION: 98 % | DIASTOLIC BLOOD PRESSURE: 78 MMHG | HEIGHT: 64.5 IN | RESPIRATION RATE: 18 BRPM | WEIGHT: 175 LBS | SYSTOLIC BLOOD PRESSURE: 126 MMHG | BODY MASS INDEX: 29.51 KG/M2

## 2025-06-27 DIAGNOSIS — Z51.81 ENCOUNTER FOR THERAPEUTIC DRUG MONITORING: Primary | ICD-10-CM

## 2025-06-27 DIAGNOSIS — E66.811 CLASS 1 OBESITY WITHOUT SERIOUS COMORBIDITY WITH BODY MASS INDEX (BMI) OF 30.0 TO 30.9 IN ADULT, UNSPECIFIED OBESITY TYPE: ICD-10-CM

## 2025-06-27 DIAGNOSIS — R63.8 CRAVING FOR PARTICULAR FOOD: ICD-10-CM

## 2025-06-27 PROCEDURE — 3078F DIAST BP <80 MM HG: CPT | Performed by: NURSE PRACTITIONER

## 2025-06-27 PROCEDURE — 3074F SYST BP LT 130 MM HG: CPT | Performed by: NURSE PRACTITIONER

## 2025-06-27 PROCEDURE — 3008F BODY MASS INDEX DOCD: CPT | Performed by: NURSE PRACTITIONER

## 2025-06-27 PROCEDURE — 99213 OFFICE O/P EST LOW 20 MIN: CPT | Performed by: NURSE PRACTITIONER

## 2025-06-27 RX ORDER — METFORMIN HYDROCHLORIDE 750 MG/1
1500 TABLET, EXTENDED RELEASE ORAL
Qty: 180 TABLET | Refills: 1 | Status: SHIPPED | OUTPATIENT
Start: 2025-06-27

## 2025-06-27 NOTE — PATIENT INSTRUCTIONS
Continue making lifestyle changes that focus on good nutrition, regular exercise and stress management.    Medication Plan: Continue current medication regimen.    Tips while taking an injectable medication:    Be an intuitive eater. Listen to your hunger and fullness signals, stopping when you are full.  Consume protein and produce in your day, striving for a rainbow of color of produce.  Reduce portions to staring size of 1 cup size and check in with your gut to see if you are full. Set the timer to slow down your eating pace to allow for 15-20 minutes to complete a meal. Recommend following the \"2 bite rule\".  Reduce refined sugars and high fat foods, as they may contribute to greater side effects of nausea and heartburn.  Stop eating 3 hours before bedtime to allow your food to digest.  Remain hydrated with water or non caloric and non caffeine beverages.  Use over the counter red lozenge/supplement to help reduce nausea if needed.  If you have been off your medication for more than 2 weeks please notify our office to determine next dosing, as return to previous dose may not be appropriate or tolerated.  Zepbound can be kept at room temperature for up to 3 weeks.    Next steps to work on before next visit include: Great work in maintaining your weight during this busy season of life. Recommend follow up with Juan, our dietician. Consider meal programs like Iimepq26, BistroMD or home cook like Suvie and Tovala. Resources listed below.    Biological Consciousness: Stress Management through Mindfulness and Body Awareness    by Mine Ren, MEd, NFPT, 200-YTT  OAC Winter 2016 @ https://www.obesityaction.org/resources/biological-consciousness-stress-management-through-mindfulness-and-body-awareness/    Stress can adversely affect our health and wellbeing, but what is actually taking place in the body that makes it so worrisome for our health? What exactly is stress, and how does it influence our biological  systems? How can a feeling cause an injury or illness? Most importantly, how can chronic stress be managed in order to prevent or reverse negative health effects?    Through mindfulness and breath awareness, the functioning of our entire biological system is shifted, enhancing our mood, decision making skills and improving our chance of experiencing stress. It also improves our perception of stress, or our “health locus of control.”    Nearly all individuals experience acute or chronic mental stress and accept it as a normal part of living in the current day and age. According to the American Psychological Association and the American [place] of Stress, the amount of perceived stress individuals face has increased exponentially in the past five years. Among the top stressors are money, work and personal health. In fact, 52 percent of Americans rated their health as a cause of stress.    Not only has the overall health of Americans steadily declined --as shown by recent research that indicates only 40 percent of Americans rated their health as very good or excellent -- but the level of perceived stress has increased three-fold. We have learned we have the greatest influence on our health, happiness and perceived stress than ever thought before.    Chronic stress has the ability to shift our biological functions from a state of wellness and balance, to a state of potential injury, illness and even chronic disease. Stress is commonly thought of as a negative occurrence leading to a range of uncomfortable emotions and symptoms. However, stress can also be the foundation for productivity and innovation. The main distinction lies within our perception and choice responses to the stress.    What is Stress then, If it Can be Both Helpful and Harmful?  Stress can be defined as any situation that disturbs the equilibrium of a living organism (including plants and animals) and forces them to adapt or change. Mental stress  can be any situation in which environmental demands - internal demands or both - tax or exceed the adaptive resources of an individual, social system or physical tissue system.    Mental Stress  Mental stress is an internal experience; an experience that interferes with the entirety of our physiological makeup. Not only does stress challenge our mental capacity to adapt, as well as our current state of happiness, it also directly influences our homeostasis (balance). An individual’s belief in the dominance they have over stress also plays a major factor in mental, physical and emotional responses to stress.    These beliefs are what form an individual’s “health locus of control” (the perceived control over health outcomes). Those with a high external health locus of control perceive stress and their response to stress as being out of their control, whereas those with a high internal health locus of control believe that stress responses are influenced at their will. Studies indicate that individuals who have a high external locus of control experience worse health outcomes than those who feel that their health is within their control.    In order for the physical effects of mental stress to be understood, we must start within our internal make up, originating with the tiniest of cells. The human body is made up of trillions of cells, each conducting a magnetic field of energy. The energetic pulse of excitable tissue (i.e. nerves, muscles, etc.) is essentially what brings life to our bodies. Internally, this force field stimulates the function of our metabolism, digestive system, heart rate, hormone production, immunity and even our nervous system’s responses.    The consequences of chronic stress on our energy systems can lead to unfavorable health effects. Symptoms of stress such as elevated heart rate, increased blood pressure, slowed metabolism, tension headaches and anxiety can lead to more severe chronic  illnesses. Maintaining a constant level of stress for an extended period of time can trigger adverse health outcomes such as heart disease, weight gain, chronic migraines and even cancer, to name a few.    Physical Stress  So, how does a feeling resonate physically in the body, and what is taking place to decrease our health? The beginning of our internal reactions to stress originates in the brain. The hypothalamus, located in the lower midbrain, is responsible for our primal and instinctual responses. When under stress, the hypothalamus first alarms the body of potential harm by sending nerve signals to the adrenal glands, located above the kidneys. These glands then release a surge of hormones including adrenaline (epinephrine), and cortisol. This communication prompts a series of physical responses including elevated heart rate, increased skeletal muscle blood flow and behavior activation.    Our primal instincts driven by the lower-mid brain take over functioning and instinctual decision making through the influence of the sympathetic nervous system (SNS) (which is responsible for the ‘fight or flight’ response). When operating through the SNS, numerous bodily systems are shut down to focus only on necessary functions. For instance, thyroid functioning is working at a slower pace, insulin production is decreased and blood flow to the digestive system is less, resulting in slower metabolism and reduced nutritional intake. When cortisol is being released during this state, fat storage is increased in order to ensure survival.    This phenomenon is a factor contributing to the current epidemic of obesity. In females, stress has also been linked with the disruption of the normal menstrual cycle. Prolonged exposure to stress can lead to severe sexual malfunction including the inability to ovulate, menstrual irregularities, infertility and the complete impairment of reproductive functions.    Managing Your Stress  with Mindfulness  So, how can stress be managed, reduced or even eliminated, in order to reverse or halt the harmful effects of it? The power to influence your reactions to stress and build resilience can be achieved through mindfulness, self-awareness, acknowledging our own intuitive guidance system and practicing stress management techniques such as yoga and meditation, amongst others. Mind and body awareness allows individuals to influence their susceptibility to stress or trauma on the physical body.    Understanding and expanding our health locus of control by challenging negative thoughts has long been used to decrease stress and improve quality of life. Fortunately, mindful awareness can be practiced and learned no matter where you are in your current state of stress! In this context, mindfulness refers in part to the heightened awareness of an outside stress as the first step toward relaxing, in a way that can minimize the effects of that stress on the body.    Practicing Breath Awareness  The foundation for a healthier condition of living can be achieved most readily through breath awareness. Simply taking the time to breathe with awareness has the power to shift our functioning from the SNS to the parasympathetic nervous system (PNS), the system responsible for the ‘rest and digest’ activity within the body. When in this state, the body is able to restore its normal function, which stops the production of stress hormones, lowers blood pressure, increases lung capacity and calms the nervous system.    The taxing demands of our environment make it difficult to believe that something as simple as breath focus can influence so powerfully our state of mind. However, breath awareness and meditation are some of the most solid tools available to conquer stress. Mindfulness involves stopping, paying attention and becoming aware of the present moment’s realities in a non-judgmental way.    Practicing deep breathing  improves blood flow to the intuitive prefrontal cortex of the brain, thereby enhancing your mood and decision making skills instantaneously. When we are able to think more clearly, and reroute our energy in a positive direction, we experience the power of our own mental capacity and are offered a moment to choose a reaction to a given situation. This simple practice is unfortunately underutilized, even though it is available to everyone, every day. The self-awareness found in the breath offers opportunities for reflection, contemplation, and guidance towards our own moral compass. When we’re self-aware, we understand the role we play in choosing our perceptions, and the deliberate creation of our own reality.    As we learn to shift our focus to what is under our control, rather than what is not, we enhance our internal health locus of control. This empowering shift reveals the choice we have concerning how long we decide to be confined by the emotional effects of stress, and is also determines the severity of our biological responses to the stress. Breath is the optimal place to begin when cultivating a greater sense of self, because it brings your mind into the present moment and away from fear of the future. Accompanied with exercise, positive social circles and nutritionally balanced diets, mindfulness fosters a greater quality of life.    Conclusion  The connection between stress and the onset or manifestation of physical ailments has more recently been accepted in Western culture. By expanding our health locus of control, the biological responses to stress and our influence of these responses through mindful breath awareness can significantly reduce our risks for the adverse health effects of stress. However, stress need not be present in order to increase your quality of life through mindfulness.    Mindfulness, mediation, a healthy active lifestyle and compassion for yourself encourages a joyful experience  in life, and can be practiced during even the busiest times in your life. We are in control of how we perceive and respond to situations, and by practicing the shift of our focus from negative to positive while remaining in the present moment, we’ll see our lives be enriched through the power of choice with happiness beyond our greatest expectations.    Dangelo.      Weight Management: Exercise and Activity    Studies show that people who exercise are the most likely to lose weight and keep it off. Exercise burns calories. It helps build muscle to make your body stronger. Make exercise an important part of your weight-management plan.  Make activity part of your day  You may not think you have the time to exercise. But you can work activity into your daily life--you just need to be committed. Take 10 minutes out of your lunch hour to take a walk. Walk to the [employer] to get your paper instead of having it delivered. Make it a habit to take the stairs instead of the elevator. Park in a far away parking spot instead of the closest. You’ll be surprised at how fast these little changes can make a difference.  Some people really cannot walk very far, and tire out quickly with exercise. Instead of becoming discouraged, resolve to do what you can do, and work to make that a regular frequent habit.   The benefits of exercise  Exercise offers many benefits including:   Exercise increases your metabolism (the speed at which your body burns calories).  Regular exercise can increase the amount of muscle in your body. Muscle burns calories faster than fat. The more muscle you have, the more calories you burn.  Exercise gives you energy and curbs your appetite.  Exercise decreases stress and helps you sleep better.  Make exercise fun  Exercise can be fun. Choose an activity you enjoy. You may even get a friend to do it with you:  Take a resistance-training or aerobics class  Join a team sport  Take a dance class  Walk the  dog  Ride a bike  If you have health problems, be sure to ask your healthcare provider before you start an exercise program. Have a  help you develop a plan that’s safe for you.   Date Last Reviewed: 2/4/2016 © 2000-2017 [employer]. [address] [place] [address]. All rights reserved. This information is not intended as a substitute for professional medical care. Always follow your healthcare professional's instructions.      Patient Resources:    Personal Training/Fitness Classes/Health Coaching    [facility] in [place]: Full fitness center with group fitness and personal training located in [place].  Health Coaching with Cherrie Michelle, Alfie Shahid, and Flakito Cross at our [facility]- individual coaching to work on your health goals. Call [phone] and/or email @ [email or web address]. Free 60 minute consult when client of [employer] Weight [facility].  [address] [place] [address] @ [email or web address]. A variety of group fitness options plus various yoga classes [phone] and/or email Zeinab at [email or web address]  [facility] Fitness Centers with multiple locations: roundCorner (www.Remind Technologies.MartMobi Technologies), F45 Training (www.s81mvvnjtbr.MartMobi Technologies), BovControl Body Bootcamp (www.PICS Auditingp.MartMobi Technologies), Mobee (www.Kasenna.MartMobi Technologies), The Exercise  (www.exercisecoach.com), Club Pilates (www.clubpilates.com)    Online Fitness  Fitness  on Grovo  Fit in 10 DVD series   www.ykcvt88IIQ.MartMobi Technologies  Chair exercises via Sit and Be Fit (www.sitandbefit.org) and [employer] (www.HelloFresh.com) or Bryce Pepper or Nino Martinez videos on YouTube.  Hip Hop Fit with Cirilo Fraser at www.hiphopfit.[employer]    Apps for on the Go Fitness  [place] 7 Minute Workout (orange box with white 7) - free on the go HIIT training wong  Bertan Wong @ www.onepeloton.com    Nutrition Trackers, Meal Preparation, and Other Meal  Programs  LoseIT! And My Fitness Pal apps and on line for tracking nutrition  NOOM - virtual health coaching  FitFoundation (healthy meals on the go) in [place] @ www.vgsnxiflxshsa3b.Shopo  Skylar KIMBLE @ www.bistromdAntares Vision and Dygurg65 (calorie smart and low carb plans recommended) @ www.dmhyym34.com, Metabolic Meals @ www.NtractiveMetabolicMeals.com - individual prepared meals to go  Gobble, Blue Apron, Home , Every Plate, Sunbasket- on line meal delivery programs for preparation at home  Meal Village in [place] for homemade meals to go @ www.mealvillage.Shopo  Home meal appliances: Tovala (www.tovala.com) and Suvie (www.suvieAntares Vision)  Diet Doctor @ www.dietdoctorAntares Vision - low carb swaps  ReciMe and Mealime mable (grocery and meal planning)    Stress, Anxiety, Depression, Trauma  CALM meditation mable (www.calm.com)  Headspace  Don't let anxiety run your life. Using the science of emotion regulation and mindfulness to overcome fear and worry by Tyron Varghese PsyD and Mehdi Ayala MA.  The Silent Circle Podcast (September 27, 2023): 6 Magic Words That Stop Anxiety  What Happened to You?- a look at the impact trauma has on behavior written by Aisha López and Dr. Cong Macias  Whole Again by Mariano Carlisle - discovering your true self after trauma    Mindful Eating/The Hungry Brain  Am I Hungry? Mindful eating virtual  mbale (www.amihungry.com)  The Hungry Brain by Lia Jacobs, PhD  Mindless Eating by Sonu Pagan  Weight Loss Surgery Will Not Treat Food Addiction by Ashley Martínez Ph.D    Metabolic Dysfunction, Hormones and Cravings  Why We Get Sick? By González Mojica (insulin resistance)  Your Body in Balance: The New Science of Food, Hormones, and Health by Dr. Miguel Sewell  The Complete Guide to fasting by Dr. Orellana  Fast Like a Girl by Dr. Magda Bolden  The M Factor (documentary on PBS about Menopause)  Sugar, Salt & Fat by Halima Tellez, Ph.D, R.D.  The Truth About Sugar - documentary on sugar (Free on Utube,  https://youtu.be/9W8gppiNF8r)  Presentation on SUGAR called Sugar: The Bitter Truth by Dr. Bryce Marie (Utube) https://youtu.be/dBnniua6-oM?si=[email or web address]  Reverse Visceral Fat: #1 Way to Increase Your Lifespan & End Inflammation with Dr. Kirk Bella on Utube @ https://youtu.be/nupPRnvUpJY?si=[email or web address]    Nutrition Support  You Are What You Eat - Netfix series on twin study looking at impact of nutrition changes on health  The End of Dieting: How to Live for Life by Dr. Mumtaz Carrasco M.D. or listen to The Sport Universal Process Podcast Episode 63: Understanding \"Nutritarian\" Eating w/Dr. Mumtaz Carrasco  The Game Changers- Netflix Documentary on plant based nutrition  The Dr. Brock  Wellness Plan by Dr. Rodrigo Brock MD  The Complete Guide to fasting by Dr. Orellana  @[facility] ([facility] Dietician with support surrounding nutrition and meal prep/planning)    Education, Motivation and Support Resources  Live to 100: Secrets of the Blue Zones - Netflix series on the secrets to communities living over 100 years old  Atomic Habits by Watson Mata (a book about taking small steps to promote greater behavior change)   Motivation mable (black box with white \")- daily supportive messages sent to your phone  Can't Hurt Me by Tyron Escudero (a book exploring the power of discipline in achieving your goals)  Fed Up - documentary about obesity (Free on Utube)  Www.yourweightmatters.org - Obesity Action Coalition sponsored Blog posts  Obesity Action Coalition Resources on topics specific to weight management (www.obesityaction.org)  Fitlosophy Fitspiration - journal to better health (journal book found at [employer] in fitness aisle)  Yassine Sierra talk titled: The Call to Courage (Netflix)  The Exam Room by the Physician's Committee (Podcast)  Nutrition Facts by Dr. Vaughan (Podcast)

## 2025-07-29 DIAGNOSIS — E55.9 VITAMIN D DEFICIENCY: ICD-10-CM

## 2025-07-29 DIAGNOSIS — M26.609 TMJ (TEMPOROMANDIBULAR JOINT DISORDER): ICD-10-CM

## 2025-08-01 RX ORDER — NAPROXEN 500 MG/1
500 TABLET ORAL 2 TIMES DAILY WITH MEALS
Qty: 180 TABLET | Refills: 3 | OUTPATIENT
Start: 2025-08-01

## 2025-08-01 RX ORDER — ACETAMINOPHEN 160 MG
4000 TABLET,DISINTEGRATING ORAL DAILY
Qty: 180 CAPSULE | Refills: 3 | OUTPATIENT
Start: 2025-08-01

## 2025-08-20 ENCOUNTER — OFFICE VISIT (OUTPATIENT)
Dept: INTERNAL MEDICINE CLINIC | Facility: CLINIC | Age: 45
End: 2025-08-20

## 2025-08-20 VITALS — WEIGHT: 182 LBS | BODY MASS INDEX: 31 KG/M2

## 2025-08-20 DIAGNOSIS — E66.9 OBESITY (BMI 30-39.9): Primary | ICD-10-CM

## 2025-08-20 DIAGNOSIS — Z71.3 ENCOUNTER FOR WEIGHT LOSS COUNSELING: ICD-10-CM

## 2025-08-20 PROCEDURE — 97802 MEDICAL NUTRITION INDIV IN: CPT | Performed by: DIETITIAN, REGISTERED

## (undated) DIAGNOSIS — Z51.81 ENCOUNTER FOR THERAPEUTIC DRUG MONITORING: ICD-10-CM

## (undated) DIAGNOSIS — E66.9 OBESITY (BMI 30-39.9): ICD-10-CM

## (undated) DIAGNOSIS — F43.9 STRESS: ICD-10-CM

## (undated) DIAGNOSIS — M26.609 TMJ (TEMPOROMANDIBULAR JOINT DISORDER): ICD-10-CM

## (undated) NOTE — Clinical Note
Date: 5/23/2017    Patient Name: Cony Fitzpatrick          To Whom it may concern: This letter has been written at the patient's request. The above patient was seen at the [facility] for treatment of a medical condition.     This patient w

## (undated) NOTE — MR AVS SNAPSHOT
After Visit Summary   10/20/2020    Юлия Flynn    MRN: [id number]           Visit Information     Date & Time  10/20/2020  8:30 AM Provider  Manny Mello DO [facility], [facility] Dept.  Phone  [phone] Encounter for screening mammogram for breast cancer   [[id number]]    Screening for cervical cancer   [[id number]]             Follow-up    Return in about 1 year (around 10/20/2021) for physical.     We Ordered the Following     Normal Orders This Visit    Lisa Noyola If you do not have a [employer] Account, or if you prefer to speak with someone to schedule your appointment, please call [facility] Scheduling at [phone].                   [facility] [place] offers Video Visits through [employer] for adult and pediatric [address]   Monday – Friday  10:00 am – 10:00 pm   Saturday – Sunday  10:00 am – 4:00 pm     [address]   Monday – Friday  4:00 pm – 10:00 pm   Saturday – Sunday  10:00 am –

## (undated) NOTE — ED AVS SNAPSHOT
Kassandra Reggie   MRN: [id number]    Department:  [facility] Emergency Department   Date of Visit:  8/10/2018           Disclosure     Insurance plans vary and the physician(s) referred by the ER may not be covered by your plan.  Please contact y tell this physician (or your personal doctor if your instructions are to return to your personal doctor) about any new or lasting problems. The primary care or specialist physician will see patients referred from the [facility] Emergency Department.  Julianne Walden

## (undated) NOTE — LETTER
Date: 10/17/2022    Patient Name: Cristopher Caceres          To Whom it may concern:    Helder Potter was evaluated by me on 10/17/22. Based on patient's symptoms, she should be excused from all work duties starting 10/17/22 until 10/23/22. She may return to work starting 10/24/22 pending improvement in her symptoms.          Sincerely,        Calos Garcia MD

## (undated) NOTE — MR AVS SNAPSHOT
[facility], [address] [id number]               Thank you for choosing us for your health care visit with Kassandra Sotomayor DO.   We are glad to serve you and happy to provide you with this summary Today's Orders     [facility] NOAH 2D+3D SCREENING BILAT (CPT=[id number])    Complete by:   Feb 01, 2017 (Approximate)    Assoc Dx:  Encounter for screening mammogram for malignant neoplasm of breast [Z12.31]           Urine Dip, auto without Micro    Complete by Visit [facility] online at  [facility].tn

## (undated) NOTE — LETTER
[facility]ATE [facility]ARE [facility]  [address]  [place] [address]  [phone]     Patient: Will Castaneda   YOB: 1980   Date of Visit: 1/28/2021     Dear Employer,        January 28, 2021    At [facility], we are taking s Persons infected with SARS-CoV-2 who never develop COVID-19 symptoms may discontinue isolation and other precautions 10 days after the date of their first positive RT-PCR test for SARS-CoV-2 RNA.     Persons who are asymptomatic but have been exposed, CDC r

## (undated) NOTE — MR AVS SNAPSHOT
[facility] [address], [address] [id number]               Thank you for choosing us for your health care visit with Ro Collier DO.   We are glad to serve you and happy to provide you with this summary Take 1 tablet (800 mg total) by mouth 3 (three) times daily. What changed:    - medication strength  - how much to take  - when to take this  - reasons to take this  - Another medication with the same name was removed.  Continue taking this medication, an Vitamin D, 25-Hydroxy [E]    Complete by:  Jan 31, 2017 (Approximate)    Assoc Dx:  Laboratory examination ordered as part of a routine general medical examination [Z00.00], Vitamin D deficiency [E55.9]           NGUYỄN DIGITAL SCRN BILAT W/CAD (CPT=/77 [email or web address]. If you've recently had a stay at the Hospital you can access your discharge instructions in [employer] by going to Visits < Admission Summaries.  If you've been to the Emergency Department or your doctor's office, you can view yo Visit [facility] online at  [email or web address]

## (undated) NOTE — LETTER
Date: 2/19/2024    Patient Name: Gris Fan          To Whom it may concern:    he above patient was seen at the [facility] for treatment of a medical condition.    This patient should be excused from attending work until symptom free for 24 hours with no medication use per patient report.    The patient is expected to return to work on or around 2/21/24 with no limitations.        Sincerely,        STEPHANIE Sevilla

## (undated) NOTE — LETTER
Date & Time: 8/26/2018, 12:32 PM  Patient: Jose A Noel  Encounter Provider(s):    ANNI Kimbrough       To Whom It May Concern:    Krunal Hanson was seen and treated in our department on 8/26/2018.   Patient will return to work on Wednesday i

## (undated) NOTE — Clinical Note
Date: 5/2/2017    Patient Name: Gavin Ramos          To Whom it may concern: This letter has been written at the patient's request. The above patient was seen at the [facility] for treatment of a medical condition.     This patient sh

## (undated) NOTE — MR AVS SNAPSHOT
[facility] [address] [place] [address]  [address] [place] [id number] [phone]               Thank you for choosing us for your health care visit with Annemarie Zamorano PA-C.   We are glad to serve you and happy to provide you with this summary of your visi the lungs). Normally, air moves easily in and out of the airways. Bronchitis narrows the airways, making it harder for air to flow in and out of the lungs. This causes symptoms such as shortness of breath, coughing, and wheezing.  Bronchitis can be “acute” · Do not smoke. Do not allow anyone else to smoke in your home. Recovery and follow-up  Follow up with your doctor as you are told. You will likely feel better in a week or two. But a dry cough can linger beyond that time.  Let your doctor know if you stil Take 1 tablet by mouth daily.            Phentermine HCl 37.5 MG Tabs   Take 1 tablet (37.5 mg total) by mouth every morning before breakfast.   Commonly known as:  ADIPEX-P           TraMADol HCl 50 MG Tabs   Take 1 tablet (50 mg total) by mouth 2 (two) ti

## (undated) NOTE — MR AVS SNAPSHOT
[facility] [address], [address] [address] [phone]               Thank you for choosing us for your health care visit with Sydnie Farley DO.   We are glad to serve you and happy to provide you with this summary You can get these medications from any pharmacy     Bring a paper prescription for each of these medications    - Phentermine HCl 37.5 MG Tabs            MyChart     Visit MyChart  You can access your MyChart to more actively manage your health care and vi

## (undated) NOTE — LETTER
[address], [address], [place], [place], [address]    Phone: [phone]  Fax: [phone]    JENNIFER: [id number]   www. [email or web address]     New Prescription  Patient Name: Con Norman YOB: 1980   Patient Britni Tirado box)    Directions: Take 3 capsules (1 pod) with 16 oz of water 20-30 minutes before lunch and before dinner.     Refills:(choose one) 3  [x]  6  []   9  []  12   []  Other   []    Comments: n/a ROSALIA:     [x]  Yes   []  No  ICD-10: E66.9                [facility]